# Patient Record
Sex: FEMALE | Race: BLACK OR AFRICAN AMERICAN | NOT HISPANIC OR LATINO | Employment: FULL TIME | URBAN - METROPOLITAN AREA
[De-identification: names, ages, dates, MRNs, and addresses within clinical notes are randomized per-mention and may not be internally consistent; named-entity substitution may affect disease eponyms.]

---

## 2017-02-24 ENCOUNTER — TELEPHONE (OUTPATIENT)
Dept: FAMILY MEDICINE | Facility: CLINIC | Age: 57
End: 2017-02-24

## 2017-02-24 NOTE — TELEPHONE ENCOUNTER
Patient is due for a mammogram. Left message for patient to call back  Diamante Parekh Scheduling at [phone]. Encounter sent to reception team to send letter to home address.     If patient returns call, please help them schedule a mammogram.     Thank you,    [facility]

## 2017-02-24 NOTE — LETTER
March 7, 2017      Nancy Bejarano  [address] [place] [address]    Dear Nancy Bejarano,     At [facility]  we care about your health and are committed to providing quality patient care, which includes staying current on preventative cancer screenings.  You can increase your chances of finding and treating cancers through regular screenings.      Our records show that you are due for the following screening:      Mammogram for breast cancer   Recommended every 1-2 years for women age 50 and older  Mammograms help detect breast cancer, which is the most common cancer among women in the [place].  You may need to start having mammograms earlier and more often if you have had breast cancer, breast problems, or a family history of breast cancer.       You may contact us by phone at [facility] at [phone] to schedule your mammogram at your earliest convenience.    If you have already had a mammogram at another facility, please call us so that we may update your chart.      Your partners in health,      Quality Committee   [facility]

## 2017-05-16 ENCOUNTER — TELEPHONE (OUTPATIENT)
Dept: FAMILY MEDICINE | Facility: CLINIC | Age: 57
End: 2017-05-16

## 2017-05-16 NOTE — TELEPHONE ENCOUNTER
Call Regarding Preventive Health Screening Mammogram    Attempt 1    Message on voicemail     Comments:       Outreach   Nancy Irizarry

## 2017-06-05 ENCOUNTER — OFFICE VISIT (OUTPATIENT)
Dept: FAMILY MEDICINE | Facility: CLINIC | Age: 57
End: 2017-06-05
Payer: COMMERCIAL

## 2017-06-05 VITALS
HEIGHT: 63 IN | TEMPERATURE: 98.6 F | WEIGHT: 162 LBS | HEART RATE: 92 BPM | DIASTOLIC BLOOD PRESSURE: 63 MMHG | SYSTOLIC BLOOD PRESSURE: 106 MMHG | BODY MASS INDEX: 28.7 KG/M2 | OXYGEN SATURATION: 98 %

## 2017-06-05 DIAGNOSIS — I10 HYPERTENSION GOAL BP (BLOOD PRESSURE) < 140/90: Primary | ICD-10-CM

## 2017-06-05 DIAGNOSIS — Z13.5 SCREENING FOR DIABETIC RETINOPATHY: ICD-10-CM

## 2017-06-05 DIAGNOSIS — R25.2 CRAMP OF LIMB: ICD-10-CM

## 2017-06-05 DIAGNOSIS — E78.5 HYPERLIPIDEMIA LDL GOAL <130: ICD-10-CM

## 2017-06-05 DIAGNOSIS — M79.672 PAIN IN BOTH FEET: ICD-10-CM

## 2017-06-05 DIAGNOSIS — Z12.11 SCREEN FOR COLON CANCER: ICD-10-CM

## 2017-06-05 DIAGNOSIS — Z12.31 VISIT FOR SCREENING MAMMOGRAM: ICD-10-CM

## 2017-06-05 DIAGNOSIS — M79.671 PAIN IN BOTH FEET: ICD-10-CM

## 2017-06-05 LAB
ALBUMIN SERPL-MCNC: 4.2 G/DL (ref 3.4–5)
ANION GAP SERPL CALCULATED.3IONS-SCNC: 7 MMOL/L (ref 3–14)
BUN SERPL-MCNC: 13 MG/DL (ref 7–30)
CALCIUM SERPL-MCNC: 9.4 MG/DL (ref 8.5–10.1)
CHLORIDE SERPL-SCNC: 102 MMOL/L (ref 94–109)
CHOLEST SERPL-MCNC: 215 MG/DL
CO2 SERPL-SCNC: 29 MMOL/L (ref 20–32)
CREAT SERPL-MCNC: 0.85 MG/DL (ref 0.52–1.04)
CREAT UR-MCNC: 61 MG/DL
GFR SERPL CREATININE-BSD FRML MDRD: 69 ML/MIN/1.7M2
GLUCOSE SERPL-MCNC: 95 MG/DL (ref 70–99)
HDLC SERPL-MCNC: 56 MG/DL
LDLC SERPL CALC-MCNC: 138 MG/DL
MICROALBUMIN UR-MCNC: <5 MG/L
MICROALBUMIN/CREAT UR: NORMAL MG/G CR (ref 0–25)
NONHDLC SERPL-MCNC: 159 MG/DL
PHOSPHATE SERPL-MCNC: 3.7 MG/DL (ref 2.5–4.5)
POTASSIUM SERPL-SCNC: 4.1 MMOL/L (ref 3.4–5.3)
SODIUM SERPL-SCNC: 138 MMOL/L (ref 133–144)
TRIGL SERPL-MCNC: 103 MG/DL

## 2017-06-05 PROCEDURE — 80061 LIPID PANEL: CPT | Performed by: FAMILY MEDICINE

## 2017-06-05 PROCEDURE — 82043 UR ALBUMIN QUANTITATIVE: CPT | Performed by: FAMILY MEDICINE

## 2017-06-05 PROCEDURE — 80069 RENAL FUNCTION PANEL: CPT | Performed by: FAMILY MEDICINE

## 2017-06-05 PROCEDURE — 36415 COLL VENOUS BLD VENIPUNCTURE: CPT | Performed by: FAMILY MEDICINE

## 2017-06-05 PROCEDURE — 99214 OFFICE O/P EST MOD 30 MIN: CPT | Performed by: FAMILY MEDICINE

## 2017-06-05 RX ORDER — CYCLOBENZAPRINE HCL 10 MG
5-10 TABLET ORAL 2 TIMES DAILY PRN
Qty: 30 TABLET | Refills: 1 | Status: SHIPPED | OUTPATIENT
Start: 2017-06-05 | End: 2017-07-05

## 2017-06-05 ASSESSMENT — PAIN SCALES - GENERAL: PAINLEVEL: WORST PAIN (10)

## 2017-06-05 NOTE — NURSING NOTE
"Chief Complaint   Patient presents with     Pain       Initial /63 (BP Location: Right arm, Patient Position: Chair, Cuff Size: Adult Large)  Pulse 92  Temp 98.6  F (37  C) (Oral)  Ht 5' 3.03\" (1.601 m)  Wt 162 lb (73.5 kg)  SpO2 98%  Breastfeeding? No  BMI 28.67 kg/m2 Estimated body mass index is 28.67 kg/(m^2) as calculated from the following:    Height as of this encounter: 5' 3.03\" (1.601 m).    Weight as of this encounter: 162 lb (73.5 kg).  Medication Reconciliation: complete     Gil Layton CMA     "

## 2017-06-05 NOTE — PROGRESS NOTES
SUBJECTIVE:                                                    Nancy Bejarano is a 57 year old female who presents to clinic today for the following health issues:    Joint Pain- muscle cramps in calves and feet that cause tension.      Onset: few months    Description:   Location: Left shoulder, fingers both hands, bottom on toes both feet  Character: Dull ache    Intensity: moderate, 10/10    Progression of Symptoms: same, intermittent    Accompanying Signs & Symptoms:  Other symptoms: numbness fingers and toes   History:   Previous similar pain: no       Precipitating factors:   Trauma or overuse: no, but stretching or it sometimes it just happens in a painful way all over or in the back, it's hard to describe. It can come at anytime. It feels like some kind of heavy thing in my toes or my back.     Alleviating factors:  Improved by: Cold water bottle rub, cold/wam towel, massaging       Therapies Tried and outcome: Cold water bottle rub, cold/wem towel, massaging      Hyperlipidemia Follow-Up      Rate your low fat/cholesterol diet?: good    Taking statin?  No    Other lipid medications/supplements?:  none     Hypertension Follow-up      Outpatient blood pressures are not being checked.    Low Salt Diet: no added salt       Problem list and histories reviewed & adjusted, as indicated.  Additional history: as documented    Patient Active Problem List   Diagnosis     Hypertension goal BP (blood pressure) < 140/90     Hyperlipidemia LDL goal <130     Back pain     GERD (gastroesophageal reflux disease)     Abdominal pain, unspecified abdominal location     Cataracts, both eyes     Posterior vitreous detachment of both eyes     Cervicalgia     Chronic pain of left knee     Left knee pain, unspecified chronicity     History reviewed. No pertinent surgical history.    Social History   Substance Use Topics     Smoking status: Never Smoker     Smokeless tobacco: Never Used      Comment: lives in smoke free household.      Alcohol use No     Family History   Problem Relation Age of Onset     DIABETES No family hx of      Hypertension No family hx of      Breast Cancer No family hx of      Cancer - colorectal No family hx of      CANCER No family hx of      CEREBROVASCULAR DISEASE No family hx of      Thyroid Disease No family hx of      Glaucoma No family hx of      Macular Degeneration No family hx of          Current Outpatient Prescriptions   Medication Sig Dispense Refill     atenolol (TENORMIN) 50 MG tablet Take 1 tablet (50 mg) by mouth daily 90 tablet 3     order for DME Equipment being ordered: [id number] $144, . Knee, Hinged, Lg blk neoprene 1 Device 0     Allergies   Allergen Reactions     Asa [Dihydroxyaluminum Aminoacetate] Other (See Comments)     Epigastric   pain     Recent Labs   Lab Test  06/13/16   1204  08/12/15   1054  05/19/14   1302  12/04/13   1239  09/23/13   1401   12/27/11   0825  08/12/11   1117   LDL   --    --    --    --    --    --   96  143*   HDL   --    --    --    --    --    --   46*  44*   TRIG   --    --    --    --    --    --   39  63   ALT  28   --   20  33   --    < >  31   --    CR  0.85  0.94  0.98  0.89   --    < >  0.96   --    GFRESTIMATED  69  62  59*  66   --    < >  61   --    GFRESTBLACK  83  75  72  80   --    < >  74   --    POTASSIUM  4.1  4.1  3.9  4.1   --    < >  4.0   --    TSH  2.58   --    --    --   4.13   < >   --    --     < > = values in this interval not displayed.      BP Readings from Last 3 Encounters:   06/05/17 106/63   12/30/16 151/82   12/30/16 151/82    Wt Readings from Last 3 Encounters:   06/05/17 162 lb (73.5 kg)   12/30/16 165 lb 12.8 oz (75.2 kg)   12/30/16 165 lb 12.8 oz (75.2 kg)                  Labs reviewed in EPIC    Reviewed and updated as needed this visit by clinical staff       Reviewed and updated as needed this visit by Provider         ROS:  Constitutional, HEENT, cardiovascular, pulmonary, gi and gu systems are negative, except as  "otherwise noted.    OBJECTIVE:                                                    /63 (BP Location: Right arm, Patient Position: Chair, Cuff Size: Adult Large)  Pulse 92  Temp 98.6  F (37  C) (Oral)  Ht 5' 3.03\" (1.601 m)  Wt 162 lb (73.5 kg)  SpO2 98%  Breastfeeding? No  BMI 28.67 kg/m2  Body mass index is 28.67 kg/(m^2).  GENERAL APPEARANCE: healthy, alert and no distress  EYES: Eyes grossly normal to inspection, PERRL and conjunctivae and sclerae normal  HENT: ear canals and TM's normal, nose and mouth without ulcers or lesions, oropharynx clear and oral mucous membranes moist  NECK: no adenopathy, no asymmetry, masses, or scars and thyroid normal to palpation  RESP: lungs clear to auscultation - no rales, rhonchi or wheezes  CV: regular rates and rhythm, normal S1 S2, no S3 or S4, no murmur, click or rub, no peripheral edema and peripheral pulses strong  ABDOMEN: soft, nontender, no hepatosplenomegaly, no masses and bowel sounds normal  MS: no musculoskeletal defects are noted and gait is age appropriate without ataxia, 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 Ulnar deviation.   SKIN: no suspicious lesions or rashes  NEURO: Normal strength and tone, sensory exam grossly normal, mentation intact and speech normal  PSYCH: mentation appears normal and affect normal/bright   Diabetic foot exam: normal DP and PT pulses, no trophic changes or ulcerative lesions, normal calluses, no deformities, normal sensory exam and normal monofilament exam, both little toes invert and are stepped on by fourth toes. Feet are somewhat flat. Nails dystrophic.     Diagnostic Test Results:  Results for orders placed or performed in visit on 06/05/17 (from the past 24 hour(s))   Lipid panel reflex to direct LDL   Result Value Ref Range    Cholesterol 215 (H) <200 mg/dL    Triglycerides 103 <150 mg/dL    HDL Cholesterol 56 >49 mg/dL    LDL Cholesterol Calculated 138 (H) <100 mg/dL " "   Non HDL Cholesterol 159 (H) <130 mg/dL   Renal panel   Result Value Ref Range    Sodium 138 133 - 144 mmol/L    Potassium 4.1 3.4 - 5.3 mmol/L    Chloride 102 94 - 109 mmol/L    Carbon Dioxide 29 20 - 32 mmol/L    Anion Gap 7 3 - 14 mmol/L    Glucose 95 70 - 99 mg/dL    Urea Nitrogen 13 7 - 30 mg/dL    Creatinine 0.85 0.52 - 1.04 mg/dL    GFR Estimate 69 >60 mL/min/1.7m2    GFR Estimate If Black 83 >60 mL/min/1.7m2    Calcium 9.4 8.5 - 10.1 mg/dL    Phosphorus 3.7 2.5 - 4.5 mg/dL    Albumin 4.2 3.4 - 5.0 g/dL        ASSESSMENT/PLAN:                                                        Tobacco Cessation:   reports that she has never smoked. She has never used smokeless tobacco.      BMI:   Estimated body mass index is 28.67 kg/(m^2) as calculated from the following:    Height as of this encounter: 5' 3.03\" (1.601 m).    Weight as of this encounter: 162 lb (73.5 kg).   Weight management plan: Discussed healthy diet and exercise guidelines and patient will follow up in 3 months in clinic to re-evaluate.        ICD-10-CM    1. Hypertension goal BP (blood pressure) < 140/90- low blood pressure in clinic and at home. Does not need medication changes at this time.  I10 Renal panel     Albumin Random Urine Quantitative   2. Hyperlipidemia LDL goal <130 E78.5 Lipid panel reflex to direct LDL   3. Screen for colon cancer Z12.11 Fecal colorectal cancer screen FIT - Future (S+30)   4. Visit for screening mammogram Z12.31 MA SCREENING DIGITAL BILAT - Future  (s+30)   5. Screening for diabetic retinopathy Z13.5    6. Pain in both feet M79.671 PODIATRY/FOOT & ANKLE SURGERY REFERRAL    M79.672    7. Cramp of limb- does not sound like restless legs R25.2 cyclobenzaprine (FLEXERIL) 10 MG tablet       CONSULTATION/REFERRAL to podiatry for foot evaluation. She is not a good historian and tends to jump from one body part and symptom to another but some of her symptoms focus on her foot issues.  FUTURE LABS:       - Schedule fasting " labs in 6 months  FUTURE APPOINTMENTS:       - Follow-up visit in 3 months or sooner if any questions or concerns.   Work on weight loss  Regular exercise  See Patient Instructions    Amelia Souza MD  [facility]

## 2017-06-05 NOTE — PATIENT INSTRUCTIONS
How to contact your care team: [phone] Pharmacy [phone]   EDITA DIOR MD KATYA GEORGIEV, PA-C CHRIS JONES, PA-C NAM HO, MD JONATHAN BATES, MD ARVIN VOCAL, MD    Clinic hours M-Th 7am-7pm Fri 7am-5pm.   Urgent care M-F 11am-9pm  Sat/Sun 9am-5pm.   Pharmacy   Mon-Th:  8:00am-8pm   Fri:  8:00am-6:00pm  Sat/Sun  8:00am-5:00 pm       Understanding Restless Legs Syndrome  Are you ever annoyed by a creeping or itching feeling in your legs? Do you often feel an urge to move your legs while sitting or lying in bed? This can keep you from falling asleep at night. You may then feel tired during the day. If you have these problems, talk to your health care provider. He or she can suggest a treatment plan and help you find ways to sleep better.    Restless legs syndrome (RLS)  RLS is a creeping, crawly, or jumpy feeling in the legs with an urge to move them. Symptoms of RLS often occur during periods of inactivity, such as when you sit or lie down at night. This discomfort can keep you from falling asleep. RLS is more common in older people and tends to run in families. Overuse of caffeine or alcohol may make symptoms worse. Iron deficiency, diabetes, or kidney problems can contribute to RLS.  Periodic limb movement syndrome (PLMS)  PLMS is sudden, repetitive leg jerking during sleep. The person you sleep with is often the one who notices it. Your legs may jerk many times during the night. You and your partner may both have trouble sleeping and feel tired in the morning. PLMS shouldn t be confused with the normal leg or body twitching many people have when first falling asleep.  Treating these problems  If these problems are causing disrupted sleep and daytime symptoms, treatment may be needed. Possible treatments may include:    Avoiding medications like antidepressants, antinausea medications, and antipsychotic medications.     Prescribed medications.    Lifestyle changes, such as  controlling caffeine intake, alcohol, and smoking.    7151-5298 [employer]. [address] [place] [address], [place] [address]. All rights reserved. This information is not intended as a substitute for professional medical care. Always follow your healthcare professional's instructions.        Restless Legs Syndrome: What You Can Do  Symptoms of restless leg syndrome (RLS) can be treated. Together, you and your health care provider can work on your treatment plan. If needed, medications may be prescribed. Also learn what you can do to ease your discomfort. Good sleep habits and a healthy lifestyle will help you rest better at night and have more energy during the day.    Working with your health care provider  RLS may occur on its own and may be passed on in families. It is sometimes linked to other medical problems. Low iron may cause some RLS symptoms. Your health care provider may order a lab test to check your iron level. Other medical problems associated with RLS are kidney disease, diabetes, and multiple sclerosis. Your doctor may prescribe medications to reduce your symptoms and help you sleep better.  Tips for temporary relief  To reduce your discomfort, try the following:    Walking or stretching    Rubbing your legs    Having a massage    Taking a hot or cold bath    Doing activities that make muscles in your hands or legs work    Relaxing with yoga or meditation  Good sleep habits  Even though you have RLS, you can still have restful sleep. Try these good sleeping habits:    Keep a regular sleep schedule. Go to bed and get up at the same time each day.    Avoid or limit naps.    Make sure the bedroom is quiet, dark, and not too hot or too cold.    Use your bed only for sleep and sex.  Healthy lifestyle  Your lifestyle affects your health and your sleep. Here are some healthy habits:    Eat a balanced diet. To get enough vitamins and minerals, you may also need to take supplements.    Manage stress  and learn ways to relax. Deep breathing techniques and visualization can help to relax your muscles and calm your mind.    Exercise regularly. It can help reduce stress. Also, you will have more energy during the day and be more tired at bedtime. Afternoon exercise is best. Nighttime exercise may affect how well you sleep.    Avoid alcohol, nicotine, and caffeine.    7289-2006 [employer]. [address] [place] [address], [place] [address]. All rights reserved. This information is not intended as a substitute for professional medical care. Always follow your healthcare professional's instructions.

## 2017-06-05 NOTE — LETTER
[facility]   [address]  [facility] [address]      June 6, 2017      Nancy KEMAR Bejarano  [address] [facility] [place] [address]            Dear Nancy Bejarano,    Your test results are attached. I am happy to let you know that they are stable and your medications can stay the same.    The cholesterol looks good for your age and health. The blood sugar is normal and you do not have diabetes. The kidneys are healthy. We can recheck labs in 1 year.     Please call me if you have any questions about these test results or about your care.    Sincerely,    Amelia Souza MD/ak        Results for orders placed or performed in visit on 06/05/17   Lipid panel reflex to direct LDL   Result Value Ref Range    Cholesterol 215 (H) <200 mg/dL    Triglycerides 103 <150 mg/dL    HDL Cholesterol 56 >49 mg/dL    LDL Cholesterol Calculated 138 (H) <100 mg/dL    Non HDL Cholesterol 159 (H) <130 mg/dL   Renal panel   Result Value Ref Range    Sodium 138 133 - 144 mmol/L    Potassium 4.1 3.4 - 5.3 mmol/L    Chloride 102 94 - 109 mmol/L    Carbon Dioxide 29 20 - 32 mmol/L    Anion Gap 7 3 - 14 mmol/L    Glucose 95 70 - 99 mg/dL    Urea Nitrogen 13 7 - 30 mg/dL    Creatinine 0.85 0.52 - 1.04 mg/dL    GFR Estimate 69 >60 mL/min/1.7m2    GFR Estimate If Black 83 >60 mL/min/1.7m2    Calcium 9.4 8.5 - 10.1 mg/dL    Phosphorus 3.7 2.5 - 4.5 mg/dL    Albumin 4.2 3.4 - 5.0 g/dL   Albumin Random Urine Quantitative   Result Value Ref Range    Creatinine Urine 61 mg/dL    Albumin Urine mg/L <5 mg/L    Albumin Urine mg/g Cr Unable to calculate due to low value 0 - 25 mg/g Cr

## 2017-06-05 NOTE — MR AVS SNAPSHOT
After Visit Summary   6/5/2017    Nancy Bejarano    MRN: [id number]           Patient Information     Date Of Birth          1960        Visit Information        Provider Department      6/5/2017 2:00 PM Amelia Souza MD [facility]        Today's Diagnoses     Hypertension goal BP (blood pressure) < 140/90    -  1    Hyperlipidemia LDL goal <130        Screen for colon cancer        Visit for screening mammogram        Screening for diabetic retinopathy        Pain in both feet        Cramp of limb          Care Instructions    How to contact your care team: [phone] Pharmacy [phone]   EDITA DIOR MD KATYA GEORGIEV, PA-C CHRIS JONES, PA-C NAM HO, MD JONATHAN BATES, MD ARVIN VOCAL, MD    Clinic hours M-Th 7am-7pm Fri 7am-5pm.   Urgent care M-F 11am-9pm  Sat/Sun 9am-5pm.   Pharmacy   Mon-Th:  8:00am-8pm   Fri:  8:00am-6:00pm  Sat/Sun  8:00am-5:00 pm       Understanding Restless Legs Syndrome  Are you ever annoyed by a creeping or itching feeling in your legs? Do you often feel an urge to move your legs while sitting or lying in bed? This can keep you from falling asleep at night. You may then feel tired during the day. If you have these problems, talk to your health care provider. He or she can suggest a treatment plan and help you find ways to sleep better.    Restless legs syndrome (RLS)  RLS is a creeping, crawly, or jumpy feeling in the legs with an urge to move them. Symptoms of RLS often occur during periods of inactivity, such as when you sit or lie down at night. This discomfort can keep you from falling asleep. RLS is more common in older people and tends to run in families. Overuse of caffeine or alcohol may make symptoms worse. Iron deficiency, diabetes, or kidney problems can contribute to RLS.  Periodic limb movement syndrome (PLMS)  PLMS is sudden, repetitive leg jerking during sleep. The person you sleep with is often  the one who notices it. Your legs may jerk many times during the night. You and your partner may both have trouble sleeping and feel tired in the morning. PLMS shouldn t be confused with the normal leg or body twitching many people have when first falling asleep.  Treating these problems  If these problems are causing disrupted sleep and daytime symptoms, treatment may be needed. Possible treatments may include:    Avoiding medications like antidepressants, antinausea medications, and antipsychotic medications.     Prescribed medications.    Lifestyle changes, such as controlling caffeine intake, alcohol, and smoking.    9582-6904 [employer]. [address] [place] [address]. All rights reserved. This information is not intended as a substitute for professional medical care. Always follow your healthcare professional's instructions.        Restless Legs Syndrome: What You Can Do  Symptoms of restless leg syndrome (RLS) can be treated. Together, you and your health care provider can work on your treatment plan. If needed, medications may be prescribed. Also learn what you can do to ease your discomfort. Good sleep habits and a healthy lifestyle will help you rest better at night and have more energy during the day.    Working with your health care provider  RLS may occur on its own and may be passed on in families. It is sometimes linked to other medical problems. Low iron may cause some RLS symptoms. Your health care provider may order a lab test to check your iron level. Other medical problems associated with RLS are kidney disease, diabetes, and multiple sclerosis. Your doctor may prescribe medications to reduce your symptoms and help you sleep better.  Tips for temporary relief  To reduce your discomfort, try the following:    Walking or stretching    Rubbing your legs    Having a massage    Taking a hot or cold bath    Doing activities that make muscles in your hands or legs  work    Relaxing with yoga or meditation  Good sleep habits  Even though you have RLS, you can still have restful sleep. Try these good sleeping habits:    Keep a regular sleep schedule. Go to bed and get up at the same time each day.    Avoid or limit naps.    Make sure the bedroom is quiet, dark, and not too hot or too cold.    Use your bed only for sleep and sex.  Healthy lifestyle  Your lifestyle affects your health and your sleep. Here are some healthy habits:    Eat a balanced diet. To get enough vitamins and minerals, you may also need to take supplements.    Manage stress and learn ways to relax. Deep breathing techniques and visualization can help to relax your muscles and calm your mind.    Exercise regularly. It can help reduce stress. Also, you will have more energy during the day and be more tired at bedtime. Afternoon exercise is best. Nighttime exercise may affect how well you sleep.    Avoid alcohol, nicotine, and caffeine.    3849-7835 [employer]. [address] [place] [address]. All rights reserved. This information is not intended as a substitute for professional medical care. Always follow your healthcare professional's instructions.                Follow-ups after your visit        Additional Services     PODIATRY/FOOT & ANKLE SURGERY REFERRAL       Your provider has referred you to: [facility]: [facility] [phone]   [email or web address]    Please be aware that coverage of these services is subject to the terms and limitations of your health insurance plan.  Call member services at your health plan with any benefit or coverage questions.      Please bring the following to your appointment:  >>   Any x-rays, CTs or MRIs which have been performed.  Contact the facility where they were done to arrange for  prior to your scheduled appointment.    >>   List of current medications   >>   This referral request  "  >>   Any documents/labs given to you for this referral                  Future tests that were ordered for you today     Open Future Orders        Priority Expected Expires Ordered    MA SCREENING DIGITAL BILAT - Future  (s+30) Routine  2018    Fecal colorectal cancer screen FIT - Future (S+30) Routine 2017            Who to contact     If you have questions or need follow up information about today's clinic visit or your schedule please contact [facility]NAD[facility] directly at [phone].  Normal or non-critical lab and imaging results will be communicated to you by [employer], letter or phone within 4 business days after the clinic has received the results. If you do not hear from us within 7 days, please contact the clinic through 1DayLater[employer] or phone. If you have a critical or abnormal lab result, we will notify you by phone as soon as possible.  Submit refill requests through [employer] or call your pharmacy and they will forward the refill request to us. Please allow 3 business days for your refill to be completed.          Additional Information About Your Visit        [employer] Information     [employer] lets you send messages to your doctor, view your test results, renew your prescriptions, schedule appointments and more. To sign up, go to [email or web address] . Click on \"Log in\" on the left side of the screen, which will take you to the Welcome page. Then click on \"Sign up Now\" on the right side of the page.     You will be asked to enter the access code listed below, as well as some personal information. Please follow the directions to create your username and password.     Your access code is: [email or web address]  Expires: 2017 12:34 PM     Your access code will  in 90 days. If you need help or a new code, please call your [facility] or [phone].        Care EveryWhere ID     This is your Care EveryWhere ID. This could be used by other organizations to " "access your [place] medical records  [id number]        Your Vitals Were     Pulse Temperature Height Pulse Oximetry Breastfeeding? BMI (Body Mass Index)    92 98.6  F (37  C) (Oral) 5' 3.03\" (1.601 m) 98% No 28.67 kg/m2       Blood Pressure from Last 3 Encounters:   06/05/17 106/63   12/30/16 151/82   12/30/16 151/82    Weight from Last 3 Encounters:   06/05/17 162 lb (73.5 kg)   12/30/16 165 lb 12.8 oz (75.2 kg)   12/30/16 165 lb 12.8 oz (75.2 kg)              We Performed the Following     Albumin Random Urine Quantitative     Lipid panel reflex to direct LDL     PODIATRY/FOOT & ANKLE SURGERY REFERRAL     Renal panel          Today's Medication Changes          These changes are accurate as of: 6/5/17  3:06 PM.  If you have any questions, ask your nurse or doctor.               Start taking these medicines.        Dose/Directions    cyclobenzaprine 10 MG tablet   Commonly known as:  FLEXERIL   Used for:  Cramp of limb   Started by:  Amelia Souza MD        Dose:  5-10 mg   Take 0.5-1 tablets (5-10 mg) by mouth 2 times daily as needed for muscle spasms   Quantity:  30 tablet   Refills:  1            Where to get your medicines      These medications were sent to [facility]/pharmacy #[id number] - [facility], [place] - [address]  [address] [facility] [address]     Phone:  [phone]     cyclobenzaprine 10 MG tablet                Primary Care Provider Office Phone # Fax #    SHANE Orantes -[phone]       [facility] [address]  [facility] [address]        Thank you!     Thank you for choosing [facility]  for your care. Our goal is always to provide you with excellent care. Hearing back from our patients is one way we can continue to improve our services. Please take a few minutes to complete the written survey that you may receive in the mail after your visit with us. Thank you!             Your Updated Medication List - Protect others " around you: Learn how to safely use, store and throw away your medicines at www.disposemymeds.org.          This list is accurate as of: 6/5/17  3:06 PM.  Always use your most recent med list.                   Brand Name Dispense Instructions for use    atenolol 50 MG tablet    TENORMIN    90 tablet    Take 1 tablet (50 mg) by mouth daily       cyclobenzaprine 10 MG tablet    FLEXERIL    30 tablet    Take 0.5-1 tablets (5-10 mg) by mouth 2 times daily as needed for muscle spasms       order for DME     1 Device    Equipment being ordered: [id number] $144, . Knee, Hinged, Lg blk neoprene

## 2017-06-06 DIAGNOSIS — Z12.11 SCREEN FOR COLON CANCER: ICD-10-CM

## 2017-06-06 PROCEDURE — 82274 ASSAY TEST FOR BLOOD FECAL: CPT | Performed by: FAMILY MEDICINE

## 2017-06-06 ASSESSMENT — PATIENT HEALTH QUESTIONNAIRE - PHQ9: SUM OF ALL RESPONSES TO PHQ QUESTIONS 1-9: 10

## 2017-06-06 NOTE — LETTER
[facility]       [address]  [place] [address]      June 8, 2017      Nancy Bejarano  [address] [facility] [place] [address]              Dear Nancy,    There was no blood in the stool. Recheck in 1 year.    Please call me if you have any questions about your condition or care.     Sincerely,    Amelia Souza MD/sola    Results for orders placed or performed in visit on 06/06/17   Fecal colorectal cancer screen FIT - Future (S+30)   Result Value Ref [place]    Occult Blood Scn FIT Negative NEG   MRN: [id number]

## 2017-06-06 NOTE — PROGRESS NOTES
Dear Nancy Bejarano,    Your test results are attached. I am happy to let you know that they are stable and your medications can stay the same.    The cholesterol looks good for your age and health. The blood sugar is normal and you do not have diabetes. The kidneys are healthy. We can recheck labs in 1 year.     Please call me if you have any questions about these test results or about your care.    Sincerely,    Amelia Souza MD

## 2017-06-07 LAB — HEMOCCULT STL QL IA: NEGATIVE

## 2017-06-07 NOTE — PROGRESS NOTES
Dear Nancy Bejarano,    The lab results from the most recent visit are all normal or in a good range.     There was no blood in the stool. Recheck in 1 year.    Please call me if you have any questions about your condition or care.     Sincerely,    Amelia Souza MD

## 2017-06-13 ENCOUNTER — OFFICE VISIT (OUTPATIENT)
Dept: FAMILY MEDICINE | Facility: CLINIC | Age: 57
End: 2017-06-13
Payer: OTHER MISCELLANEOUS

## 2017-06-13 VITALS
OXYGEN SATURATION: 99 % | BODY MASS INDEX: 29.06 KG/M2 | SYSTOLIC BLOOD PRESSURE: 130 MMHG | DIASTOLIC BLOOD PRESSURE: 72 MMHG | WEIGHT: 164 LBS | HEART RATE: 98 BPM | TEMPERATURE: 97.2 F | HEIGHT: 63 IN

## 2017-06-13 DIAGNOSIS — M25.562 LEFT KNEE PAIN, UNSPECIFIED CHRONICITY: Primary | ICD-10-CM

## 2017-06-13 DIAGNOSIS — M25.561 RIGHT KNEE PAIN, UNSPECIFIED CHRONICITY: ICD-10-CM

## 2017-06-13 PROCEDURE — 99213 OFFICE O/P EST LOW 20 MIN: CPT | Performed by: PHYSICIAN ASSISTANT

## 2017-06-13 RX ORDER — METHOCARBAMOL 750 MG/1
750 TABLET, FILM COATED ORAL 3 TIMES DAILY PRN
Qty: 60 TABLET | Refills: 0 | Status: SHIPPED | OUTPATIENT
Start: 2017-06-13 | End: 2017-07-05

## 2017-06-13 RX ORDER — NAPROXEN 500 MG/1
500 TABLET ORAL 2 TIMES DAILY PRN
Qty: 60 TABLET | Refills: 1 | Status: SHIPPED | OUTPATIENT
Start: 2017-06-13 | End: 2017-07-05

## 2017-06-13 ASSESSMENT — ANXIETY QUESTIONNAIRES
GAD7 TOTAL SCORE: 0
1. FEELING NERVOUS, ANXIOUS, OR ON EDGE: NOT AT ALL
2. NOT BEING ABLE TO STOP OR CONTROL WORRYING: NOT AT ALL
5. BEING SO RESTLESS THAT IT IS HARD TO SIT STILL: NOT AT ALL
IF YOU CHECKED OFF ANY PROBLEMS ON THIS QUESTIONNAIRE, HOW DIFFICULT HAVE THESE PROBLEMS MADE IT FOR YOU TO DO YOUR WORK, TAKE CARE OF THINGS AT HOME, OR GET ALONG WITH OTHER PEOPLE: NOT DIFFICULT AT ALL
7. FEELING AFRAID AS IF SOMETHING AWFUL MIGHT HAPPEN: NOT AT ALL
6. BECOMING EASILY ANNOYED OR IRRITABLE: NOT AT ALL
3. WORRYING TOO MUCH ABOUT DIFFERENT THINGS: NOT AT ALL

## 2017-06-13 ASSESSMENT — PATIENT HEALTH QUESTIONNAIRE - PHQ9: 5. POOR APPETITE OR OVEREATING: NOT AT ALL

## 2017-06-13 NOTE — MR AVS SNAPSHOT
"              After Visit Summary   6/13/2017    Nancy Bejarano    MRN: [id number]           Patient Information     Date Of Birth          1960        Visit Information        Provider Department      6/13/2017 8:20 AM Brie Calvillo PA-C [facility]        Today's Diagnoses     Left knee pain, unspecified chronicity    -  1    Visit for screening mammogram        Screening for diabetic retinopathy        Right knee pain, unspecified chronicity          Care Instructions    Robaxin 750 mg three times a day as needed for tightness in the knees and calves  Naproxen 500 mg twice a day with food   Start physical therapy  Follow up in 4-6 weeks            Follow-ups after your visit        Additional Services     PHYSICAL THERAPY REFERRAL       *This therapy referral will be filtered to a centralized scheduling office at [facility] and the patient will receive a call to schedule an appointment at a [facility] location most convenient for them. *     [facility] provides Physical Therapy evaluation and treatment and many specialty services across the [facility] system.  If requesting a specialty program, please choose from the list below.    If you have not heard from the scheduling office within 2 business days, please call [phone] for all locations, with the exception of [place], please call [phone].  Treatment: Evaluation & Treatment  Special Instructions/Modalities: improve mobility and flexibility. Work comp case.   Special Programs: None    Please be aware that coverage of these services is subject to the terms and limitations of your health insurance plan.  Call member services at your health plan with any benefit or coverage questions.      **Note to Provider:  If you are referring outside of [facility] for the therapy appointment, please list the name of the location in the \"special instructions\" above, print the referral and give " "to the patient to schedule the appointment.                  Your next 10 appointments already scheduled     2017  9:00 AM CDT   New Visit with Rolo Epps DPM   [facility]    [address] [place] [id number] [phone]              Who to contact     If you have questions or need follow up information about today's clinic visit or your schedule please contact [facility] directly at [phone].  Normal or non-critical lab and imaging results will be communicated to you by [employer], letter or phone within 4 business days after the clinic has received the results. If you do not hear from us within 7 days, please contact the clinic through [employer] or phone. If you have a critical or abnormal lab result, we will notify you by phone as soon as possible.  Submit refill requests through [employer] or call your pharmacy and they will forward the refill request to us. Please allow 3 business days for your refill to be completed.          Additional Information About Your Visit        [employer] Information     [employer] lets you send messages to your doctor, view your test results, renew your prescriptions, schedule appointments and more. To sign up, go to [email or web address] . Click on \"Log in\" on the left side of the screen, which will take you to the Welcome page. Then click on \"Sign up Now\" on the right side of the page.     You will be asked to enter the access code listed below, as well as some personal information. Please follow the directions to create your username and password.     Your access code is: [email or web address]  Expires: 2017 12:34 PM     Your access code will  in 90 days. If you need help or a new code, please call your [facility] or [phone].        Care EveryWhere ID     This is your Care EveryWhere ID. This could be used by other organizations to access your [facility] medical " "records  [id number]        Your Vitals Were     Pulse Temperature Height Pulse Oximetry Breastfeeding? BMI (Body Mass Index)    98 97.2  F (36.2  C) (Oral) 5' 3\" (1.6 m) 99% No 29.05 kg/m2       Blood Pressure from Last 3 Encounters:   06/13/17 130/72   06/05/17 106/63   12/30/16 151/82    Weight from Last 3 Encounters:   06/13/17 164 lb (74.4 kg)   06/05/17 162 lb (73.5 kg)   12/30/16 165 lb 12.8 oz (75.2 kg)              We Performed the Following     PHYSICAL THERAPY REFERRAL          Today's Medication Changes          These changes are accurate as of: 6/13/17  9:02 AM.  If you have any questions, ask your nurse or doctor.               Start taking these medicines.        Dose/Directions    methocarbamol 750 MG tablet   Commonly known as:  methocarbamol   Used for:  Left knee pain, unspecified chronicity   Started by:  Brie Calvillo PA-C        Dose:  750 mg   Take 1 tablet (750 mg) by mouth 3 times daily as needed for muscle spasms   Quantity:  60 tablet   Refills:  0       naproxen 500 MG tablet   Commonly known as:  NAPROSYN   Used for:  Right knee pain, unspecified chronicity, Left knee pain, unspecified chronicity   Started by:  Brie Calvillo PA-C        Dose:  500 mg   Take 1 tablet (500 mg) by mouth 2 times daily as needed for moderate pain   Quantity:  60 tablet   Refills:  1            Where to get your medicines      These medications were sent to [facility]/pharmacy #[id number] - [place], [place] - [address]  [address] [facility] [address]     Phone:  [phone]     methocarbamol 750 MG tablet    naproxen 500 MG tablet                Primary Care Provider Office Phone # Fax #    SHANE Orantes -[phone]       [facility]3] [address]  [facility] [address]        Thank you!     Thank you for choosing [facility]  for your care. Our goal is always to provide you with excellent care. Hearing back from " our patients is one way we can continue to improve our services. Please take a few minutes to complete the written survey that you may receive in the mail after your visit with us. Thank you!             Your Updated Medication List - Protect others around you: Learn how to safely use, store and throw away your medicines at www.disposemymeds.org.          This list is accurate as of: 6/13/17  9:02 AM.  Always use your most recent med list.                   Brand Name Dispense Instructions for use    atenolol 50 MG tablet    TENORMIN    90 tablet    Take 1 tablet (50 mg) by mouth daily       cyclobenzaprine 10 MG tablet    FLEXERIL    30 tablet    Take 0.5-1 tablets (5-10 mg) by mouth 2 times daily as needed for muscle spasms       methocarbamol 750 MG tablet    methocarbamol    60 tablet    Take 1 tablet (750 mg) by mouth 3 times daily as needed for muscle spasms       naproxen 500 MG tablet    NAPROSYN    60 tablet    Take 1 tablet (500 mg) by mouth 2 times daily as needed for moderate pain       order for DME     1 Device    Equipment being ordered: [id number] $144, . Knee, Hinged, Lg blk neoprene

## 2017-06-13 NOTE — LETTER
[address] [address]  [phone]    REPORT OF WORK ABILITY       NOTE TO EMPLOYEE: You must promptly provide a copy of this report to your  employer or worker's compensation insurer, and Qualified Rehabilitation Consultant.     Date: 6/13/17                     Employee Name: Nancy Bejarano         YOB: 1960  Medical Record Number: [id number]   Soc.Sec.No: [id number]  Employer: [employer]                 Date of Injury: 7/6/16  Managed Care Organization / Insurance Company Name: UNKNOWN     Diagnosis: left knee contusion, left knee pain, right knee pain   Work Related: yes    Permanent Partial Disability(PPD) likely: UNKNOWN        EMPLOYEE IS ABLE TO WORK: Return to work with restrictions on 6/13/17, but continue physical therapy      RESTRICTIONS IF ANY: None     TREATMENT PLAN/NOTES: continue PT and do home program. Robaxin and Ibuprofen as needed for pain.   Follow up in 1 month.         Shefali Calvillo PAC

## 2017-06-13 NOTE — NURSING NOTE
"Chief Complaint   Patient presents with     Musculoskeletal Problem     both knee pain       Initial /72 (BP Location: Left arm, Patient Position: Chair, Cuff Size: Adult Regular)  Pulse 98  Temp 97.2  F (36.2  C) (Oral)  Ht 5' 3\" (1.6 m)  Wt 164 lb (74.4 kg)  SpO2 99%  Breastfeeding? No  BMI 29.05 kg/m2 Estimated body mass index is 29.05 kg/(m^2) as calculated from the following:    Height as of this encounter: 5' 3\" (1.6 m).    Weight as of this encounter: 164 lb (74.4 kg).  Medication Reconciliation: complete     Satish Cordero MA      "

## 2017-06-13 NOTE — PROGRESS NOTES
SUBJECTIVE:                                                    Nancy Bejarano is a 57 year old female who presents to clinic today for the following health issues:      Joint Pain Work comp     Onset: ongoing    Description:   Location: both knee  Character: unexplainable    Intensity: severe    Progression of Symptoms: same    Accompanying Signs & Symptoms:  Other symptoms: none   History:   Previous similar pain: YES      Precipitating factors:   Trauma or overuse: YES- twisted knee while exercising    Alleviating factors:  Improved by: nothing       Therapies Tried and outcome: none    Left knee is old wk injury, but right knee pain started 1 month ago. Patient feels that her right knee pain is caused by left knee chronic pain from work comp injury.       Problem list and histories reviewed & adjusted, as indicated.  Additional history: as documented    Patient Active Problem List   Diagnosis     Hypertension goal BP (blood pressure) < 140/90     Hyperlipidemia LDL goal <130     Back pain     GERD (gastroesophageal reflux disease)     Abdominal pain, unspecified abdominal location     Cataracts, both eyes     Posterior vitreous detachment of both eyes     Cervicalgia     Chronic pain of left knee     Left knee pain, unspecified chronicity     History reviewed. No pertinent surgical history.    Social History   Substance Use Topics     Smoking status: Never Smoker     Smokeless tobacco: Never Used      Comment: lives in smoke free household.     Alcohol use No     Family History   Problem Relation Age of Onset     DIABETES No family hx of      Hypertension No family hx of      Breast Cancer No family hx of      Cancer - colorectal No family hx of      CANCER No family hx of      CEREBROVASCULAR DISEASE No family hx of      Thyroid Disease No family hx of      Glaucoma No family hx of      Macular Degeneration No family hx of          Current Outpatient Prescriptions   Medication Sig Dispense Refill      "methocarbamol (METHOCARBAMOL) 750 MG tablet Take 1 tablet (750 mg) by mouth 3 times daily as needed for muscle spasms 60 tablet 0     naproxen (NAPROSYN) 500 MG tablet Take 1 tablet (500 mg) by mouth 2 times daily as needed for moderate pain 60 tablet 1     cyclobenzaprine (FLEXERIL) 10 MG tablet Take 0.5-1 tablets (5-10 mg) by mouth 2 times daily as needed for muscle spasms 30 tablet 1     atenolol (TENORMIN) 50 MG tablet Take 1 tablet (50 mg) by mouth daily 90 tablet 3     order for DME Equipment being ordered: [id number] $144, . Knee, Hinged, Lg blk neoprene 1 Device 0     Allergies   Allergen Reactions     Asa [Dihydroxyaluminum Aminoacetate] Other (See Comments)     Epigastric   pain       Reviewed and updated as needed this visit by clinical staff  Tobacco  Allergies  Meds  Med Hx  Surg Hx  Fam Hx  Soc Hx      Reviewed and updated as needed this visit by Provider         ROS:  Constitutional, HEENT, cardiovascular, pulmonary, gi and gu systems are negative, except as otherwise noted.    OBJECTIVE:                                                    /72 (BP Location: Left arm, Patient Position: Chair, Cuff Size: Adult Regular)  Pulse 98  Temp 97.2  F (36.2  C) (Oral)  Ht 5' 3\" (1.6 m)  Wt 164 lb (74.4 kg)  SpO2 99%  Breastfeeding? No  BMI 29.05 kg/m2  Body mass index is 29.05 kg/(m^2).  GENERAL: healthy, alert and no distress  MS:   GAIT: antalgic  Dorsalis Pedis pulses intact bilaterally.  MUSCULOSKELETAL:  RIGHT KNEE   Inspection: AP/lateral alignment normal  Tender: medial patellar facet  Non-tender: elsewhere  Active Range of Motion: pain with flexion  Strength: quad  5/5 and Hamstrings  5/5  Special tests: normal Valgus stress test, negative Lachman's test, positive Amalia's      LEFT KNEE   Inspection: AP/lateral alignment normal  Tender: medial patellar facet  Non-tender:   Active Range of Motion: pain with flexion  Strength: quad  5/5 and Hamstrings  5/5  Special tests: " positive Amalia's  No warmth noted over bilateral knees.         Diagnostic Test Results:  none      ASSESSMENT/PLAN:                                                        ICD-10-CM    1. Left knee pain, unspecified chronicity M25.562 PHYSICAL THERAPY REFERRAL     methocarbamol (METHOCARBAMOL) 750 MG tablet     naproxen (NAPROSYN) 500 MG tablet   2. Right knee pain, unspecified chronicity M25.561 PHYSICAL THERAPY REFERRAL     naproxen (NAPROSYN) 500 MG tablet     Robaxin   Naproxen  Continue therapy  Follow up in 4-6 weeks      Brie Calvillo PA-C  [facility]

## 2017-06-13 NOTE — PATIENT INSTRUCTIONS
Robaxin 750 mg three times a day as needed for tightness in the knees and calves  Naproxen 500 mg twice a day with food   Start physical therapy  Follow up in 4-6 weeks

## 2017-06-14 ENCOUNTER — RADIANT APPOINTMENT (OUTPATIENT)
Dept: GENERAL RADIOLOGY | Facility: CLINIC | Age: 57
End: 2017-06-14
Attending: PODIATRIST
Payer: COMMERCIAL

## 2017-06-14 ENCOUNTER — THERAPY VISIT (OUTPATIENT)
Dept: PHYSICAL THERAPY | Facility: CLINIC | Age: 57
End: 2017-06-14
Payer: OTHER MISCELLANEOUS

## 2017-06-14 ENCOUNTER — OFFICE VISIT (OUTPATIENT)
Dept: PODIATRY | Facility: CLINIC | Age: 57
End: 2017-06-14
Payer: COMMERCIAL

## 2017-06-14 VITALS — WEIGHT: 164 LBS | OXYGEN SATURATION: 99 % | BODY MASS INDEX: 29.05 KG/M2 | HEART RATE: 90 BPM

## 2017-06-14 DIAGNOSIS — G89.29 CHRONIC PAIN OF BOTH KNEES: Primary | ICD-10-CM

## 2017-06-14 DIAGNOSIS — M21.6X2 PRONATION DEFORMITY OF BOTH FEET: ICD-10-CM

## 2017-06-14 DIAGNOSIS — M79.672 PAIN IN BOTH FEET: ICD-10-CM

## 2017-06-14 DIAGNOSIS — M25.561 CHRONIC PAIN OF BOTH KNEES: Primary | ICD-10-CM

## 2017-06-14 DIAGNOSIS — M79.671 PAIN IN BOTH FEET: Primary | ICD-10-CM

## 2017-06-14 DIAGNOSIS — M21.6X1 PRONATION DEFORMITY OF BOTH FEET: ICD-10-CM

## 2017-06-14 DIAGNOSIS — M79.671 PAIN IN BOTH FEET: ICD-10-CM

## 2017-06-14 DIAGNOSIS — M25.562 CHRONIC PAIN OF BOTH KNEES: Primary | ICD-10-CM

## 2017-06-14 DIAGNOSIS — M79.672 PAIN IN BOTH FEET: Primary | ICD-10-CM

## 2017-06-14 PROCEDURE — 99243 OFF/OP CNSLTJ NEW/EST LOW 30: CPT | Performed by: PODIATRIST

## 2017-06-14 PROCEDURE — 73630 X-RAY EXAM OF FOOT: CPT | Mod: LT

## 2017-06-14 PROCEDURE — 73630 X-RAY EXAM OF FOOT: CPT | Mod: RT

## 2017-06-14 PROCEDURE — 97161 PT EVAL LOW COMPLEX 20 MIN: CPT | Mod: GP | Performed by: PHYSICAL THERAPIST

## 2017-06-14 ASSESSMENT — ACTIVITIES OF DAILY LIVING (ADL)
WALK: ACTIVITY IS MINIMALLY DIFFICULT
AS_A_RESULT_OF_YOUR_KNEE_INJURY,_HOW_WOULD_YOU_RATE_YOUR_CURRENT_LEVEL_OF_DAILY_ACTIVITY?: NEARLY NORMAL
KNEE_ACTIVITY_OF_DAILY_LIVING_SUM: 56
SIT WITH YOUR KNEE BENT: ACTIVITY IS SOMEWHAT DIFFICULT
SQUAT: ACTIVITY IS SOMEWHAT DIFFICULT
KNEE_ACTIVITY_OF_DAILY_LIVING_SCORE: 80
SWELLING: I DO NOT HAVE THE SYMPTOM
PAIN: I HAVE THE SYMPTOM BUT IT DOES NOT AFFECT MY ACTIVITY
WEAKNESS: I DO NOT HAVE THE SYMPTOM
STIFFNESS: I DO NOT HAVE THE SYMPTOM
GIVING WAY, BUCKLING OR SHIFTING OF KNEE: THE SYMPTOM AFFECTS MY ACTIVITY MODERATELY
STAND: ACTIVITY IS NOT DIFFICULT
GO DOWN STAIRS: ACTIVITY IS MINIMALLY DIFFICULT
HOW_WOULD_YOU_RATE_THE_CURRENT_FUNCTION_OF_YOUR_KNEE_DURING_YOUR_USUAL_DAILY_ACTIVITIES_ON_A_SCALE_FROM_0_TO_100_WITH_100_BEING_YOUR_LEVEL_OF_KNEE_FUNCTION_PRIOR_TO_YOUR_INJURY_AND_0_BEING_THE_INABILITY_TO_PERFORM_ANY_OF_YOUR_USUAL_DAILY_ACTIVITIES?: 80
LIMPING: I HAVE THE SYMPTOM BUT IT DOES NOT AFFECT MY ACTIVITY
RISE FROM A CHAIR: ACTIVITY IS FAIRLY DIFFICULT
HOW_WOULD_YOU_RATE_THE_OVERALL_FUNCTION_OF_YOUR_KNEE_DURING_YOUR_USUAL_DAILY_ACTIVITIES?: NEARLY NORMAL
KNEEL ON THE FRONT OF YOUR KNEE: ACTIVITY IS NOT DIFFICULT
GO UP STAIRS: ACTIVITY IS NOT DIFFICULT
RAW_SCORE: 56

## 2017-06-14 ASSESSMENT — PATIENT HEALTH QUESTIONNAIRE - PHQ9: SUM OF ALL RESPONSES TO PHQ QUESTIONS 1-9: 1

## 2017-06-14 ASSESSMENT — ANXIETY QUESTIONNAIRES: GAD7 TOTAL SCORE: 0

## 2017-06-14 NOTE — PROGRESS NOTES
[facility] Initial Evaluation      Subjective:    Patient is a 57 year old female presenting with rehab right knee hpi. The history is provided by the patient. The history is limited by a language barrier. No  was used.   Nancy Bejarano is a 57 year old female with a bilateral knees condition.  Condition occurred with:  A fall/slip.  Condition occurred: at work.  This is a chronic condition  Pt notes that she injured her L knee at work on 7/6/16, but since then her R knee has started to bother her, about Jan of this year.  She has consulted with her PCP on 6/13/17..    Patient reports pain:  Anterior and medial (on both knees).    Pain is described as aching and is intermittent and reported as 5/10 (10/10 at worst).  Associated symptoms:  Loss of motion/stiffness (pt not providing clear answers to questions). Pain is the same all the time.  Symptoms are exacerbated by walking, sitting, transfers, bending/squatting, ascending stairs, descending stairs, standing and kneeling (not entirely sure about these) and relieved by ice, heat, rest and analgesics.  Since onset symptoms are gradually worsening.  Special tests:  X-ray (for L knee only).  Previous treatment includes physical therapy.  There was no improvement following previous treatment.  General health as reported by patient is good.  Pertinent medical history includes:  Osteoarthritis and high blood pressure.  Medical allergies: yes (listed in Epic).  Other surgeries include:  None reported.  Current medications:  Muscle relaxants and anti-inflammatory (is not taking the Atenolol for blood pressure).  Current occupation is packaging.  Patient is working in normal job without restrictions.  Primary job tasks include:  Prolonged standing, repetitive tasks, lifting and operating a machine (pushing).    Barriers include:  None as reported by the patient.                            Objective:      Gait:    Gait Type:  Normal    Assistive Devices:  None                                                        Knee Evaluation:  ROM:    AROM    Hyperextension:  Left:  5 deg    Right: 5 deg  Extension:  Left: 0 deg    Right:  0 deg  Flexion: Left: 138 deg    Right: 130 deg w/pain    Pain: medially at end range flexion on both knees    Strength:     Extension:  Left: 5/5    Pain:-      Right: 5/5    Pain:-  Flexion:  Left: 5/5    Pain:-      Right: 5/5    Pain:-    Quad Set Left:  Good    Pain: +/-   Quad Set Right:  Good    Pain: +/-      Palpation:    Left knee tenderness present at:  Medial Joint Line and Patellar Superior  Left knee tenderness not present at:  Lateral Joint Line; Patellar Tendon; IT Band; Popliteal; Biceps Femoral; Semitendinosus; Semembranosus; Patellar Medial; Patellar Lateral and Patellar Inferior  Right knee tenderness present at:  Medial Joint Line and Popliteal  Right knee tenderness not present at:  Lateral Joint Line; Patellar Tendon; IT Band; Biceps Femoral; Semitendinosus; Patellar Medial; Patellar Lateral; Patellar Superior and Patellar Inferior  Edema:  Normal    Mobility Testing:  Normal                  General     ROS    Assessment/Plan:      Patient is a 57 year old female with both sides knee complaints.    Patient has the following significant findings with corresponding treatment plan.                Diagnosis 1:  B knee pain  Pain -  self management, education and home program  Impaired muscle performance - neuro re-education and therapeutic exercises  Decreased function - therapeutic activities    Therapy Evaluation Codes:   1) History comprised of:   Personal factors that impact the plan of care:      Education, Language, Past/current experiences and Social history/culture.    Comorbidity factors that impact the plan of care are:      Osteoarthritis.     Medications impacting care: Muscle relaxant.  2) Examination of Body Systems comprised of:   Body structures and functions that impact the plan of care:       Knee.   Activity limitations that impact the plan of care are:      Sitting and Squatting/kneeling.  3) Clinical presentation characteristics are:   Stable/Uncomplicated.  4) Decision-Making    Low complexity using standardized patient assessment instrument and/or measureable assessment of functional outcome.  Cumulative Therapy Evaluation is: Low complexity.    Previous and current functional limitations:  (See Goal Flow Sheet for this information)    Short term and Long term goals: (See Goal Flow Sheet for this information)     Communication ability:  Patient appears to be able to clearly communicate and understand verbal and written communication and follow directions correctly.  Moderate to significant language barriers present today, not sure that the patient was understanding everything that was stated/discussed today.  Treatment Explanation - The following has been discussed with the patient:   RX ordered/plan of care  Anticipated outcomes  Possible risks and side effects  This patient would benefit from PT intervention to resume normal activities.   Rehab potential is good.    Frequency:  1 X week, once daily  Duration:  for 4 weeks  Discharge Plan:  Achieve all LTG.  Independent in home treatment program.  Reach maximal therapeutic benefit.    Please refer to the daily flowsheet for treatment today, total treatment time and time spent performing 1:1 timed codes.

## 2017-06-14 NOTE — Clinical Note
Please see the evaluation report completed in PT today.  Thank you for referring Nancy to us! Cordially,  JOSY Mirza, MPT

## 2017-06-14 NOTE — NURSING NOTE
"Chief Complaint   Patient presents with     Foot Problems     B feet cramping x months       Initial Pulse 90  Wt 74.4 kg (164 lb)  SpO2 99%  BMI 29.05 kg/m2 Estimated body mass index is 29.05 kg/(m^2) as calculated from the following:    Height as of 6/13/17: 1.6 m (5' 3\").    Weight as of this encounter: 74.4 kg (164 lb).  Medication Reconciliation: complete  "

## 2017-06-14 NOTE — MR AVS SNAPSHOT
After Visit Summary   6/14/2017    Nancy Bejarano    MRN: [id number]           Patient Information     Date Of Birth          1960        Visit Information        Provider Department      6/14/2017 9:00 AM Rolo Epps, DPEDDI [facility]        Today's Diagnoses     Pain in both feet    -  1    Pronation deformity of both feet          Care Instructions    We wish you continued good healing. If you have any questions or concerns, please do not hesitate to contact us at [phone]      Please remember to call and schedule a follow up appointment if one was recommended at your earliest convenience.   PODIATRY CLINIC HOURS  TELEPHONE NUMBER    Dr. Rolo PAZPKAMILA FAC FAS    Clinics:  [facility]        Kathia Concepcion MA  Medical Assistant  Tuesday 1PM-6PM  [place]  Wednesday 7AM-2PM  [place]  Thursday 10AM-6PM  [place]  Friday 7AM-345PM  [place]  Specialty schedulers:   [phone] to make an appointment with any Specialty Provider.        Urgent Care locations:    [facility] Monday-Friday 5 pm - 9 pm. Saturday-Sunday 9 am -5pm    Monday-Friday 11 am - 9 pm Saturday 9 am - 5 pm     Monday-Sunday 12 noon-8PM [phone]     If you need a medication refill, please contact us you may need lab work and/or a follow up visit prior to your refill (i.e. Antifungal medications).    WhoSayhart (secure e-mail communication and access to your chart) to send a message or to make an appointment.    If MRI needed please call [facility] at [phone]        Weight management plan: Patient was referred to their PCP to discuss a diet and exercise plan.            Follow-ups after your visit        Your next 10 appointments already scheduled     Jun 14, 2017  9:30 AM CDT   EDWAR Extremity with Shahla Mirza PT   [facility] [place] [facility]  [facility]    [address]  [place] [id number] [phone]              Who to contact     If you have questions or need follow up information about today's clinic visit or your schedule please contact [facility] directly at [phone].  Normal or non-critical lab and imaging results will be communicated to you by MyChart, letter or phone within 4 business days after the clinic has received the results. If you do not hear from us within 7 days, please contact the clinic through MyChart or phone. If you have a critical or abnormal lab result, we will notify you by phone as soon as possible.  Submit refill requests through Kanga or call your pharmacy and they will forward the refill request to us. Please allow 3 business days for your refill to be completed.          Additional Information About Your Visit        Kanga Information     Kanga lets you send messages to your doctor, view your test results, renew your prescriptions, schedule appointments and more. To sign up, go to [email or web address] . Click on \"Log in\" on the left side of the screen, which will take you to the Welcome page. Then click on \"Sign up Now\" on the right side of the page.     You will be asked to enter the access code listed below, as well as some personal information. Please follow the directions to create your username and password.     Your access code is: [email or web address]  Expires: 2017 12:34 PM     Your access code will  in 90 days. If you need help or a new code, please call your [place] clinic or [phone].        Care EveryWhere ID     This is your Care EveryWhere ID. This could be used by other organizations to access your [place] medical records  [id number]        Your Vitals Were     Pulse Pulse Oximetry BMI (Body Mass Index)             90 99% 29.05 kg/m2          Blood Pressure from Last 3 Encounters:   17 130/72   17 106/63 "   12/30/16 151/82    Weight from Last 3 Encounters:   06/14/17 74.4 kg (164 lb)   06/13/17 74.4 kg (164 lb)   06/05/17 73.5 kg (162 lb)               Primary Care Provider Office Phone # Fax #    SHANE Orantes [phone]       [facility] [address]  [facility] [address]        Thank you!     Thank you for choosing [facility]  for your care. Our goal is always to provide you with excellent care. Hearing back from our patients is one way we can continue to improve our services. Please take a few minutes to complete the written survey that you may receive in the mail after your visit with us. Thank you!             Your Updated Medication List - Protect others around you: Learn how to safely use, store and throw away your medicines at www.disposemymeds.org.          This list is accurate as of: 6/14/17  9:18 AM.  Always use your most recent med list.                   Brand Name Dispense Instructions for use    atenolol 50 MG tablet    TENORMIN    90 tablet    Take 1 tablet (50 mg) by mouth daily       cyclobenzaprine 10 MG tablet    FLEXERIL    30 tablet    Take 0.5-1 tablets (5-10 mg) by mouth 2 times daily as needed for muscle spasms       methocarbamol 750 MG tablet    methocarbamol    60 tablet    Take 1 tablet (750 mg) by mouth 3 times daily as needed for muscle spasms       naproxen 500 MG tablet    NAPROSYN    60 tablet    Take 1 tablet (500 mg) by mouth 2 times daily as needed for moderate pain       order for DME     1 Device    Equipment being ordered: [id number] $144, . Knee, Hinged, Lg blk neoprene

## 2017-06-14 NOTE — PATIENT INSTRUCTIONS
We wish you continued good healing. If you have any questions or concerns, please do not hesitate to contact us at [phone]      Please remember to call and schedule a follow up appointment if one was recommended at your earliest convenience.   PODIATRY CLINIC HOURS  TELEPHONE NUMBER    Dr. Rolo Epps D.P.M [facility]    Clinics:  [facility]        Kathia Concepcion MA  Medical Assistant  Tuesday 1PM-6PM  [place]  Wednesday 7AM-2PM  [place]  Thursday 10AM-6PM  [place] Friday 7AM-345PM  [place]  Specialty schedulers:   [phone] to make an appointment with any Specialty Provider.        Urgent Care locations:    [facility] Monday-Friday 5 pm - 9 pm. Saturday-Sunday 9 am -5pm    Monday-Friday 11 am - 9 pm Saturday 9 am - 5 pm     Monday-Sunday 12 noon-8PM [phone]     If you need a medication refill, please contact us you may need lab work and/or a follow up visit prior to your refill (i.e. Antifungal medications).    Livescribet (secure e-mail communication and access to your chart) to send a message or to make an appointment.    If MRI needed please call [facility] at [phone]        Weight management plan: Patient was referred to their PCP to discuss a diet and exercise plan.

## 2017-06-14 NOTE — MR AVS SNAPSHOT
"              After Visit Summary   6/14/2017    Nancy Bejarano    MRN: [id number]           Patient Information     Date Of Birth          1960        Visit Information        Provider Department      6/14/2017 9:30 AM Shahla Mirza, PT [facility]        Today's Diagnoses     Chronic pain of both knees    -  1       Follow-ups after your visit        Your next 10 appointments already scheduled     Jun 23, 2017 10:10 AM CDT   EDWAR Extremity with Gwen Calixto, PT   [facility] (ED[facility] [place]  )    [address]  [facility] [id number] [phone]              Who to contact     If you have questions or need follow up information about today's clinic visit or your schedule please contact [facility] directly at [phone].  Normal or non-critical lab and imaging results will be communicated to you by Spreadknowledgehart, letter or phone within 4 business days after the clinic has received the results. If you do not hear from us within 7 days, please contact the clinic through Spreadknowledgehart or phone. If you have a critical or abnormal lab result, we will notify you by phone as soon as possible.  Submit refill requests through Hythiam or call your pharmacy and they will forward the refill request to us. Please allow 3 business days for your refill to be completed.          Additional Information About Your Visit        MyChart Information     Hythiam lets you send messages to your doctor, view your test results, renew your prescriptions, schedule appointments and more. To sign up, go to [email or web address] . Click on \"Log in\" on the left side of the screen, which will take you to the Welcome page. Then click on \"Sign up Now\" on the right side of the page.     You will be asked to enter the access code listed below, as well as some personal information. Please follow the directions to create your username " and password.     Your access code is: [email or web address]  Expires: 2017 12:34 PM     Your access code will  in 90 days. If you need help or a new code, please call your [facility] or [phone].        Care EveryWhere ID     This is your Care EveryWhere ID. This could be used by other organizations to access your [place] medical records  [id number]         Blood Pressure from Last 3 Encounters:   17 130/72   17 106/63   16 151/82    Weight from Last 3 Encounters:   17 74.4 kg (164 lb)   17 74.4 kg (164 lb)   17 73.5 kg (162 lb)              We Performed the Following     HC PT EVAL, LOW COMPLEXITY     EDWAR INITIAL EVAL REPORT        Primary Care Provider Office Phone # Fax #    Yakelin SHANE Mills [phone]       [facility] [address] [facility] [address]        Thank you!     Thank you for choosing [facility]  for your care. Our goal is always to provide you with excellent care. Hearing back from our patients is one way we can continue to improve our services. Please take a few minutes to complete the written survey that you may receive in the mail after your visit with us. Thank you!             Your Updated Medication List - Protect others around you: Learn how to safely use, store and throw away your medicines at www.disposemymeds.org.          This list is accurate as of: 17 12:04 PM.  Always use your most recent med list.                   Brand Name Dispense Instructions for use    atenolol 50 MG tablet    TENORMIN    90 tablet    Take 1 tablet (50 mg) by mouth daily       cyclobenzaprine 10 MG tablet    FLEXERIL    30 tablet    Take 0.5-1 tablets (5-10 mg) by mouth 2 times daily as needed for muscle spasms       methocarbamol 750 MG tablet    methocarbamol    60 tablet    Take 1 tablet (750 mg) by mouth 3 times daily as needed for muscle spasms       naproxen 500 MG tablet    NAPROSYN     60 tablet    Take 1 tablet (500 mg) by mouth 2 times daily as needed for moderate pain       order for DME     1 Device    Equipment being ordered: [id number] $144, . Knee, Hinged, Lg blk neoprene

## 2017-06-14 NOTE — PROGRESS NOTES
S:  Patient seen in consult from Dr. Souza and complains of bilateral foot pain.  Points to medial arch.  Has had this for 1 years?  Describes it as a burning pain.  Aggrevated by activity and relieved by rest.  Getting worse lately.  Also complains of numbness in ball of right foot.  Hard to localize.  History of knee injury at work.  Has tried cold water bottle, massaging.  Not wearing shoes in the house.          ROS:  Denies bruising, swelling,weakness, or numbness.       Allergies   Allergen Reactions     Asa [Dihydroxyaluminum Aminoacetate] Other (See Comments)     Epigastric   pain       Current Outpatient Prescriptions   Medication Sig Dispense Refill     methocarbamol (METHOCARBAMOL) 750 MG tablet Take 1 tablet (750 mg) by mouth 3 times daily as needed for muscle spasms 60 tablet 0     naproxen (NAPROSYN) 500 MG tablet Take 1 tablet (500 mg) by mouth 2 times daily as needed for moderate pain 60 tablet 1     cyclobenzaprine (FLEXERIL) 10 MG tablet Take 0.5-1 tablets (5-10 mg) by mouth 2 times daily as needed for muscle spasms 30 tablet 1     atenolol (TENORMIN) 50 MG tablet Take 1 tablet (50 mg) by mouth daily 90 tablet 3     order for DME Equipment being ordered: [id number] $144, . Knee, Hinged, Lg blk neoprene 1 Device 0       Patient Active Problem List   Diagnosis     Hypertension goal BP (blood pressure) < 140/90     Hyperlipidemia LDL goal <130     Back pain     GERD (gastroesophageal reflux disease)     Abdominal pain, unspecified abdominal location     Cataracts, both eyes     Posterior vitreous detachment of both eyes     Cervicalgia     Chronic pain of left knee     Left knee pain, unspecified chronicity       Past Medical History:   Diagnosis Date     Cataracts, both eyes 8/24/2012     Hyperlipidemia LDL goal <130 12/27/2011     Hypertension      TB (tuberculosis)        No past surgical history on file.    Family History   Problem Relation Age of Onset     DIABETES No family hx of       Hypertension No family hx of      Breast Cancer No family hx of      Cancer - colorectal No family hx of      CANCER No family hx of      CEREBROVASCULAR DISEASE No family hx of      Thyroid Disease No family hx of      Glaucoma No family hx of      Macular Degeneration No family hx of        Social History   Substance Use Topics     Smoking status: Never Smoker     Smokeless tobacco: Never Used      Comment: lives in smoke free household.     Alcohol use No         O:  Pulse 90  Wt 74.4 kg (164 lb)  SpO2 99%  BMI 29.05 kg/m2.  Seen with .  Very hard to understand and she often does not answer my questions.   Pulses DP, PT 2/4 b/l.  CRT < 3 seconds X 10 digits.  Mild ankle edema or varicosities noted.  Sensation to light touch intact b/l.  Reflexes 2/4 b/l.  Skin has normal texture and turgor b/l.  No forefoot deformities noted.  MS 5/5 all compartments.  Normal ROM all fore foot and rearfoot joints.  No equinus.  With weightbearing patient has bilateral pronation.  No pain with palpation or ROM.  No pain with stressing any muscle compartments.  No pain under met heads.  Negative Temi's click in all interspaces.  Good calcaneal iversion with foot flexion.  no erythema edema or ecchymosis or masses noted.  X-rays normal.      A:  Pronation causing pain       Right neuroma?    P:  X-rays taken of both feet.  Explained  to patient she has pronation causing foot discomfort.  RX for custom orthotics.  Discussed importance of wearing these in a good shoe at all times to prevent future problems.  Discussed good house shoes at all times until resolved.  Avoid activities that bother this.   RETURN TO CLINIC PRN.  Thank you for allowing me participate in the care of this patient.        Rolo Epps DPM, FACFAS

## 2017-06-16 ENCOUNTER — OFFICE VISIT (OUTPATIENT)
Dept: FAMILY MEDICINE | Facility: CLINIC | Age: 57
End: 2017-06-16
Payer: COMMERCIAL

## 2017-06-16 VITALS
OXYGEN SATURATION: 99 % | SYSTOLIC BLOOD PRESSURE: 138 MMHG | HEIGHT: 63 IN | HEART RATE: 94 BPM | DIASTOLIC BLOOD PRESSURE: 80 MMHG | WEIGHT: 164 LBS | TEMPERATURE: 97.5 F | BODY MASS INDEX: 29.06 KG/M2

## 2017-06-16 DIAGNOSIS — I10 HYPERTENSION GOAL BP (BLOOD PRESSURE) < 140/90: ICD-10-CM

## 2017-06-16 DIAGNOSIS — M25.562 CHRONIC PAIN OF BOTH KNEES: ICD-10-CM

## 2017-06-16 DIAGNOSIS — Z12.31 VISIT FOR SCREENING MAMMOGRAM: Primary | ICD-10-CM

## 2017-06-16 DIAGNOSIS — M25.561 CHRONIC PAIN OF BOTH KNEES: ICD-10-CM

## 2017-06-16 DIAGNOSIS — G89.29 CHRONIC PAIN OF BOTH KNEES: ICD-10-CM

## 2017-06-16 DIAGNOSIS — E66.3 OVERWEIGHT (BMI 25.0-29.9): ICD-10-CM

## 2017-06-16 PROCEDURE — 99214 OFFICE O/P EST MOD 30 MIN: CPT | Performed by: NURSE PRACTITIONER

## 2017-06-16 NOTE — MR AVS SNAPSHOT
After Visit Summary   6/16/2017    Nancy Bejarano    MRN: [id number]           Patient Information     Date Of Birth          1960        Visit Information        Provider Department      6/16/2017 10:00 AM Yakelin Caldwell APRN CNP [facility]        Today's Diagnoses     Visit for screening mammogram    -  1    Chronic pain of both knees        Hypertension goal BP (blood pressure) < 140/90        Overweight (BMI 25.0-29.9)          Care Instructions    Based on your medical history and these are the current health maintenance or preventive care services that you are due for (some may have been done at this visit)  Health Maintenance Due   Topic Date Due     ADVANCE DIRECTIVE PLANNING Q5 YRS  02/14/1978     EYE EXAM Q1 YEAR  07/16/2015     MAMMO SCREEN Q2 YR (SYSTEM ASSIGNED)  08/05/2016         At [facility], we strive to deliver an exceptional experience to you, every time we see you.    If you receive a survey in the mail, please send us back your thoughts. We really do value your feedback.    Your care team's suggested websites for health information:  Www.[employer].org : Up to date and easily searchable information on multiple topics.  Www.medlineplus.gov : medication info, interactive tutorials, watch real surgeries online  Www.familydoctor.org : good info from the Academy of Family Physicians  Www.cdc.gov : public health info, travel advisories, epidemics (H1N1)  Www.aap.org : children's health info, normal development, vaccinations  Www.health.state[place] : [place] dept of health, public health issues in [place], N1N1    How to contact your care team:   Team [facility] [phone]         Pharmacy [phone]    Dr. Ayers, Shefali Calvillo PA-C, Dr. Goldberg, Molly LYNN CNP, Lis Mosquera PA-C, Dr. Acosta, and SHANE Higuera CNP    Team RNs: Sarah Enriquez      Clinic hours  M-Th 7 am-7 pm   Fri 7 am-5 pm.   Urgent care M-F 11  am-9 pm,   Sat/Sun 9 am-5 pm.  Pharmacy M- 8 am-8 pm Fri 8 am-6 pm  Sat/Sun 9 am-5 pm.     All password changes, disabled accounts, or ID changes in [employer]/MyHealth will be done by our Access Services Department.    If you need help with your account or password, call: [phone]. Clinic staff no longer has the ability to change passwords.             Follow-ups after your visit        Follow-up notes from your care team     Return if symptoms worsen or fail to improve.      Your next 10 appointments already scheduled     Jun 23, 2017 10:10 AM CDT   EDWAR Extremity with Gwen Calixto, PT   [facility] [place] ([facility]  )    [address]  [place] [id number] [phone]            Jun 23, 2017  1:00 PM CDT   MA SCREENING DIGITAL BILATERAL with [id number]   [facility]    [address] [facility] [id number] [phone]           Do not use any powder, lotion or deodorant under your arms or on your breast. If you do, we will ask you to remove it before your exam.  Wear comfortable, two-piece clothing.  If you have any allergies, tell your care team.  Bring any previous mammograms from other facilities or have them mailed to the breast center.              Who to contact     If you have questions or need follow up information about today's clinic visit or your schedule please contact [facility] directly at [phone].  Normal or non-critical lab and imaging results will be communicated to you by [employer], letter or phone within 4 business days after the clinic has received the results. If you do not hear from us within 7 days, please contact the clinic through [employer] or phone. If you have a critical or abnormal lab result, we will notify you by phone as soon as possible.  Submit refill requests through [employer] or call your pharmacy and they will forward the refill request  "to us. Please allow 3 business days for your refill to be completed.          Additional Information About Your Visit        MyChart Information     [employer] lets you send messages to your doctor, view your test results, renew your prescriptions, schedule appointments and more. To sign up, go to [email or web address] . Click on \"Log in\" on the left side of the screen, which will take you to the Welcome page. Then click on \"Sign up Now\" on the right side of the page.     You will be asked to enter the access code listed below, as well as some personal information. Please follow the directions to create your username and password.     Your access code is: [email or web address]  Expires: 2017 12:34 PM     Your access code will  in 90 days. If you need help or a new code, please call your [place] clinic or [phone].        Care EveryWhere ID     This is your Care EveryWhere ID. This could be used by other organizations to access your [place] medical records  [id number]        Your Vitals Were     Pulse Temperature Height Pulse Oximetry BMI (Body Mass Index)       94 97.5  F (36.4  C) (Oral) 5' 3\" (1.6 m) 99% 29.05 kg/m2        Blood Pressure from Last 3 Encounters:   17 138/80   17 130/72   17 106/63    Weight from Last 3 Encounters:   17 164 lb (74.4 kg)   17 164 lb (74.4 kg)   17 164 lb (74.4 kg)              Today, you had the following     No orders found for display       Primary Care Provider Office Phone # Fax #    SHANE Orantes [phone]       [facility] [address]  [facility] [address]        Thank you!     Thank you for choosing [facility]  for your care. Our goal is always to provide you with excellent care. Hearing back from our patients is one way we can continue to improve our services. Please take a few minutes to complete the written survey that you may receive in the mail after your visit with us. " Thank you!             Your Updated Medication List - Protect others around you: Learn how to safely use, store and throw away your medicines at www.disposemymeds.org.          This list is accurate as of: 6/16/17  5:49 PM.  Always use your most recent med list.                   Brand Name Dispense Instructions for use    atenolol 50 MG tablet    TENORMIN    90 tablet    Take 1 tablet (50 mg) by mouth daily       cyclobenzaprine 10 MG tablet    FLEXERIL    30 tablet    Take 0.5-1 tablets (5-10 mg) by mouth 2 times daily as needed for muscle spasms       methocarbamol 750 MG tablet    methocarbamol    60 tablet    Take 1 tablet (750 mg) by mouth 3 times daily as needed for muscle spasms       naproxen 500 MG tablet    NAPROSYN    60 tablet    Take 1 tablet (500 mg) by mouth 2 times daily as needed for moderate pain       order for DME     1 Device    Equipment being ordered: [id number] $144, . Knee, Hinged, Lg blk neoprene

## 2017-06-16 NOTE — PATIENT INSTRUCTIONS
Based on your medical history and these are the current health maintenance or preventive care services that you are due for (some may have been done at this visit)  Health Maintenance Due   Topic Date Due     ADVANCE DIRECTIVE PLANNING Q5 YRS  02/14/1978     EYE EXAM Q1 YEAR  07/16/2015     MAMMO SCREEN Q2 YR (SYSTEM ASSIGNED)  08/05/2016         At [facility], we strive to deliver an exceptional experience to you, every time we see you.    If you receive a survey in the mail, please send us back your thoughts. We really do value your feedback.    Your care team's suggested websites for health information:  Www.[employer].org : Up to date and easily searchable information on multiple topics.  Www.medlineplus.gov : medication info, interactive tutorials, watch real surgeries online  Www.familydoctor.org : good info from the Academy of Family Physicians  Www.cdc.gov : public health info, travel advisories, epidemics (H1N1)  Www.aap.org : children's health info, normal development, vaccinations  Www.health.[facility].[place].us : [place] dept of health, public health issues in [place], N1N1    How to contact your care team:   Team [facility] [phone]         Pharmacy [phone]    Dr. Ayers, Shefali Calvillo PA-C, Dr. Goldberg, Molly LYNN CNP, Lis Mosquera PA-C, Dr. Acosta, and SHANE Higuera CNP    Team RNs: Sarah & Zoe      Clinic hours  M-Th 7 am-7 pm   Fri 7 am-5 pm.   Urgent care M-F 11 am-9 pm,   Sat/Sun 9 am-5 pm.  Pharmacy M-Th 8 am-8 pm Fri 8 am-6 pm  Sat/Sun 9 am-5 pm.     All password changes, disabled accounts, or ID changes in [employer]/MyHealth will be done by our Access Services Department.    If you need help with your account or password, call: [phone]. Clinic staff no longer has the ability to change passwords.

## 2017-06-16 NOTE — NURSING NOTE
"Chief Complaint   Patient presents with     Work Comp     Patient Request for Note/Letter       Initial /80 (BP Location: Left arm, Patient Position: Sitting, Cuff Size: Adult Regular)  Pulse 94  Temp 97.5  F (36.4  C) (Oral)  Ht 5' 3\" (1.6 m)  Wt 164 lb (74.4 kg)  SpO2 99%  BMI 29.05 kg/m2 Estimated body mass index is 29.05 kg/(m^2) as calculated from the following:    Height as of this encounter: 5' 3\" (1.6 m).    Weight as of this encounter: 164 lb (74.4 kg).  Medication Reconciliation: complete   Arleen Mathew MA      "

## 2017-06-16 NOTE — PROGRESS NOTES
"  SUBJECTIVE:                                                    Nancy Bejarano is a 57 year old female who presents to clinic today for the following health issues:      Concern: Pt needs clearance to return back to work without restrictions.   She is no longer having left knee pain and is ready to return to work.  She works at [employer] as a , works in a warehouse walking on concrete floor.  Pain is worse after a day of work, improved with rest.  She has seen physical therapy and been given HEP which she is following.        Problem list and histories reviewed & adjusted, as indicated.  Additional history: as documented    BP Readings from Last 3 Encounters:   06/16/17 138/80   06/13/17 130/72   06/05/17 106/63    Wt Readings from Last 3 Encounters:   06/16/17 164 lb (74.4 kg)   06/14/17 164 lb (74.4 kg)   06/13/17 164 lb (74.4 kg)                  Labs reviewed in EPIC    Reviewed and updated as needed this visit by clinical staff       Reviewed and updated as needed this visit by Provider         ROS:  Constitutional, HEENT, cardiovascular, pulmonary, gi and gu systems are negative, except as otherwise noted.    OBJECTIVE:                                                    /80 (BP Location: Left arm, Patient Position: Sitting, Cuff Size: Adult Regular)  Pulse 94  Temp 97.5  F (36.4  C) (Oral)  Ht 5' 3\" (1.6 m)  Wt 164 lb (74.4 kg)  SpO2 99%  BMI 29.05 kg/m2  Body mass index is 29.05 kg/(m^2).  GENERAL: healthy, alert and no distress  NECK: no adenopathy, no asymmetry, masses, or scars and thyroid normal to palpation  RESP: lungs clear to auscultation - no rales, rhonchi or wheezes  CV: regular rate and rhythm, normal S1 S2, no S3 or S4, no murmur, click or rub, no peripheral edema and peripheral pulses strong  ABDOMEN: soft, nontender, no hepatosplenomegaly, no masses and bowel sounds normal  MS: no gross musculoskeletal defects noted, no edema  SKIN: no suspicious lesions or rashes  NEURO: " "Normal strength and tone, mentation intact and speech normal  PSYCH: mentation appears normal, affect normal/bright    Diagnostic Test Results:  none      ASSESSMENT/PLAN:                                                          BMI:   Estimated body mass index is 29.05 kg/(m^2) as calculated from the following:    Height as of this encounter: 5' 3\" (1.6 m).    Weight as of this encounter: 164 lb (74.4 kg).   Weight management plan: Discussed healthy diet and exercise guidelines and patient will follow up in 12 months in clinic to re-evaluate.      1. Chronic pain of both knees  Ok to return to work, note for employer given to patient.    2. Hypertension goal BP (blood pressure) < 140/90  BP well controlled, cotninue low salt diet, regular exercise, weight loss efforts.    3. Visit for screening mammogram  Advised her to schedule mamamogram.    4. Overweight, BMI 25.0-29.9  Benefits of weight loss reviewed in detail, encouraged her to cut back on the carbohydrates in the diet, consume more fruits and vegetables, drink plenty of water, avoid fruit juices, sodas, get 150 min moderate exercise/week.  Recheck weight in 6 months.    See Patient Instructions    SHANE Colón [facility]    "

## 2017-06-16 NOTE — LETTER
REPORT OF WORK ABILITY    [address] [address]  [phone]      PATIENT DATA    Employee Name: Nancy Bejarano      : 1960     #: [id number]    Work related injury: Yes  Employer at time of injury: 16  Employer contact & phone: [employer]  Employed elsewhere? No  Workers' Compensation Carrier/Managed Care Plan:  Unknown    Today's date: 2017  Date of injury: 17  Date of first visit: 17    PROVIDER EVALUATION: Please fill in as needed.  Please give copy to employee for employer.    1. Diagnosis: left knee contusion, left knee pain, right knee pain    2. Treatment: Ibuprofen, Physical therapy  3. Medication: Ibuprofen  NOTE: When ordering a medication, [place] Rules require Work Comp or WC on prescriptions.    4.. Return to work date: 17   ** WITH RESTRICTIONS? No restrictions      RESTRICTIONS: Unlimited unless listed.  Restrictions apply to home and leisure also.  If work restrictions is not available, the employee is totally disabled.    Maximum Medical Improvement (Date): to be determined  Any Permanent Partial Disability? Deferred to future exam/consult.    Provider comments: Patient to continue with physical therapy as directed    Medical Examiner: Yakelin LYNN CNP          License or registration: APRN, CNP    Next appointment: 1 month    CC: Employer, Managed Care Plan/Payor, Patient

## 2017-06-23 ENCOUNTER — THERAPY VISIT (OUTPATIENT)
Dept: PHYSICAL THERAPY | Facility: CLINIC | Age: 57
End: 2017-06-23
Payer: OTHER MISCELLANEOUS

## 2017-06-23 DIAGNOSIS — G89.29 CHRONIC PAIN OF BOTH KNEES: ICD-10-CM

## 2017-06-23 DIAGNOSIS — M25.562 CHRONIC PAIN OF BOTH KNEES: ICD-10-CM

## 2017-06-23 DIAGNOSIS — M25.561 CHRONIC PAIN OF BOTH KNEES: ICD-10-CM

## 2017-06-23 PROCEDURE — 97110 THERAPEUTIC EXERCISES: CPT | Mod: GP | Performed by: PHYSICAL THERAPIST

## 2017-07-05 ENCOUNTER — RADIANT APPOINTMENT (OUTPATIENT)
Dept: GENERAL RADIOLOGY | Facility: CLINIC | Age: 57
End: 2017-07-05
Attending: PHYSICIAN ASSISTANT
Payer: COMMERCIAL

## 2017-07-05 ENCOUNTER — OFFICE VISIT (OUTPATIENT)
Dept: FAMILY MEDICINE | Facility: CLINIC | Age: 57
End: 2017-07-05
Payer: COMMERCIAL

## 2017-07-05 ENCOUNTER — TELEPHONE (OUTPATIENT)
Dept: PODIATRY | Facility: CLINIC | Age: 57
End: 2017-07-05

## 2017-07-05 VITALS
OXYGEN SATURATION: 100 % | DIASTOLIC BLOOD PRESSURE: 82 MMHG | TEMPERATURE: 98.3 F | WEIGHT: 163 LBS | HEART RATE: 91 BPM | SYSTOLIC BLOOD PRESSURE: 144 MMHG | BODY MASS INDEX: 28.87 KG/M2

## 2017-07-05 DIAGNOSIS — R52 BODY ACHES: ICD-10-CM

## 2017-07-05 DIAGNOSIS — R25.2 CRAMP OF LIMB: ICD-10-CM

## 2017-07-05 DIAGNOSIS — I10 HYPERTENSION GOAL BP (BLOOD PRESSURE) < 140/90: Primary | ICD-10-CM

## 2017-07-05 DIAGNOSIS — M25.562 CHRONIC PAIN OF BOTH KNEES: ICD-10-CM

## 2017-07-05 DIAGNOSIS — G89.29 CHRONIC PAIN OF BOTH KNEES: ICD-10-CM

## 2017-07-05 DIAGNOSIS — M79.672 LEFT FOOT PAIN: ICD-10-CM

## 2017-07-05 DIAGNOSIS — M25.561 CHRONIC PAIN OF BOTH KNEES: ICD-10-CM

## 2017-07-05 DIAGNOSIS — M79.671 RIGHT FOOT PAIN: Primary | ICD-10-CM

## 2017-07-05 LAB
ALBUMIN SERPL-MCNC: 3.7 G/DL (ref 3.4–5)
ALP SERPL-CCNC: 74 U/L (ref 40–150)
ALT SERPL W P-5'-P-CCNC: 32 U/L (ref 0–50)
ANION GAP SERPL CALCULATED.3IONS-SCNC: 6 MMOL/L (ref 3–14)
AST SERPL W P-5'-P-CCNC: 20 U/L (ref 0–45)
BILIRUB SERPL-MCNC: 0.4 MG/DL (ref 0.2–1.3)
BUN SERPL-MCNC: 11 MG/DL (ref 7–30)
CALCIUM SERPL-MCNC: 8.8 MG/DL (ref 8.5–10.1)
CHLORIDE SERPL-SCNC: 106 MMOL/L (ref 94–109)
CO2 SERPL-SCNC: 27 MMOL/L (ref 20–32)
CREAT SERPL-MCNC: 0.74 MG/DL (ref 0.52–1.04)
ERYTHROCYTE [DISTWIDTH] IN BLOOD BY AUTOMATED COUNT: 15.3 % (ref 10–15)
ERYTHROCYTE [SEDIMENTATION RATE] IN BLOOD BY WESTERGREN METHOD: 12 MM/H (ref 0–30)
GFR SERPL CREATININE-BSD FRML MDRD: 81 ML/MIN/1.7M2
GLUCOSE SERPL-MCNC: 94 MG/DL (ref 70–99)
HCT VFR BLD AUTO: 37.1 % (ref 35–47)
HGB BLD-MCNC: 12.1 G/DL (ref 11.7–15.7)
MCH RBC QN AUTO: 25.4 PG (ref 26.5–33)
MCHC RBC AUTO-ENTMCNC: 32.6 G/DL (ref 31.5–36.5)
MCV RBC AUTO: 78 FL (ref 78–100)
PLATELET # BLD AUTO: 246 10E9/L (ref 150–450)
POTASSIUM SERPL-SCNC: 4.2 MMOL/L (ref 3.4–5.3)
PROT SERPL-MCNC: 7.4 G/DL (ref 6.8–8.8)
RBC # BLD AUTO: 4.77 10E12/L (ref 3.8–5.2)
SODIUM SERPL-SCNC: 139 MMOL/L (ref 133–144)
WBC # BLD AUTO: 4 10E9/L (ref 4–11)

## 2017-07-05 PROCEDURE — 85027 COMPLETE CBC AUTOMATED: CPT | Performed by: PHYSICIAN ASSISTANT

## 2017-07-05 PROCEDURE — 36415 COLL VENOUS BLD VENIPUNCTURE: CPT | Performed by: PHYSICIAN ASSISTANT

## 2017-07-05 PROCEDURE — 73565 X-RAY EXAM OF KNEES: CPT

## 2017-07-05 PROCEDURE — 85652 RBC SED RATE AUTOMATED: CPT | Performed by: PHYSICIAN ASSISTANT

## 2017-07-05 PROCEDURE — 99214 OFFICE O/P EST MOD 30 MIN: CPT | Performed by: PHYSICIAN ASSISTANT

## 2017-07-05 PROCEDURE — 80053 COMPREHEN METABOLIC PANEL: CPT | Performed by: PHYSICIAN ASSISTANT

## 2017-07-05 RX ORDER — ATENOLOL 50 MG/1
50 TABLET ORAL DAILY
Qty: 90 TABLET | Refills: 1 | Status: SHIPPED | OUTPATIENT
Start: 2017-07-05 | End: 2018-03-13

## 2017-07-05 RX ORDER — CYCLOBENZAPRINE HCL 10 MG
5-10 TABLET ORAL 2 TIMES DAILY PRN
Qty: 60 TABLET | Refills: 1 | Status: SHIPPED | OUTPATIENT
Start: 2017-07-05 | End: 2018-03-13

## 2017-07-05 NOTE — PROGRESS NOTES
SUBJECTIVE:                                                    Nancy Bejarano is a 57 year old female who presents to clinic today for the following health issues:      Pain      Duration: ongoing, generalized.  She will have pain in her on left ring finger  And over entire body.      Description (location/character/radiation): on and off, pain throughout entire body    Intensity:  8/10    Accompanying signs and symptoms: none    History (similar episodes/previous evaluation): yes, in knees.     Precipitating or alleviating factors: None    Therapies tried and outcome: previously prescribed meds caused headaches/high bp     Tried physical therapy and this did help with knee pain.    No swelling noted.       Muscle relaxer (robaxin) caused headaches and she thinks it caused her BP to go high.    Takes atenolol when her BP is high, as needed.            Problem list and histories reviewed & adjusted, as indicated.  Additional history: as documented    Patient Active Problem List   Diagnosis     Hypertension goal BP (blood pressure) < 140/90     Hyperlipidemia LDL goal <130     Back pain     GERD (gastroesophageal reflux disease)     Abdominal pain, unspecified abdominal location     Cataracts, both eyes     Posterior vitreous detachment of both eyes     Cervicalgia     Left knee pain, unspecified chronicity     Chronic pain of both knees     Overweight (BMI 25.0-29.9)     History reviewed. No pertinent surgical history.    Social History   Substance Use Topics     Smoking status: Never Smoker     Smokeless tobacco: Never Used      Comment: lives in smoke free household.     Alcohol use No     Family History   Problem Relation Age of Onset     DIABETES No family hx of      Hypertension No family hx of      Breast Cancer No family hx of      Cancer - colorectal No family hx of      CANCER No family hx of      CEREBROVASCULAR DISEASE No family hx of      Thyroid Disease No family hx of      Glaucoma No family hx of       Macular Degeneration No family hx of          Current Outpatient Prescriptions   Medication Sig Dispense Refill     atenolol (TENORMIN) 50 MG tablet Take 1 tablet (50 mg) by mouth daily 90 tablet 1     cyclobenzaprine (FLEXERIL) 10 MG tablet Take 0.5-1 tablets (5-10 mg) by mouth 2 times daily as needed for muscle spasms 60 tablet 1     [DISCONTINUED] atenolol (TENORMIN) 50 MG tablet Take 1 tablet (50 mg) by mouth daily (Patient not taking: Reported on 7/5/2017) 90 tablet 3     order for DME Equipment being ordered: [id number] $144, . Knee, Hinged, Lg blk neoprene (Patient not taking: Reported on 7/5/2017) 1 Device 0     BP Readings from Last 3 Encounters:   07/05/17 144/82   06/16/17 138/80   06/13/17 130/72    Wt Readings from Last 3 Encounters:   07/05/17 163 lb (73.9 kg)   06/16/17 164 lb (74.4 kg)   06/14/17 164 lb (74.4 kg)                    Reviewed and updated as needed this visit by clinical staff       Reviewed and updated as needed this visit by Provider         ROS:  Constitutional, HEENT, cardiovascular, pulmonary, GI, , musculoskeletal, neuro, skin, endocrine and psych systems are negative, except as otherwise noted.    OBJECTIVE:     /82  Pulse 91  Temp 98.3  F (36.8  C) (Oral)  Wt 163 lb (73.9 kg)  SpO2 100%  BMI 28.87 kg/m2  Body mass index is 28.87 kg/(m^2).  GENERAL: healthy, alert and no distress  NECK: no adenopathy, no asymmetry, masses, or scars and thyroid normal to palpation  RESP: lungs clear to auscultation - no rales, rhonchi or wheezes  CV: regular rate and rhythm, normal S1 S2, no S3 or S4, no murmur, click or rub, no peripheral edema and peripheral pulses strong  MS: no gross musculoskeletal defects noted, no edema.    Right ring finger without swelling or warmth.       GAIT: NORMAL  Dorsalis Pedis pulses intact bilaterally.  MUSCULOSKELETAL:  Bilateral KNEEs   Inspection: AP/lateral alignment normal, No effusion  Tender: none  Active Range of Motion: full flexion,  full extension, crepitus noted bilaterally  Strength: full  Special tests: normal Valgus stress test, negative Lachman's test    No warmth noted over bilateral knees.                 Diagnostic Test Results:  none     ASSESSMENT/PLAN:         1. Hypertension goal BP (blood pressure) < 140/90  I do not recommend she use atenolol PRN as it may lead to heart arrhythmias and fluctuations in her pressure.  I suggest she take this consistently and if she develops any symptoms of hypotension, f/u with her PCP for adjustment.    This was discussed extensively with her, however it does not seem like she was understanding the importance of this (may be due to language barrier).  Will monitor.    - atenolol (TENORMIN) 50 MG tablet; Take 1 tablet (50 mg) by mouth daily  Dispense: 90 tablet; Refill: 1    2. Cramp of limb  Will check labs and go from there.  Lympes, RF and NELLY were done about 1 year ago and were normal.  Sed rate in the past was slightly high, repeat Sed rate today is normal.   - Erythrocyte sedimentation rate auto  - CBC with platelets  - Comprehensive metabolic panel  - cyclobenzaprine (FLEXERIL) 10 MG tablet; Take 0.5-1 tablets (5-10 mg) by mouth 2 times daily as needed for muscle spasms  Dispense: 60 tablet; Refill: 1    3. Chronic pain of both knees  Will check for knee arthritis.  May try a topical arthritis cream and will use the muscle relaxer that worked best for her in the past.   - XR Knee AP Standing Bilateral    4. Body aches  As above.  F/u with PCP if no improvement.       FUTURE APPOINTMENTS:       - Follow-up visit If symptoms worsen or fail to improve as anticipated.     Lis Mosquera PA-C  [facility]

## 2017-07-05 NOTE — PATIENT INSTRUCTIONS
Blood pressure:  It is important that you take your blood pressure medication (atenolol) consistently every day.  This is not a medication that should be taking only as needed, as it can lead to heart arrhythmias (abnormal beats) or heart strain if you do that.      For your knees, I suggest you use a topical arthritis cream (such as Bengay or Aspercream). For the cramps, I refilled the flexeril (the first muscle relaxer that you tried), this may cause drowsiness.

## 2017-07-05 NOTE — TELEPHONE ENCOUNTER
LMTC(left mess to call)  Left the Orthotics phone number for the [place] location.    Kathia Concepcion MA

## 2017-07-05 NOTE — MR AVS SNAPSHOT
"              After Visit Summary   7/5/2017    Nancy Bejarano    MRN: [id number]           Patient Information     Date Of Birth          1960        Visit Information        Provider Department      7/5/2017 10:00 AM Lis Mosquera PA-C [facility] [address]        Today's Diagnoses     Hypertension goal BP (blood pressure) < 140/90    -  1    Cramp of limb        Chronic pain of both knees        Body aches          Care Instructions    Blood pressure:  It is important that you take your blood pressure medication (atenolol) consistently every day.  This is not a medication that should be taking only as needed, as it can lead to heart arrhythmias (abnormal beats) or heart strain if you do that.      For your knees, I suggest you use a topical arthritis cream (such as Bengay or Aspercream)            Follow-ups after your visit        Who to contact     If you have questions or need follow up information about today's clinic visit or your schedule please contact [facility] directly at [phone].  Normal or non-critical lab and imaging results will be communicated to you by Lyfthart, letter or phone within 4 business days after the clinic has received the results. If you do not hear from us within 7 days, please contact the clinic through basestonet or phone. If you have a critical or abnormal lab result, we will notify you by phone as soon as possible.  Submit refill requests through Gray[employer] or call your pharmacy and they will forward the refill request to us. Please allow 3 business days for your refill to be completed.          Additional Information About Your Visit        Lyftharfishfishme Information     Graymatics lets you send messages to your doctor, view your test results, renew your prescriptions, schedule appointments and more. To sign up, go to [email or web address] . Click on \"Log in\" on the left side of the screen, which will take you to the Welcome page. Then click on \"Sign " "up Now\" on the right side of the page.     You will be asked to enter the access code listed below, as well as some personal information. Please follow the directions to create your username and password.     Your access code is: [email or web address]  Expires: 2017 12:34 PM     Your access code will  in 90 days. If you need help or a new code, please call your [facility] or [phone].        Care EveryWhere ID     This is your Care EveryWhere ID. This could be used by other organizations to access your [place] medical records  [id number]        Your Vitals Were     Pulse Temperature Pulse Oximetry BMI (Body Mass Index)          91 98.3  F (36.8  C) (Oral) 100% 28.87 kg/m2         Blood Pressure from Last 3 Encounters:   17 144/82   17 138/80   17 130/72    Weight from Last 3 Encounters:   17 163 lb (73.9 kg)   17 164 lb (74.4 kg)   17 164 lb (74.4 kg)              We Performed the Following     CBC with platelets     Comprehensive metabolic panel     Erythrocyte sedimentation rate auto     XR Knee AP Standing Bilateral          Where to get your medicines      These medications were sent to [facility]/pharmacy #[id number] - [facility], [place] - [address]  [address], [place] [place] [address]     Phone:  [phone]     atenolol 50 MG tablet    cyclobenzaprine 10 MG tablet          Primary Care Provider Office Phone # Fax #    Yakelin Caldwell, SHANE [phone]       [facility] [address]  [place] [address]        Equal Access to Services     [facility] Hospital[facility] : Hadii fan stovall hadasho [place], waaxda luqadaha, qaybta kaalmada adeegyada, zay jones . So [facility] [phone].    ATENCIÓN: Si habla español, tiene a salas disposición servicios gratuitos de asistencia lingüística. Llame al [phone].    We comply with applicable federal civil rights laws and [place] laws. We do not discriminate on the basis of race, " color, national origin, age, disability sex, sexual orientation or gender identity.            Thank you!     Thank you for choosing [facility]  for your care. Our goal is always to provide you with excellent care. Hearing back from our patients is one way we can continue to improve our services. Please take a few minutes to complete the written survey that you may receive in the mail after your visit with us. Thank you!             Your Updated Medication List - Protect others around you: Learn how to safely use, store and throw away your medicines at www.disposemymeds.org.          This list is accurate as of: 7/5/17 10:41 AM.  Always use your most recent med list.                   Brand Name Dispense Instructions for use Diagnosis    atenolol 50 MG tablet    TENORMIN    90 tablet    Take 1 tablet (50 mg) by mouth daily    Hypertension goal BP (blood pressure) < 140/90       cyclobenzaprine 10 MG tablet    FLEXERIL    60 tablet    Take 0.5-1 tablets (5-10 mg) by mouth 2 times daily as needed for muscle spasms    Cramp of limb       order for DME     1 Device    Equipment being ordered: [id number] $144, . Knee, Hinged, Lg blk neoprene    Left knee pain

## 2017-07-05 NOTE — TELEPHONE ENCOUNTER
Reason for Call:  Other     Detailed comments: Pt was seen on 6/14/2017 and was referred to [facility] and did not have where to call to make an appointment.    Phone Number Patient can be reached at: Other phone number:  [phone] Immanuel  Best Time: any    Can we leave a detailed message on this number? YES    Call taken on 7/5/2017 at 8:11 AM by Ana Maria Frazier

## 2017-08-31 ENCOUNTER — TELEPHONE (OUTPATIENT)
Dept: FAMILY MEDICINE | Facility: CLINIC | Age: 57
End: 2017-08-31

## 2017-08-31 NOTE — TELEPHONE ENCOUNTER
Reason for Call:  Other call back    Detailed comments: Asking for a letter to clear her work comp case for her foot. Nancy is working and back to normal and requesting a letter stating so. Please call and let her know when and if letter can be written without appointment.     Phone Number Patient can be reached at: Other phone number:   [phone]    Best Time: Any    Can we leave a detailed message on this number? YES    Call taken on 8/31/2017 at 11:25 AM by Guero Bonilla

## 2017-09-01 NOTE — TELEPHONE ENCOUNTER
This writer attempted to contact Nancy on 09/01/17      Reason for call Patient to schedule an office visit for letter and left detailed message.      If patient calls back:   Schedule Office Visit appointment within 1 week with PCP, document that pt called and close encounter .        Sadaf Trejo MA

## 2017-09-08 ENCOUNTER — TELEPHONE (OUTPATIENT)
Dept: FAMILY MEDICINE | Facility: CLINIC | Age: 57
End: 2017-09-08

## 2017-09-08 NOTE — TELEPHONE ENCOUNTER
Team: please attempt to call patient again to advise needs to be seen for a work clearance letter for foot.  Last OV 7/5/17 does not address what patient is requesting.  I do see a work clearance letter from 6/16/17 on file regarding her knees (if this is what she is referring to, please find out if needs reprinted)

## 2017-09-08 NOTE — TELEPHONE ENCOUNTER
Spoke to patient and explained that the last letter we have is from 6/16/17. She states that  She would like that letter faxed to [employer]. Printed letter and holding until patient returns call  With name of person and fax # to send this to.   Sadaf Trejo MA/  For Teams Davina

## 2017-09-08 NOTE — TELEPHONE ENCOUNTER
This writer attempted to contact Nancy on 09/08/17      Reason for call Patient to return call to clarify if Ghada's letter is what she is needing or if this is something else then that would require an office visit and to schedule an appointment and left message to return call.      If patient calls back:   Patient contacted by 2nd floor [facility] Team (TC). Inform patient that someone from the team will contact them, document that pt called and route to care team. .        Sadaf Trejo MA

## 2017-09-08 NOTE — TELEPHONE ENCOUNTER
Reason for Call:  Other letter    Detailed comments: Pt calling for she would like a letter sent to his employer stating  that Pt is well and is able to return back to work.  She would like a call back confirming letter was sent.    Phone Number Patient can be reached at: Home number on file [phone] (home)    Best Time: anytime    Can we leave a detailed message on this number? YES    Call taken on 9/8/2017 at 11:55 AM by John Sharif

## 2017-09-08 NOTE — TELEPHONE ENCOUNTER
John Sharif        9/8/17 11:51 AM   Note      Reason for Call:  Other letter     Detailed comments: Pt calling for she would like a letter sent to his employer stating  that Pt is well and is able to return back to work.  She would like a call back confirming letter was sent.     Phone Number Patient can be reached at: Home number on file [phone] (home)     Best Time: anytime     Can we leave a detailed message on this number? YES     Call taken on 9/8/2017 at 11:55 AM by John Sharif

## 2017-09-11 NOTE — TELEPHONE ENCOUNTER
Spoke to patient and she does not have the Fax # and she will call back,  Holding on to workability letter, postponing message.  Sadaf Trejo MA/  For Teams Davina

## 2017-09-14 NOTE — TELEPHONE ENCOUNTER
Patient still has not called back with fax #.  Closing encounter.  Sadaf Trejo MA/  For Teams Davina

## 2017-09-15 ENCOUNTER — ALLIED HEALTH/NURSE VISIT (OUTPATIENT)
Dept: NURSING | Facility: CLINIC | Age: 57
End: 2017-09-15
Payer: COMMERCIAL

## 2017-09-15 DIAGNOSIS — Z23 NEED FOR PROPHYLACTIC VACCINATION AND INOCULATION AGAINST INFLUENZA: Primary | ICD-10-CM

## 2017-09-15 PROCEDURE — 90471 IMMUNIZATION ADMIN: CPT

## 2017-09-15 PROCEDURE — 99207 ZZC NO CHARGE NURSE ONLY: CPT

## 2017-09-15 PROCEDURE — 90686 IIV4 VACC NO PRSV 0.5 ML IM: CPT

## 2017-09-15 NOTE — MR AVS SNAPSHOT
"              After Visit Summary   9/15/2017    Nancy Bejarano    MRN: [id number]           Patient Information     Date Of Birth          1960        Visit Information        Provider Department      9/15/2017 11:40 AM BK ANCILLARY [facility]        Today's Diagnoses     Need for prophylactic vaccination and inoculation against influenza    -  1       Follow-ups after your visit        Who to contact     If you have questions or need follow up information about today's clinic visit or your schedule please contact [facility] directly at [phone].  Normal or non-critical lab and imaging results will be communicated to you by Uromedicahart, letter or phone within 4 business days after the clinic has received the results. If you do not hear from us within 7 days, please contact the clinic through [employer] or phone. If you have a critical or abnormal lab result, we will notify you by phone as soon as possible.  Submit refill requests through [employer] or call your pharmacy and they will forward the refill request to us. Please allow 3 business days for your refill to be completed.          Additional Information About Your Visit        MyChart Information     [employer] lets you send messages to your doctor, view your test results, renew your prescriptions, schedule appointments and more. To sign up, go to [email or web address] . Click on \"Log in\" on the left side of the screen, which will take you to the Welcome page. Then click on \"Sign up Now\" on the right side of the page.     You will be asked to enter the access code listed below, as well as some personal information. Please follow the directions to create your username and password.     Your access code is: [email or web address]  Expires: 2017 11:52 AM     Your access code will  in 90 days. If you need help or a new code, please call your [facility] or [phone].        Care EveryWhere ID     This is your Care " EveryWhere ID. This could be used by other organizations to access your [place] medical records  [id number]         Blood Pressure from Last 3 Encounters:   07/05/17 144/82   06/16/17 138/80   06/13/17 130/72    Weight from Last 3 Encounters:   07/05/17 163 lb (73.9 kg)   06/16/17 164 lb (74.4 kg)   06/14/17 164 lb (74.4 kg)              We Performed the Following     FLU VAC, SPLIT VIRUS IM > 3 YO (QUADRIVALENT) [00723]     Vaccine Administration, Initial [52947]        Primary Care Provider Office Phone # Fax #    Yakelin ABDI SHANE Caldwell [phone]       [facility] [address]  [facility] [place] [address]        Equal Access to Services     QUINTEN MARES : Hadii aad ku hadasho Soomaali, waaxda luqadaha, qaybta kaalmada adeegyada, waxay lucíain haylamont quan khthomas jones . So [facility] [phone].    ATENCIÓN: Si habla español, tiene a salas disposición servicios gratuitos de asistencia lingüística. Maurice al [phone].    We comply with applicable federal civil rights laws and [place] laws. We do not discriminate on the basis of race, color, national origin, age, disability sex, sexual orientation or gender identity.            Thank you!     Thank you for choosing [facility]  for your care. Our goal is always to provide you with excellent care. Hearing back from our patients is one way we can continue to improve our services. Please take a few minutes to complete the written survey that you may receive in the mail after your visit with us. Thank you!             Your Updated Medication List - Protect others around you: Learn how to safely use, store and throw away your medicines at www.disposemymeds.org.          This list is accurate as of: 9/15/17 11:52 AM.  Always use your most recent med list.                   Brand Name Dispense Instructions for use Diagnosis    atenolol 50 MG tablet    TENORMIN    90 tablet    Take 1 tablet (50 mg) by mouth daily    Hypertension  goal BP (blood pressure) < 140/90       cyclobenzaprine 10 MG tablet    FLEXERIL    60 tablet    Take 0.5-1 tablets (5-10 mg) by mouth 2 times daily as needed for muscle spasms    Cramp of limb       order for DME     1 Device    Equipment being ordered: [id number] $144, . Knee, Hinged, Lg blk neoprene    Left knee pain

## 2017-09-15 NOTE — PROGRESS NOTES
Injectable Influenza Immunization Documentation    1.  Are you sick today? (Fever of 100.5 or higher on the day of the clinic)   No    2.  Have you ever had Guillain-Petersburg Syndrome within 6 weeks of an influenza vaccionation?  No    3. Do you have a life-threatening allergy to eggs?  No    4. Do you have a life-threatening allergy to a component of the vaccine? May include antibiotics, gelatin or latex.  No     5. Have you ever had a reaction to a dose of flu vaccine that needed immediate medical attention?  No    Patient refused MMR.     Form completed by Satish Cordero MA

## 2017-09-28 PROBLEM — M25.562 CHRONIC PAIN OF BOTH KNEES: Status: RESOLVED | Noted: 2017-06-14 | Resolved: 2017-09-28

## 2017-09-28 PROBLEM — G89.29 CHRONIC PAIN OF BOTH KNEES: Status: RESOLVED | Noted: 2017-06-14 | Resolved: 2017-09-28

## 2017-09-28 PROBLEM — M25.561 CHRONIC PAIN OF BOTH KNEES: Status: RESOLVED | Noted: 2017-06-14 | Resolved: 2017-09-28

## 2018-02-17 ENCOUNTER — OFFICE VISIT (OUTPATIENT)
Dept: URGENT CARE | Facility: URGENT CARE | Age: 58
End: 2018-02-17
Payer: COMMERCIAL

## 2018-02-17 VITALS
TEMPERATURE: 97 F | WEIGHT: 167.8 LBS | DIASTOLIC BLOOD PRESSURE: 82 MMHG | BODY MASS INDEX: 29.72 KG/M2 | SYSTOLIC BLOOD PRESSURE: 144 MMHG | HEART RATE: 90 BPM | OXYGEN SATURATION: 97 %

## 2018-02-17 DIAGNOSIS — M25.50 ARTHRALGIA, UNSPECIFIED JOINT: Primary | ICD-10-CM

## 2018-02-17 LAB
BASOPHILS # BLD AUTO: 0 10E9/L (ref 0–0.2)
BASOPHILS NFR BLD AUTO: 0.2 %
DIFFERENTIAL METHOD BLD: ABNORMAL
EOSINOPHIL # BLD AUTO: 0.1 10E9/L (ref 0–0.7)
EOSINOPHIL NFR BLD AUTO: 2.7 %
ERYTHROCYTE [DISTWIDTH] IN BLOOD BY AUTOMATED COUNT: 15.4 % (ref 10–15)
ERYTHROCYTE [SEDIMENTATION RATE] IN BLOOD BY WESTERGREN METHOD: 11 MM/H (ref 0–30)
HCT VFR BLD AUTO: 38.2 % (ref 35–47)
HGB BLD-MCNC: 12.2 G/DL (ref 11.7–15.7)
LYMPHOCYTES # BLD AUTO: 1.9 10E9/L (ref 0.8–5.3)
LYMPHOCYTES NFR BLD AUTO: 43 %
MCH RBC QN AUTO: 24.7 PG (ref 26.5–33)
MCHC RBC AUTO-ENTMCNC: 31.9 G/DL (ref 31.5–36.5)
MCV RBC AUTO: 78 FL (ref 78–100)
MONOCYTES # BLD AUTO: 0.5 10E9/L (ref 0–1.3)
MONOCYTES NFR BLD AUTO: 10.1 %
NEUTROPHILS # BLD AUTO: 2 10E9/L (ref 1.6–8.3)
NEUTROPHILS NFR BLD AUTO: 44 %
PLATELET # BLD AUTO: 244 10E9/L (ref 150–450)
RBC # BLD AUTO: 4.93 10E12/L (ref 3.8–5.2)
WBC # BLD AUTO: 4.4 10E9/L (ref 4–11)

## 2018-02-17 PROCEDURE — 36415 COLL VENOUS BLD VENIPUNCTURE: CPT | Performed by: PHYSICIAN ASSISTANT

## 2018-02-17 PROCEDURE — 85652 RBC SED RATE AUTOMATED: CPT | Performed by: PHYSICIAN ASSISTANT

## 2018-02-17 PROCEDURE — 80048 BASIC METABOLIC PNL TOTAL CA: CPT | Performed by: PHYSICIAN ASSISTANT

## 2018-02-17 PROCEDURE — 85025 COMPLETE CBC W/AUTO DIFF WBC: CPT | Performed by: PHYSICIAN ASSISTANT

## 2018-02-17 PROCEDURE — 86431 RHEUMATOID FACTOR QUANT: CPT | Performed by: PHYSICIAN ASSISTANT

## 2018-02-17 PROCEDURE — 86038 ANTINUCLEAR ANTIBODIES: CPT | Performed by: PHYSICIAN ASSISTANT

## 2018-02-17 PROCEDURE — 99214 OFFICE O/P EST MOD 30 MIN: CPT | Performed by: PHYSICIAN ASSISTANT

## 2018-02-17 PROCEDURE — 86140 C-REACTIVE PROTEIN: CPT | Performed by: PHYSICIAN ASSISTANT

## 2018-02-17 PROCEDURE — 86618 LYME DISEASE ANTIBODY: CPT | Performed by: PHYSICIAN ASSISTANT

## 2018-02-17 NOTE — MR AVS SNAPSHOT
"              After Visit Summary   2018    Nancy Bejarano    MRN: [id number]           Patient Information     Date Of Birth          1960        Visit Information        Provider Department      2018 11:30 AM Yakelin Cordoba PA-C [facility]        Today's Diagnoses     Arthralgia, unspecified joint    -  1       Follow-ups after your visit        Who to contact     If you have questions or need follow up information about today's clinic visit or your schedule please contact [facility] directly at [phone].  Normal or non-critical lab and imaging results will be communicated to you by [employer]hart, letter or phone within 4 business days after the clinic has received the results. If you do not hear from us within 7 days, please contact the clinic through [employer]hart or phone. If you have a critical or abnormal lab result, we will notify you by phone as soon as possible.  Submit refill requests through BetKlub or call your pharmacy and they will forward the refill request to us. Please allow 3 business days for your refill to be completed.          Additional Information About Your Visit        MyChart Information     BetKlub lets you send messages to your doctor, view your test results, renew your prescriptions, schedule appointments and more. To sign up, go to [email or web address] . Click on \"Log in\" on the left side of the screen, which will take you to the Welcome page. Then click on \"Sign up Now\" on the right side of the page.     You will be asked to enter the access code listed below, as well as some personal information. Please follow the directions to create your username and password.     Your access code is: [email or web address]  Expires: 2018 12:49 PM     Your access code will  in 90 days. If you need help or a new code, please call your [facility] or [phone].        Care EveryWhere ID     This is your Care EveryWhere ID. This could be " used by other organizations to access your [place] medical records  [id number]        Your Vitals Were     Pulse Temperature Pulse Oximetry BMI (Body Mass Index)          90 97  F (36.1  C) (Oral) 97% 29.72 kg/m2         Blood Pressure from Last 3 Encounters:   02/17/18 144/82   07/05/17 144/82   06/16/17 138/80    Weight from Last 3 Encounters:   02/17/18 167 lb 12.8 oz (76.1 kg)   07/05/17 163 lb (73.9 kg)   06/16/17 164 lb (74.4 kg)              We Performed the Following     Anti Nuclear Carola IgG by IFA with Reflex     Basic metabolic panel  (Ca, Cl, CO2, Creat, Gluc, K, Na, BUN)     CBC with platelets differential     CRP inflammation     ESR: Erythrocyte sedimentation rate     Lyme Disease Carola with reflex to WB Serum     Rheumatoid factor        Primary Care Provider Office Phone # Fax #    Yakelin JIN Caldwell, SHANE [phone]       [facility] [address]  [place] [address]        Equal Access to Services     QUINTEN MARES : Hadii aad ku hadasho Soomaali, waaxda luqadaha, qaybta kaalmada adeegyada, waxay idiin haylamont jones . So [facility] [phone].    ATENCIÓN: Si habla español, tiene a salas disposición servicios gratuitos de asistencia lingüística. Llame al [phone].    We comply with applicable federal civil rights laws and [place] laws. We do not discriminate on the basis of race, color, national origin, age, disability, sex, sexual orientation, or gender identity.            Thank you!     Thank you for choosing [facility]  for your care. Our goal is always to provide you with excellent care. Hearing back from our patients is one way we can continue to improve our services. Please take a few minutes to complete the written survey that you may receive in the mail after your visit with us. Thank you!             Your Updated Medication List - Protect others around you: Learn how to safely use, store and throw away your medicines at  www.disposemymeds.org.          This list is accurate as of 2/17/18 12:49 PM.  Always use your most recent med list.                   Brand Name Dispense Instructions for use Diagnosis    atenolol 50 MG tablet    TENORMIN    90 tablet    Take 1 tablet (50 mg) by mouth daily    Hypertension goal BP (blood pressure) < 140/90       cyclobenzaprine 10 MG tablet    FLEXERIL    60 tablet    Take 0.5-1 tablets (5-10 mg) by mouth 2 times daily as needed for muscle spasms    Cramp of limb       order for DME     1 Device    Equipment being ordered: [id number] $144, . Knee, Hinged, Lg blk neoprene    Left knee pain

## 2018-02-17 NOTE — PROGRESS NOTES
SUBJECTIVE:   Nancy Bejarano is a 58 year old female who presents to clinic today for the following health issues:      pain      Duration: 1 week     Description (location/character/radiation): pain in joints- knees, shoulders, elbows, hands     Intensity:  moderate    Accompanying signs and symptoms: pain in left side of body    History (similar episodes/previous evaluation): None    Precipitating or alleviating factors: None    Therapies tried and outcome: None       Migratory arthralgias x 1-3 weeks. Worse in AM better as day goes on. No fever, cough, ST.      Allergies   Allergen Reactions     Asa [Dihydroxyaluminum Aminoacetate] Other (See Comments)     Epigastric   pain       Past Medical History:   Diagnosis Date     Cataracts, both eyes 8/24/2012     Hyperlipidemia LDL goal <130 12/27/2011     Hypertension      TB (tuberculosis)          Current Outpatient Prescriptions on File Prior to Visit:  atenolol (TENORMIN) 50 MG tablet Take 1 tablet (50 mg) by mouth daily   cyclobenzaprine (FLEXERIL) 10 MG tablet Take 0.5-1 tablets (5-10 mg) by mouth 2 times daily as needed for muscle spasms   order for DME Equipment being ordered: [id number] $144, . Knee, Hinged, Lg blk neoprene     No current facility-administered medications on file prior to visit.     Social History   Substance Use Topics     Smoking status: Never Smoker     Smokeless tobacco: Never Used      Comment: lives in smoke free household.     Alcohol use No       ROS:  CONSTITUTIONAL: Negative for fatigue or fever.  EYES: Negative for eye problems.  ENT: As above.  RESP: As above.  CV: Negative for chest pains.  GI: Negative for vomiting.  MUSCULOSKELETAL: Positive for joint pains.  NEUROLOGIC: Negative for headaches.  SKIN: Negative for rash.    OBJECTIVE:  /82 (BP Location: Left arm, Patient Position: Chair, Cuff Size: Adult Regular)  Pulse 90  Temp 97  F (36.1  C) (Oral)  Wt 167 lb 12.8 oz (76.1 kg)  SpO2 97%  BMI 29.72  kg/m2  GENERAL APPEARANCE: Healthy, alert and no distress.  EYES:Conjunctiva/sclera clear.  EARS: No cerumen.   Ear canals w/o erythema.  TM's intact w/o erythema.    NOSE/MOUTH: Nose without ulcers, erythema or lesions.  SINUSES: No maxillary sinus tenderness.  THROAT: No erythema w/o tonsillar enlargement . No exudates.  NECK: Supple, nontender, no lymphadenopathy.  RESP: Lungs clear to auscultation - no rales, rhonchi or wheezes  CV: Regular rate and rhythm, normal S1 S2, no murmur noted.  NEURO: Awake, alert    SKIN: No rashes  Joints of arms and legs NT with FROM      ASSESSMENT:     ICD-10-CM    1. Arthralgia, unspecified joint M25.50 CBC with platelets differential     ESR: Erythrocyte sedimentation rate     CRP inflammation     Rheumatoid factor     Anti Nuclear Carola IgG by IFA with Reflex     Lyme Disease Carola with reflex to WB Serum     Basic metabolic panel  (Ca, Cl, CO2, Creat, Gluc, K, Na, BUN)         PLAN:F/U PCP next week. NSAIDs prn.    Yakelin Cordoba PA-C

## 2018-02-17 NOTE — NURSING NOTE
"Chief Complaint   Patient presents with     Pain     Patient complains of pain in abdomen area and left side of leg       Initial /82 (BP Location: Left arm, Patient Position: Chair, Cuff Size: Adult Regular)  Pulse 90  Temp 97  F (36.1  C) (Oral)  Wt 167 lb 12.8 oz (76.1 kg)  SpO2 97%  BMI 29.72 kg/m2 Estimated body mass index is 29.72 kg/(m^2) as calculated from the following:    Height as of 6/16/17: 5' 3\" (1.6 m).    Weight as of this encounter: 167 lb 12.8 oz (76.1 kg).  Medication Reconciliation: complete       Nancy Rivera    "

## 2018-02-18 LAB — RHEUMATOID FACT SER NEPH-ACNC: <20 IU/ML (ref 0–20)

## 2018-02-19 LAB
ANA SER QL IF: NEGATIVE
ANION GAP SERPL CALCULATED.3IONS-SCNC: 5 MMOL/L (ref 3–14)
B BURGDOR IGG+IGM SER QL: 0.04 (ref 0–0.89)
BUN SERPL-MCNC: 13 MG/DL (ref 7–30)
CALCIUM SERPL-MCNC: 8.9 MG/DL (ref 8.5–10.1)
CHLORIDE SERPL-SCNC: 106 MMOL/L (ref 94–109)
CO2 SERPL-SCNC: 27 MMOL/L (ref 20–32)
CREAT SERPL-MCNC: 0.82 MG/DL (ref 0.52–1.04)
CRP SERPL-MCNC: 3.2 MG/L (ref 0–8)
GFR SERPL CREATININE-BSD FRML MDRD: 72 ML/MIN/1.7M2
GLUCOSE SERPL-MCNC: 94 MG/DL (ref 70–99)
POTASSIUM SERPL-SCNC: 3.9 MMOL/L (ref 3.4–5.3)
SODIUM SERPL-SCNC: 138 MMOL/L (ref 133–144)

## 2018-03-13 ENCOUNTER — OFFICE VISIT (OUTPATIENT)
Dept: FAMILY MEDICINE | Facility: CLINIC | Age: 58
End: 2018-03-13
Payer: COMMERCIAL

## 2018-03-13 VITALS
WEIGHT: 165 LBS | SYSTOLIC BLOOD PRESSURE: 130 MMHG | BODY MASS INDEX: 29.23 KG/M2 | HEIGHT: 63 IN | DIASTOLIC BLOOD PRESSURE: 80 MMHG | OXYGEN SATURATION: 100 % | HEART RATE: 90 BPM | TEMPERATURE: 98.2 F

## 2018-03-13 DIAGNOSIS — I10 HYPERTENSION GOAL BP (BLOOD PRESSURE) < 140/90: ICD-10-CM

## 2018-03-13 DIAGNOSIS — S29.012A UPPER BACK STRAIN, INITIAL ENCOUNTER: Primary | ICD-10-CM

## 2018-03-13 PROCEDURE — 99214 OFFICE O/P EST MOD 30 MIN: CPT | Performed by: PHYSICIAN ASSISTANT

## 2018-03-13 RX ORDER — ATENOLOL 50 MG/1
50 TABLET ORAL DAILY
Qty: 90 TABLET | Refills: 1 | Status: SHIPPED | OUTPATIENT
Start: 2018-03-13 | End: 2019-01-25

## 2018-03-13 RX ORDER — METHOCARBAMOL 750 MG/1
750 TABLET, FILM COATED ORAL 3 TIMES DAILY PRN
Qty: 60 TABLET | Refills: 3 | Status: SHIPPED | OUTPATIENT
Start: 2018-03-13 | End: 2019-06-14

## 2018-03-13 ASSESSMENT — PAIN SCALES - GENERAL: PAINLEVEL: MODERATE PAIN (5)

## 2018-03-13 NOTE — MR AVS SNAPSHOT
After Visit Summary   3/13/2018    Nancy Bejarano    MRN: [id number]           Patient Information     Date Of Birth          1960        Visit Information        Provider Department      3/13/2018 9:20 AM Brie Calvillo PA-C [facility]        Today's Diagnoses     Upper back strain, initial encounter    -  1    Hypertension goal BP (blood pressure) < 140/90          Care Instructions    Robaxin 1 tablet three times a day as needed for muscle aches    Start doing daily stretches and exercises     Aerobic Exercise for a Healthy Heart  Exercise is a lot more than an energy booster and a stress reliever. It also strengthens your heart muscle, lowers your blood pressure and cholesterol, and burns calories. It can also improve your resting muscle tone, and your mood.     Remember, some activity is better than none.    Choose an aerobic activity  Choose an activity that makes your heart and lungs work harder than they do when you rest or walk normally. This aerobic exercise can improve the way your heart and other muscles use oxygen. Make it fun by exercising with a friend and choosing an activity you enjoy. Here are some ideas:    Walking    Swimming    Bicycling    Stair climbing    Dancing    Jogging    Gardening  Exercise regularly  If you haven t been exercising regularly,  get your doctor s OK first. Then start slowly.  Here are some tips:    Begin exercising 3 times a week for 5 to 10 minutes at a time.    When you feel comfortable, add a few minutes each session.    Slowly build up to exercising 3 to 4 times each week. Each session should last for 40 minutes, on average, and involve moderate- to vigorous-intensity physical activity.    If you have been given nitroglycerin, be sure to carry it when you exercise.    If you get chest pain (angina) when you re exercising, stop what you re doing, take your nitroglycerin, and call your doctor.  Date Last Reviewed:  "2016-2017 [employer]. [address] [place] [address], [place] [address]. All rights reserved. This information is not intended as a substitute for professional medical care. Always follow your healthcare professional's instructions.                Follow-ups after your visit        Who to contact     If you have questions or need follow up information about today's clinic visit or your schedule please contact [facility] directly at [phone].  Normal or non-critical lab and imaging results will be communicated to you by MyChart, letter or phone within 4 business days after the clinic has received the results. If you do not hear from us within 7 days, please contact the clinic through MyChart or phone. If you have a critical or abnormal lab result, we will notify you by phone as soon as possible.  Submit refill requests through [employer] or call your pharmacy and they will forward the refill request to us. Please allow 3 business days for your refill to be completed.          Additional Information About Your Visit        [employer] Information     [employer] lets you send messages to your doctor, view your test results, renew your prescriptions, schedule appointments and more. To sign up, go to [email or web address] . Click on \"Log in\" on the left side of the screen, which will take you to the Welcome page. Then click on \"Sign up Now\" on the right side of the page.     You will be asked to enter the access code listed below, as well as some personal information. Please follow the directions to create your username and password.     Your access code is: [email or web address]  Expires: 2018  1:49 PM     Your access code will  in 90 days. If you need help or a new code, please call your [facility] or [phone].        Care EveryWhere ID     This is your Care EveryWhere ID. This could be used by other organizations to access your [facility] medical records  [id number]      " "  Your Vitals Were     Pulse Temperature Height Pulse Oximetry BMI (Body Mass Index)       90 98.2  F (36.8  C) (Oral) 5' 3\" (1.6 m) 100% 29.23 kg/m2        Blood Pressure from Last 3 Encounters:   03/13/18 130/80   02/17/18 144/82   07/05/17 144/82    Weight from Last 3 Encounters:   03/13/18 165 lb (74.8 kg)   02/17/18 167 lb 12.8 oz (76.1 kg)   07/05/17 163 lb (73.9 kg)              Today, you had the following     No orders found for display         Today's Medication Changes          These changes are accurate as of 3/13/18  9:50 AM.  If you have any questions, ask your nurse or doctor.               Start taking these medicines.        Dose/Directions    methocarbamol 750 MG tablet   Commonly known as:  ROBAXIN   Used for:  Upper back strain, initial encounter   Started by:  Brie Calvillo PA-C        Dose:  750 mg   Take 1 tablet (750 mg) by mouth 3 times daily as needed for muscle spasms   Quantity:  60 tablet   Refills:  3         Stop taking these medicines if you haven't already. Please contact your care team if you have questions.     cyclobenzaprine 10 MG tablet   Commonly known as:  FLEXERIL   Stopped by:  Brie Calvillo PA-C                Where to get your medicines      These medications were sent to [facility]/pharmacy #[id number] - [place], [place] - [address]  [address] [address]     Phone:  [phone]     atenolol 50 MG tablet    methocarbamol 750 MG tablet                Primary Care Provider Office Phone # Fax #    SHANE Orantes -[phone]       [facility] [address]  [facility] [address]        Equal Access to Services     [facility] AH: Oneida krause [employer], waaxda luqadaha, qaybta kaalmada adeegyada, zay dunn. So [facility] [phone].    ATENCIÓN: Si habla español, tiene a salas disposición servicios gratuitos de asistencia lingüística. Llame al " [phone].    We comply with applicable federal civil rights laws and [place] laws. We do not discriminate on the basis of race, color, national origin, age, disability, sex, sexual orientation, or gender identity.            Thank you!     Thank you for choosing [facility]  for your care. Our goal is always to provide you with excellent care. Hearing back from our patients is one way we can continue to improve our services. Please take a few minutes to complete the written survey that you may receive in the mail after your visit with us. Thank you!             Your Updated Medication List - Protect others around you: Learn how to safely use, store and throw away your medicines at www.disposemymeds.org.          This list is accurate as of 3/13/18  9:50 AM.  Always use your most recent med list.                   Brand Name Dispense Instructions for use Diagnosis    atenolol 50 MG tablet    TENORMIN    90 tablet    Take 1 tablet (50 mg) by mouth daily    Hypertension goal BP (blood pressure) < 140/90       methocarbamol 750 MG tablet    ROBAXIN    60 tablet    Take 1 tablet (750 mg) by mouth 3 times daily as needed for muscle spasms    Upper back strain, initial encounter       order for DME     1 Device    Equipment being ordered: [id number] $144, . Knee, Hinged, Lg blk neoprene    Left knee pain

## 2018-03-13 NOTE — PROGRESS NOTES
SUBJECTIVE:   Nancy Bejarano is a 58 year old female who presents to clinic today for the following health issues:      Hypertension Follow-up      Outpatient blood pressures are being checked at home, results are 110's-130/70's .     Low Salt Diet: not monitoring salt      Amount of exercise or physical activity: 2-3 days/week for an average of less than 15 minutes    Problems taking medications regularly: No    Medication side effects: none    Diet: regular (no restrictions)      Joint Pain    Onset: few weeks    Description: patient experiences generalize body aches and entire back muscle tightness in the morning when she wakes up. After moving for a while the symptoms resolve.   Location: all over body- back/shoulder/hips/legs/arms  Character: ache/sore    Intensity: moderate    Progression of Symptoms: same, intermittent    Accompanying Signs & Symptoms:  Other symptoms: none    History:   Previous similar pain: YES      Precipitating factors:   Trauma or overuse: no     Alleviating factors:  Improved by: nothing    Therapies Tried and outcome: ibuprofen and tylenol- mild relief.            Problem list and histories reviewed & adjusted, as indicated.  Additional history: as documented    Patient Active Problem List   Diagnosis     Hypertension goal BP (blood pressure) < 140/90     Hyperlipidemia LDL goal <130     Back pain     GERD (gastroesophageal reflux disease)     Abdominal pain, unspecified abdominal location     Cataracts, both eyes     Posterior vitreous detachment of both eyes     Left knee pain, unspecified chronicity     Overweight (BMI 25.0-29.9)     History reviewed. No pertinent surgical history.    Social History   Substance Use Topics     Smoking status: Never Smoker     Smokeless tobacco: Never Used      Comment: lives in smoke free household.     Alcohol use No     Family History   Problem Relation Age of Onset     DIABETES No family hx of      Hypertension No family hx of      Breast Cancer  "No family hx of      Cancer - colorectal No family hx of      CANCER No family hx of      CEREBROVASCULAR DISEASE No family hx of      Thyroid Disease No family hx of      Glaucoma No family hx of      Macular Degeneration No family hx of          Current Outpatient Prescriptions   Medication Sig Dispense Refill     atenolol (TENORMIN) 50 MG tablet Take 1 tablet (50 mg) by mouth daily 90 tablet 1     methocarbamol (ROBAXIN) 750 MG tablet Take 1 tablet (750 mg) by mouth 3 times daily as needed for muscle spasms 60 tablet 3     [DISCONTINUED] atenolol (TENORMIN) 50 MG tablet Take 1 tablet (50 mg) by mouth daily (Patient not taking: Reported on 3/13/2018) 90 tablet 1     order for DME Equipment being ordered: [id number] $144, . Knee, Hinged, Lg blk neoprene (Patient not taking: Reported on 3/13/2018) 1 Device 0     Allergies   Allergen Reactions     Asa [Dihydroxyaluminum Aminoacetate] Other (See Comments)     Epigastric   pain       Reviewed and updated as needed this visit by clinical staff  Tobacco  Allergies  Meds  Med Hx  Surg Hx  Fam Hx  Soc Hx      Reviewed and updated as needed this visit by Provider         ROS:  Constitutional, HEENT, cardiovascular, pulmonary, GI, , musculoskeletal, neuro, skin, endocrine and psych systems are negative, except as otherwise noted.    OBJECTIVE:     /80  Pulse 90  Temp 98.2  F (36.8  C) (Oral)  Ht 5' 3\" (1.6 m)  Wt 165 lb (74.8 kg)  SpO2 100%  BMI 29.23 kg/m2  Body mass index is 29.23 kg/(m^2).  GENERAL: healthy, alert and no distress  NECK: no adenopathy, no asymmetry, masses, or scars and thyroid normal to palpation  RESP: lungs clear to auscultation - no rales, rhonchi or wheezes  CV: regular rate and rhythm, normal S1 S2, no S3 or S4, no murmur, click or rub, no peripheral edema and peripheral pulses strong  ABDOMEN: soft, nontender, no hepatosplenomegaly, no masses and bowel sounds normal  MS: no gross musculoskeletal defects noted, no " edema  MS: normal muscle tone, no cyanosis, clubbing, or edema, gait normal, no ataxia, neck exam shows normal strength, no torticollis and ROM is normal and spine exam shows ROM is normal and straight  BACK: no CVA tenderness, no paralumbar tenderness    Diagnostic Test Results:  Results for orders placed or performed in visit on 02/17/18   CBC with platelets differential   Result Value Ref Range    WBC 4.4 4.0 - 11.0 10e9/L    RBC Count 4.93 3.8 - 5.2 10e12/L    Hemoglobin 12.2 11.7 - 15.7 g/dL    Hematocrit 38.2 35.0 - 47.0 %    MCV 78 78 - 100 fl    MCH 24.7 (L) 26.5 - 33.0 pg    MCHC 31.9 31.5 - 36.5 g/dL    RDW 15.4 (H) 10.0 - 15.0 %    Platelet Count 244 150 - 450 10e9/L    Diff Method Automated Method     % Neutrophils 44.0 %    % Lymphocytes 43.0 %    % Monocytes 10.1 %    % Eosinophils 2.7 %    % Basophils 0.2 %    Absolute Neutrophil 2.0 1.6 - 8.3 10e9/L    Absolute Lymphocytes 1.9 0.8 - 5.3 10e9/L    Absolute Monocytes 0.5 0.0 - 1.3 10e9/L    Absolute Eosinophils 0.1 0.0 - 0.7 10e9/L    Absolute Basophils 0.0 0.0 - 0.2 10e9/L   ESR: Erythrocyte sedimentation rate   Result Value Ref Range    Sed Rate 11 0 - 30 mm/h   CRP inflammation   Result Value Ref Range    CRP Inflammation 3.2 0.0 - 8.0 mg/L   Rheumatoid factor   Result Value Ref Range    Rheumatoid Factor <20 <20 IU/mL   Anti Nuclear [place] IgG by IFA with Reflex   Result Value Ref Range    NELLY interpretation Negative NEG^Negative   Lyme Disease [place] with reflex to WB Serum   Result Value Ref Range    Lyme Disease Antibodies Serum 0.04 0.00 - 0.89   Basic metabolic panel  (Ca, Cl, CO2, Creat, Gluc, K, Na, BUN)   Result Value Ref Range    Sodium 138 133 - 144 mmol/L    Potassium 3.9 3.4 - 5.3 mmol/L    Chloride 106 94 - 109 mmol/L    Carbon Dioxide 27 20 - 32 mmol/L    Anion Gap 5 3 - 14 mmol/L    Glucose 94 70 - 99 mg/dL    Urea Nitrogen 13 7 - 30 mg/dL    Creatinine 0.82 0.52 - 1.04 mg/dL    GFR Estimate 72 >60 mL/min/1.7m2    GFR Estimate If Black 87  >60 mL/min/1.7m2    Calcium 8.9 8.5 - 10.1 mg/dL       ASSESSMENT/PLAN:       ICD-10-CM    1. Upper back strain, initial encounter S29.012A methocarbamol (ROBAXIN) 750 MG tablet   2. Hypertension goal BP (blood pressure) < 140/90 I10 atenolol (TENORMIN) 50 MG tablet   1. Had a lengthy discussion about the need to stretch daily and exercise 2-3 times a day to maintain active life style. This will helps with generalized muscle aches and general mental and physical state.   Robaxin 1 tablet three times a day as needed for muscle aches  Start doing daily stretches and exercises     2. Stable   Continue Atenolol 50 mg qd    Brie Calvillo PA-C  [facility]

## 2018-03-13 NOTE — PATIENT INSTRUCTIONS
Robaxin 1 tablet three times a day as needed for muscle aches    Start doing daily stretches and exercises     Aerobic Exercise for a Healthy Heart  Exercise is a lot more than an energy booster and a stress reliever. It also strengthens your heart muscle, lowers your blood pressure and cholesterol, and burns calories. It can also improve your resting muscle tone, and your mood.     Remember, some activity is better than none.    Choose an aerobic activity  Choose an activity that makes your heart and lungs work harder than they do when you rest or walk normally. This aerobic exercise can improve the way your heart and other muscles use oxygen. Make it fun by exercising with a friend and choosing an activity you enjoy. Here are some ideas:    Walking    Swimming    Bicycling    Stair climbing    Dancing    Jogging    Gardening  Exercise regularly  If you haven t been exercising regularly,  get your doctor s OK first. Then start slowly.  Here are some tips:    Begin exercising 3 times a week for 5 to 10 minutes at a time.    When you feel comfortable, add a few minutes each session.    Slowly build up to exercising 3 to 4 times each week. Each session should last for 40 minutes, on average, and involve moderate- to vigorous-intensity physical activity.    If you have been given nitroglycerin, be sure to carry it when you exercise.    If you get chest pain (angina) when you re exercising, stop what you re doing, take your nitroglycerin, and call your doctor.  Date Last Reviewed: 6/2/2016 2000-2017 [employer]. [address], [place] [address]. All rights reserved. This information is not intended as a substitute for professional medical care. Always follow your healthcare professional's instructions.

## 2018-09-07 ENCOUNTER — TELEPHONE (OUTPATIENT)
Dept: FAMILY MEDICINE | Facility: CLINIC | Age: 58
End: 2018-09-07

## 2018-09-07 NOTE — TELEPHONE ENCOUNTER
9/7/2018    Call Regarding VIP Mammogram    Attempt 1    Message on voicemail    Patient is also due for: Preventive Health Screening Colonoscopy/FIT    Outreach   SV

## 2019-01-25 ENCOUNTER — ANCILLARY PROCEDURE (OUTPATIENT)
Dept: GENERAL RADIOLOGY | Facility: CLINIC | Age: 59
End: 2019-01-25
Payer: COMMERCIAL

## 2019-01-25 ENCOUNTER — OFFICE VISIT (OUTPATIENT)
Dept: FAMILY MEDICINE | Facility: CLINIC | Age: 59
End: 2019-01-25
Payer: COMMERCIAL

## 2019-01-25 VITALS
WEIGHT: 167 LBS | SYSTOLIC BLOOD PRESSURE: 150 MMHG | HEART RATE: 83 BPM | TEMPERATURE: 97.7 F | BODY MASS INDEX: 29.59 KG/M2 | OXYGEN SATURATION: 99 % | HEIGHT: 63 IN | DIASTOLIC BLOOD PRESSURE: 90 MMHG

## 2019-01-25 DIAGNOSIS — M25.562 ARTHRALGIA OF BOTH KNEES: ICD-10-CM

## 2019-01-25 DIAGNOSIS — M25.561 ARTHRALGIA OF BOTH KNEES: ICD-10-CM

## 2019-01-25 DIAGNOSIS — S43.402A SPRAIN OF LEFT SHOULDER, UNSPECIFIED SHOULDER SPRAIN TYPE, INITIAL ENCOUNTER: ICD-10-CM

## 2019-01-25 DIAGNOSIS — M62.838 TRAPEZIUS MUSCLE SPASM: ICD-10-CM
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
## 2019-01-25 PROCEDURE — 73560 X-RAY EXAM OF KNEE 1 OR 2: CPT | Mod: 59

## 2019-01-25 PROCEDURE — 73030 X-RAY EXAM OF SHOULDER: CPT | Mod: LT

## 2019-01-25 PROCEDURE — 99214 OFFICE O/P EST MOD 30 MIN: CPT | Performed by: PREVENTIVE MEDICINE

## 2019-01-25 RX ORDER — CYCLOBENZAPRINE HCL 5 MG
5 TABLET ORAL
Qty: 20 TABLET | Refills: 0 | Status: SHIPPED | OUTPATIENT
Start: 2019-01-25 | End: 2019-06-14

## 2019-01-25 RX ORDER — IBUPROFEN 600 MG/1
600 TABLET, FILM COATED ORAL EVERY 8 HOURS PRN
Qty: 30 TABLET | Refills: 0 | Status: SHIPPED | OUTPATIENT
Start: 2019-01-25 | End: 2019-06-14

## 2019-01-25 ASSESSMENT — MIFFLIN-ST. JEOR: SCORE: 1306.64

## 2019-01-25 ASSESSMENT — PAIN SCALES - GENERAL: PAINLEVEL: EXTREME PAIN (8)

## 2019-01-25 NOTE — RESULT ENCOUNTER NOTE
Please send a letter:    Dear Nancy Bejarano,    X rays of the knees did not show any bony abnormalities.  Please let me know if you have any questions and thank you for choosing [facility].    Regards,    Emerita Goldberg MD MPH
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
## 2019-01-25 NOTE — RESULT ENCOUNTER NOTE
Please send a letter:    Dear Nancy Bejarano,    X rays of the shoulder did not show any bony abnormalities. Plan of care and follow up as discussed in clinic.  Please let me know if you have any questions and thank you for choosing [place].    Regards,    Emerita Goldberg MD MPH

## 2019-01-25 NOTE — PATIENT INSTRUCTIONS
At [facility], we strive to deliver an exceptional experience to you, every time we see you.  If you receive a survey in the mail, please send us back your thoughts. We really do value your feedback.    Your care team:                            Family Medicine Internal Medicine   MD Ross Maxwell MD Shantel Branch-Fleming, MD Katya Georgiev PA-C Megan Hill, APRN SAUL James MD Pediatrics   Harsh Rosales, EDITA Jones, MD Molly Castellano APRN CNP   MD Nimo Shannon MD Deborah Mielke, MD Namita Caldwell, APRN [facility]      Clinic hours: Monday - Thursday 7 am-7 pm; Fridays 7 am-5 pm.   Urgent care: Monday - Friday 11 am-9 pm; Saturday and Sunday 9 am-5 pm.  Pharmacy : Monday -Thursday 8 am-8 pm; Friday 8 am-6 pm; Saturday and Sunday 9 am-5 pm.     Clinic: [phone]   Pharmacy: [phone]

## 2019-04-22 NOTE — TELEPHONE ENCOUNTER
4/22/2019    Call Regarding Preventive Health Screening Mammogram       Attempt 2    Message on voicemail    Comments: VIP BK 5/14      Outreach   AMILCAR

## 2019-05-10 DIAGNOSIS — I10 HYPERTENSION GOAL BP (BLOOD PRESSURE) < 140/90: ICD-10-CM

## 2019-05-10 NOTE — TELEPHONE ENCOUNTER
"Requested Prescriptions   Pending Prescriptions Disp Refills     atenolol (TENORMIN) 50 MG tablet [Pharmacy Med Name: ATENOLOL 50 MG TABLET]      Last Written Prescription Date:  1/30/19  Last Fill Quantity: 90,  # refills: 0   Last Office Visit with G, P or [facility] prescribing provider:  1/25/19   Future Office Visit:      90 tablet 0     Sig: TAKE 1 TABLET BY MOUTH EVERY DAY       Beta-Blockers Protocol Failed - 5/10/2019  1:19 AM        Failed - Blood pressure under 140/90 in past 12 months     BP Readings from Last 3 Encounters:   01/25/19 150/90   03/13/18 130/80   02/17/18 144/82                 Passed - Patient is age 6 or older        Passed - Recent (12 mo) or future (30 days) visit within the authorizing provider's specialty     Patient had office visit in the last 12 months or has a visit in the next 30 days with authorizing provider or within the authorizing provider's specialty.  See \"Patient Info\" tab in inbasket, or \"Choose Columns\" in Meds & Orders section of the refill encounter.              Passed - Medication is active on med list              Gerald Faarax  [facility]  "

## 2019-05-10 NOTE — LETTER
Nancy Bejarano  [address] [address]          05/22/19      Dear Nancy Bejarano        At [facility] we care about your health and are committed to providing quality patient care. Regular appointments are a vital part of the care and management of your health and can help prevent many of the complications that can occur.      It has come to our attention that you are due for an office visit. Please call [facility] at [phone] soon to schedule your appointment.    If you have transferred care to another clinic please call to inform us so that we do not continue to send you reminder letters.      Sincerely,      [facility] Care Team

## 2019-05-13 NOTE — TELEPHONE ENCOUNTER
Routing refill request to provider for review/approval because:  A break in medication  Mariya Galeana RN

## 2019-05-21 ENCOUNTER — TELEPHONE (OUTPATIENT)
Dept: FAMILY MEDICINE | Facility: CLINIC | Age: 59
End: 2019-05-21

## 2019-05-21 NOTE — TELEPHONE ENCOUNTER
Patient still waiting a response from the encounter of 5/13/19. Routing refill request to provider for review/approval because:  A break in medication  Mariya Rogers  [facility]

## 2019-05-22 RX ORDER — ATENOLOL 50 MG/1
TABLET ORAL
Qty: 30 TABLET | Refills: 0 | Status: SHIPPED | OUTPATIENT
Start: 2019-05-22 | End: 2019-06-14

## 2019-05-22 NOTE — TELEPHONE ENCOUNTER
Faxed Rx for the Tenormin to [facility], [place], [phone], right fax confirmed at 9:54 am today, 5/22/19. Called and left a voicemail message regarding refill and an appt is due and sent letter.  Sadaf Trejo MA/  [employer] [facility]

## 2019-05-22 NOTE — TELEPHONE ENCOUNTER
Duplicate request. Filled prescription for 20 days only- due for appt for further refills.  Yakelin LYNN, CNP

## 2019-05-25 DIAGNOSIS — I10 HYPERTENSION GOAL BP (BLOOD PRESSURE) < 140/90: ICD-10-CM

## 2019-05-25 NOTE — TELEPHONE ENCOUNTER
"Requested Prescriptions   Pending Prescriptions Disp Refills     atenolol (TENORMIN) 50 MG tablet [Pharmacy Med Name: ATENOLOL 50 MG TABLET]        Last Written Prescription Date:  5/22/19  Last Fill Quantity: 30,  # refills: 0   Last Office Visit with G, P or [facility] prescribing provider:  1/25/19   Future Office Visit:      90 tablet 0     Sig: TAKE 1 TABLET BY MOUTH EVERY DAY       Beta-Blockers Protocol Failed - 5/25/2019 11:40 AM        Failed - Blood pressure under 140/90 in past 12 months     BP Readings from Last 3 Encounters:   01/25/19 150/90   03/13/18 130/80   02/17/18 144/82                 Passed - Patient is age 6 or older        Passed - Recent (12 mo) or future (30 days) visit within the authorizing provider's specialty     Patient had office visit in the last 12 months or has a visit in the next 30 days with authorizing provider or within the authorizing provider's specialty.  See \"Patient Info\" tab in inbasket, or \"Choose Columns\" in Meds & Orders section of the refill encounter.              Passed - Medication is active on med list            Gerald Faarax  [facility]    "

## 2019-05-28 RX ORDER — ATENOLOL 50 MG/1
TABLET ORAL
Qty: 90 TABLET | Refills: 0 | OUTPATIENT
Start: 2019-05-28

## 2019-06-14 ENCOUNTER — ANCILLARY PROCEDURE (OUTPATIENT)
Dept: GENERAL RADIOLOGY | Facility: CLINIC | Age: 59
End: 2019-06-14
Attending: PREVENTIVE MEDICINE
Payer: OTHER MISCELLANEOUS

## 2019-06-14 ENCOUNTER — OFFICE VISIT (OUTPATIENT)
Dept: FAMILY MEDICINE | Facility: CLINIC | Age: 59
End: 2019-06-14
Payer: OTHER MISCELLANEOUS

## 2019-06-14 VITALS
OXYGEN SATURATION: 98 % | WEIGHT: 167 LBS | DIASTOLIC BLOOD PRESSURE: 85 MMHG | RESPIRATION RATE: 18 BRPM | SYSTOLIC BLOOD PRESSURE: 145 MMHG | BODY MASS INDEX: 29.59 KG/M2 | HEART RATE: 91 BPM | TEMPERATURE: 97.9 F | HEIGHT: 63 IN

## 2019-06-14 DIAGNOSIS — M25.551 BILATERAL HIP PAIN: ICD-10-CM

## 2019-06-14 DIAGNOSIS — M79.18 DIFFUSE MYOFASCIAL PAIN SYNDROME: ICD-10-CM

## 2019-06-14 DIAGNOSIS — M25.50 POLYARTHRALGIA: Primary | ICD-10-CM

## 2019-06-14 DIAGNOSIS — Z12.11 SPECIAL SCREENING FOR MALIGNANT NEOPLASMS, COLON: ICD-10-CM

## 2019-06-14 DIAGNOSIS — Z12.31 ENCOUNTER FOR SCREENING MAMMOGRAM FOR BREAST CANCER: ICD-10-CM

## 2019-06-14 DIAGNOSIS — M25.552 BILATERAL HIP PAIN: ICD-10-CM

## 2019-06-14 DIAGNOSIS — I10 HYPERTENSION GOAL BP (BLOOD PRESSURE) < 140/90: ICD-10-CM

## 2019-06-14 LAB
ANION GAP SERPL CALCULATED.3IONS-SCNC: 6 MMOL/L (ref 3–14)
BUN SERPL-MCNC: 13 MG/DL (ref 7–30)
CALCIUM SERPL-MCNC: 9.1 MG/DL (ref 8.5–10.1)
CHLORIDE SERPL-SCNC: 105 MMOL/L (ref 94–109)
CO2 SERPL-SCNC: 28 MMOL/L (ref 20–32)
CREAT SERPL-MCNC: 0.8 MG/DL (ref 0.52–1.04)
CREAT UR-MCNC: 44 MG/DL
GFR SERPL CREATININE-BSD FRML MDRD: 81 ML/MIN/{1.73_M2}
GLUCOSE SERPL-MCNC: 94 MG/DL (ref 70–99)
LDLC SERPL DIRECT ASSAY-MCNC: 133 MG/DL
MICROALBUMIN UR-MCNC: <5 MG/L
MICROALBUMIN/CREAT UR: NORMAL MG/G CR (ref 0–25)
POTASSIUM SERPL-SCNC: 3.9 MMOL/L (ref 3.4–5.3)
SODIUM SERPL-SCNC: 139 MMOL/L (ref 133–144)

## 2019-06-14 PROCEDURE — 80048 BASIC METABOLIC PNL TOTAL CA: CPT | Performed by: PREVENTIVE MEDICINE

## 2019-06-14 PROCEDURE — 99214 OFFICE O/P EST MOD 30 MIN: CPT | Performed by: PREVENTIVE MEDICINE

## 2019-06-14 PROCEDURE — 83721 ASSAY OF BLOOD LIPOPROTEIN: CPT | Performed by: PREVENTIVE MEDICINE

## 2019-06-14 PROCEDURE — 72170 X-RAY EXAM OF PELVIS: CPT

## 2019-06-14 PROCEDURE — 82043 UR ALBUMIN QUANTITATIVE: CPT | Performed by: PREVENTIVE MEDICINE

## 2019-06-14 PROCEDURE — 36415 COLL VENOUS BLD VENIPUNCTURE: CPT | Performed by: PREVENTIVE MEDICINE

## 2019-06-14 RX ORDER — METHOCARBAMOL 750 MG/1
750 TABLET, FILM COATED ORAL 3 TIMES DAILY PRN
Qty: 60 TABLET | Refills: 3 | Status: SHIPPED | OUTPATIENT
Start: 2019-06-14 | End: 2019-12-04

## 2019-06-14 RX ORDER — AMLODIPINE BESYLATE 5 MG/1
5 TABLET ORAL DAILY
Qty: 90 TABLET | Refills: 0 | Status: SHIPPED | OUTPATIENT
Start: 2019-06-14 | End: 2019-09-13

## 2019-06-14 RX ORDER — MELOXICAM 7.5 MG/1
7.5 TABLET ORAL DAILY
Qty: 90 TABLET | Refills: 1 | Status: SHIPPED | OUTPATIENT
Start: 2019-06-14 | End: 2019-12-04

## 2019-06-14 RX ORDER — ATENOLOL 50 MG/1
50 TABLET ORAL DAILY
Qty: 90 TABLET | Refills: 1 | Status: SHIPPED | OUTPATIENT
Start: 2019-06-14 | End: 2019-12-04

## 2019-06-14 ASSESSMENT — MIFFLIN-ST. JEOR: SCORE: 1301.64

## 2019-06-14 ASSESSMENT — PATIENT HEALTH QUESTIONNAIRE - PHQ9: SUM OF ALL RESPONSES TO PHQ QUESTIONS 1-9: 6

## 2019-06-14 ASSESSMENT — PAIN SCALES - GENERAL: PAINLEVEL: MILD PAIN (3)

## 2019-06-14 NOTE — RESULT ENCOUNTER NOTE
Please send a letter:    Dear Nancy Bejarano,    X rays of the hips did not show any arthritis or other concerning bony changes.  Please let me know if you have any questions and thank you for choosing [place].    Regards,    Emerita Goldberg MD MPH

## 2019-06-14 NOTE — RESULT ENCOUNTER NOTE
Please send a letter:    Dear Nancy KEMAR Carlee,    LDL cholesterol is almost at goal. Weight loss and 150 minutes of moderate physical activity will help with this.  Urine sample did not show any abnormal protein.  Electrolytes, glucose and kidney function is normal.  Plan of care and follow up as discussed in clinic.   Please let me know if you have any questions and thank you for choosing [place].    Regards,    Emerita Goldberg MD MPH

## 2019-06-19 DIAGNOSIS — Z12.11 SPECIAL SCREENING FOR MALIGNANT NEOPLASMS, COLON: ICD-10-CM

## 2019-06-19 LAB — HEMOCCULT STL QL IA: NEGATIVE

## 2019-06-19 PROCEDURE — 82274 ASSAY TEST FOR BLOOD FECAL: CPT | Performed by: PREVENTIVE MEDICINE

## 2019-06-19 NOTE — RESULT ENCOUNTER NOTE
Please send a letter:    Dear Nancy Bejarano,    Stool test did not show any blood. Plan to check once a year as part of colon cancer screening.  Please let me know if you have any questions and thank you for choosing [place].    Regards,    Emerita Goldberg MD MPH

## 2019-06-25 ENCOUNTER — TELEPHONE (OUTPATIENT)
Dept: FAMILY MEDICINE | Facility: CLINIC | Age: 59
End: 2019-06-25

## 2019-06-25 NOTE — TELEPHONE ENCOUNTER
Will assess appropriateness of forms once available. I will be on CME time off starting tomorrow till July 2nd.    Thank you.    Emerita Goldberg MD MPH

## 2019-06-25 NOTE — TELEPHONE ENCOUNTER
I don't see forms were given to provider - routing to Dr. Goldberg for review. Saw patient on 6/14/19    Sly Ballard MA

## 2019-06-25 NOTE — TELEPHONE ENCOUNTER
I was not provided with any forms during the visit. They did not even mention to the  that this was Work comp and was signed in for a routine visit, which is what I did.    Thank you.  Emerita Goldberg MD MPH

## 2019-06-25 NOTE — TELEPHONE ENCOUNTER
Called patient - she will drop forms off tomorrow to be completed by Dr. Goldberg. Gave pt information for [employer] billing so OV can be changed to work comp.    Sly Ballard MA

## 2019-06-25 NOTE — TELEPHONE ENCOUNTER
Reason for Call:  work comp injury forms    Detailed comments: checking on forms for work comp to be sent to [employer] [place].   Has further inquiries.   Phone Number Patient can be reached at: Home number on file [phone] (home)  Or cell [phone]  Best Time: any    Can we leave a detailed message on this number? YES    Call taken on 6/25/2019 at 9:47 AM by Kelli Milan

## 2019-09-13 DIAGNOSIS — I10 HYPERTENSION GOAL BP (BLOOD PRESSURE) < 140/90: ICD-10-CM

## 2019-09-13 NOTE — TELEPHONE ENCOUNTER
"Requested Prescriptions   Pending Prescriptions Disp Refills     amLODIPine (NORVASC) 5 MG tablet [Pharmacy Med Name: AMLODIPINE BESYLATE 5 MG TAB]  Last Written Prescription Date:  06/14/19  Last Fill Quantity: 90,  # refills: 0   Last Office Visit with LEONEL, POLI or [facility] prescribing provider:  06/14/19Luis   Future Office Visit:    30 tablet 2     Sig: TAKE 1 TABLET BY MOUTH EVERY DAY       Calcium Channel Blockers Protocol  Failed - 9/13/2019  2:03 AM        Failed - Blood pressure under 140/90 in past 12 months     BP Readings from Last 3 Encounters:   06/14/19 145/85   01/25/19 150/90   03/13/18 130/80                 Passed - Recent (12 mo) or future (30 days) visit within the authorizing provider's specialty     Patient had office visit in the last 12 months or has a visit in the next 30 days with authorizing provider or within the authorizing provider's specialty.  See \"Patient Info\" tab in inbasket, or \"Choose Columns\" in Meds & Orders section of the refill encounter.              Passed - Medication is active on med list        Passed - Patient is age 18 or older        Passed - No active pregnancy on record        Passed - Normal serum creatinine on file in past 12 months     Recent Labs   Lab Test 06/14/19  0847   CR 0.80             Passed - No positive pregnancy test in past 12 months          "

## 2019-09-16 RX ORDER — AMLODIPINE BESYLATE 5 MG/1
TABLET ORAL
Qty: 30 TABLET | Refills: 2 | Status: SHIPPED | OUTPATIENT
Start: 2019-09-16 | End: 2019-12-04

## 2019-09-16 NOTE — TELEPHONE ENCOUNTER
Prescription approved per [facility] Refill Protocol.    Mariya Galeana RN

## 2019-12-04 ENCOUNTER — OFFICE VISIT (OUTPATIENT)
Dept: FAMILY MEDICINE | Facility: CLINIC | Age: 59
End: 2019-12-04
Payer: COMMERCIAL

## 2019-12-04 VITALS
DIASTOLIC BLOOD PRESSURE: 86 MMHG | OXYGEN SATURATION: 99 % | RESPIRATION RATE: 20 BRPM | WEIGHT: 163 LBS | BODY MASS INDEX: 28.88 KG/M2 | SYSTOLIC BLOOD PRESSURE: 156 MMHG | HEART RATE: 90 BPM | HEIGHT: 63 IN | TEMPERATURE: 97.6 F

## 2019-12-04 DIAGNOSIS — Z13.0 SCREENING FOR DEFICIENCY ANEMIA: ICD-10-CM

## 2019-12-04 DIAGNOSIS — J06.9 VIRAL UPPER RESPIRATORY TRACT INFECTION: ICD-10-CM

## 2019-12-04 DIAGNOSIS — M79.18 DIFFUSE MYOFASCIAL PAIN SYNDROME: Primary | ICD-10-CM

## 2019-12-04 DIAGNOSIS — I10 HYPERTENSION GOAL BP (BLOOD PRESSURE) < 140/90: ICD-10-CM

## 2019-12-04 DIAGNOSIS — M25.562 CHRONIC PAIN OF BOTH KNEES: ICD-10-CM

## 2019-12-04 DIAGNOSIS — M25.561 CHRONIC PAIN OF BOTH KNEES: ICD-10-CM

## 2019-12-04 DIAGNOSIS — G89.29 CHRONIC PAIN OF BOTH KNEES: ICD-10-CM

## 2019-12-04 PROCEDURE — 99214 OFFICE O/P EST MOD 30 MIN: CPT | Performed by: NURSE PRACTITIONER

## 2019-12-04 RX ORDER — BENZONATATE 200 MG/1
200 CAPSULE ORAL 3 TIMES DAILY PRN
Qty: 21 CAPSULE | Refills: 0 | Status: SHIPPED | OUTPATIENT
Start: 2019-12-04 | End: 2022-06-27

## 2019-12-04 RX ORDER — MELOXICAM 7.5 MG/1
7.5 TABLET ORAL DAILY
Qty: 90 TABLET | Refills: 1 | Status: SHIPPED | OUTPATIENT
Start: 2019-12-04 | End: 2020-01-22

## 2019-12-04 RX ORDER — AMLODIPINE BESYLATE 5 MG/1
5 TABLET ORAL DAILY
Qty: 90 TABLET | Refills: 1 | Status: SHIPPED | OUTPATIENT
Start: 2019-12-04 | End: 2020-01-22

## 2019-12-04 RX ORDER — ATENOLOL 50 MG/1
50 TABLET ORAL DAILY
Qty: 90 TABLET | Refills: 1 | Status: SHIPPED | OUTPATIENT
Start: 2019-12-04 | End: 2020-01-22

## 2019-12-04 RX ORDER — METHOCARBAMOL 750 MG/1
750 TABLET, FILM COATED ORAL 3 TIMES DAILY PRN
Qty: 60 TABLET | Refills: 3 | Status: SHIPPED | OUTPATIENT
Start: 2019-12-04 | End: 2022-06-27

## 2019-12-04 ASSESSMENT — PAIN SCALES - GENERAL: PAINLEVEL: NO PAIN (1)

## 2019-12-04 ASSESSMENT — MIFFLIN-ST. JEOR: SCORE: 1283.49

## 2019-12-04 NOTE — PATIENT INSTRUCTIONS
Patient Education     Knee Pain  Knee pain is very common. It s especially common in active people who put a lot of pressure on their knees, like runners. It affects women more often than men.  Your kneecap (patella) is a thick, round bone. It covers and protects the front portion of your knee joint. It moves along a groove in your thighbone (femur) as part of the patellofemoral joint. A layer of cartilage surrounds the underside of your kneecap. This layer protects it from grinding against your femur.  When this cartilage softens and breaks down, it can cause knee pain. This is partly because of repetitive stress. The stress irritates the lining of the joint. This causes pain in the underlying bone.  What causes knee pain?  Many things can cause knee pain. You may have more than one cause. Some of these include:    Overuse of the knee joint    The kneecap doesn t line up with the tissue around it    Damage to small nerves in the area    Damage to the ligament-like structure that holds the kneecap in place (retinaculum)    Breakdown of the bone under the cartilage    Swelling in the soft tissues around the kneecap    Injury  You might be more likely to have knee pain if you:    Exercise a lot    Recently increased the intensity of your workouts    Have a body mass index (BMI) greater than 25    Have poor alignment of your kneecap    Walk with your feet turned overly outward or inward    Have weakness in surrounding muscle groups (inner quad or hip adductor muscles)    Have too much tightness in surrounding muscle groups (hamstrings or iliotibial band)    Have a recent history of injury to the area    Are female  Symptoms of knee pain  This type of knee pain is a dull, aching pain in the front of the knee in the area under and around the kneecap. This pain may start quickly or slowly. Your pain might be worse when you squat, run, or sit for a long time. You might also sometimes feel like your knee is giving out.  You may have symptoms in one or both of your knees.  Diagnosing knee pain  Your healthcare provider will ask about your medical history and your symptoms. Be sure to describe any activities that make your knee pain worse. He or she will look at your knee. This will include tests of your range of motion, strength, and areas of pain of your knee. Your knee alignment will be checked.  Your healthcare provider will need to rule out other causes of your knee pain, such as arthritis. You may need an imaging test, such as an X-ray or MRI.  Treatment for knee pain  Treatments that can help ease your symptoms may include:    Avoiding activities for a while that make your pain worse, returning to activity over time    Icing the outside of your knee when it causes you pain    Taking over-the-counter pain medicine    Wearing a knee brace or taping your knee to support it    Wearing special shoe inserts to help keep your feet in the proper alignment    Doing special exercises to stretch and strengthen the muscles around your hip and your knee  These steps help most people manage knee pain. But some cases of knee pain need to be treated with surgery. You may need surgery right away. Or you may need it later if other treatments don t work. Your healthcare provider may refer you to an orthopedic surgeon. He or she will talk with you about your choices.  Preventing knee pain  Losing weight and correcting excess muscle tightness or muscle weakness may help lower your risk.  In some cases, you can prevent knee pain. To help prevent a flare-up of knee pain, you do these things:    Regularly do all the exercises your doctor or physical therapist advises    Support your knee as advised by your doctor or physical therapist    Increase training gradually, and ease up on training when needed    Have an expert check your gait for running or other sporting activities    Stretch properly before and after exercise    Replace your running shoes  regularly    Lose excess weight     When to call your healthcare provider  Call your healthcare provider right away if:    Your symptoms don t get better after a few weeks of treatment    You have any new symptoms   Date Last Reviewed: 4/1/2017 2000-2018 [employer]. [address] [place] [address], [place] [address]. All rights reserved. This information is not intended as a substitute for professional medical care. Always follow your healthcare professional's instructions.           Patient Education     Knee Pain  Knee pain is very common. It s especially common in active people who put a lot of pressure on their knees, like runners. It affects women more often than men.  Your kneecap (patella) is a thick, round bone. It covers and protects the front portion of your knee joint. It moves along a groove in your thighbone (femur) as part of the patellofemoral joint. A layer of cartilage surrounds the underside of your kneecap. This layer protects it from grinding against your femur.  When this cartilage softens and breaks down, it can cause knee pain. This is partly because of repetitive stress. The stress irritates the lining of the joint. This causes pain in the underlying bone.  What causes knee pain?  Many things can cause knee pain. You may have more than one cause. Some of these include:    Overuse of the knee joint    The kneecap doesn t line up with the tissue around it    Damage to small nerves in the area    Damage to the ligament-like structure that holds the kneecap in place (retinaculum)    Breakdown of the bone under the cartilage    Swelling in the soft tissues around the kneecap    Injury  You might be more likely to have knee pain if you:    Exercise a lot    Recently increased the intensity of your workouts    Have a body mass index (BMI) greater than 25    Have poor alignment of your kneecap    Walk with your feet turned overly outward or inward    Have weakness in surrounding muscle  groups (inner quad or hip adductor muscles)    Have too much tightness in surrounding muscle groups (hamstrings or iliotibial band)    Have a recent history of injury to the area    Are female  Symptoms of knee pain  This type of knee pain is a dull, aching pain in the front of the knee in the area under and around the kneecap. This pain may start quickly or slowly. Your pain might be worse when you squat, run, or sit for a long time. You might also sometimes feel like your knee is giving out. You may have symptoms in one or both of your knees.  Diagnosing knee pain  Your healthcare provider will ask about your medical history and your symptoms. Be sure to describe any activities that make your knee pain worse. He or she will look at your knee. This will include tests of your range of motion, strength, and areas of pain of your knee. Your knee alignment will be checked.  Your healthcare provider will need to rule out other causes of your knee pain, such as arthritis. You may need an imaging test, such as an X-ray or MRI.  Treatment for knee pain  Treatments that can help ease your symptoms may include:    Avoiding activities for a while that make your pain worse, returning to activity over time    Icing the outside of your knee when it causes you pain    Taking over-the-counter pain medicine    Wearing a knee brace or taping your knee to support it    Wearing special shoe inserts to help keep your feet in the proper alignment    Doing special exercises to stretch and strengthen the muscles around your hip and your knee  These steps help most people manage knee pain. But some cases of knee pain need to be treated with surgery. You may need surgery right away. Or you may need it later if other treatments don t work. Your healthcare provider may refer you to an orthopedic surgeon. He or she will talk with you about your choices.  Preventing knee pain  Losing weight and correcting excess muscle tightness or muscle  weakness may help lower your risk.  In some cases, you can prevent knee pain. To help prevent a flare-up of knee pain, you do these things:    Regularly do all the exercises your doctor or physical therapist advises    Support your knee as advised by your doctor or physical therapist    Increase training gradually, and ease up on training when needed    Have an expert check your gait for running or other sporting activities    Stretch properly before and after exercise    Replace your running shoes regularly    Lose excess weight     When to call your healthcare provider  Call your healthcare provider right away if:    Your symptoms don t get better after a few weeks of treatment    You have any new symptoms   Date Last Reviewed: 4/1/2017 2000-2018 [employer]. [address] [place] [address], [place] [address]. All rights reserved. This information is not intended as a substitute for professional medical care. Always follow your healthcare professional's instructions.           Patient Education     Viral Upper Respiratory Illness (Adult)    You have a viral upper respiratory illness (URI), which is another term for the common cold. This illness is contagious during the first few days. It is spread through the air by coughing and sneezing. It may also be spread by direct contact (touching the sick person and then touching your own eyes, nose, or mouth). Frequent handwashing will decrease risk of spread. Most viral illnesses go away within 7 to 10 days with rest and simple home remedies. Sometimes the illness may last for several weeks. Antibiotics will not kill a virus, and they are generally not prescribed for this condition.  Home care    If symptoms are severe, rest at home for the first 2 to 3 days. When you resume activity, don't let yourself get too tired.    Don't smoke. If you need help stopping, talk with your healthcare provider.    Avoid being exposed to cigarette smoke (yours or others ).    You  may use acetaminophen or ibuprofen to control pain and fever, unless another medicine was prescribed. If you have chronic liver or kidney disease, have ever had a stomach ulcer or gastrointestinal bleeding, or are taking blood-thinning medicines, talk with your healthcare provider before using these medicines. Aspirin should never be given to anyone under 18 years of age who is ill with a viral infection or fever. It may cause severe liver or brain damage.    Your appetite may be poor, so a light diet is fine. Stay well hydrated by drinking 6 to 8 glasses of fluids per day (water, soft drinks, juices, tea, or soup). Extra fluids will help loosen secretions in the nose and lungs.    Over-the-counter cold medicines will not shorten the length of time you re sick, but they may be helpful for the following symptoms: cough, sore throat, and nasal and sinus congestion. If you take prescription medicines, ask your healthcare provider or pharmacist which over-the-counter medicines are safe to use. (Note: Don't use decongestants if you have high blood pressure.)  Follow-up care  Follow up with your healthcare provider, or as advised.  When to seek medical advice  Call your healthcare provider right away if any of these occur:    Cough with lots of colored sputum (mucus)    Severe headache; face, neck, or ear pain    Difficulty swallowing due to throat pain    Fever of 100.4 F (38 C) or higher, or as directed by your healthcare provider  Call 911  Call 911 if any of these occur:    Chest pain, shortness of breath, wheezing, or difficulty breathing    Coughing up blood    Very severe pain with swallowing, especially if it goes along with a muffled voice   Date Last Reviewed: 6/1/2018 2000-2018 [employer]. [address] [place] [id number]. All rights reserved. This information is not intended as a substitute for professional medical care. Always follow your healthcare professional's  instructions.

## 2019-12-04 NOTE — PROGRESS NOTES
"Subjective     Nancy Bejarano is a 59 year old female who presents to clinic today for the following health issues:    HPI     Concern - WC knee injury follow up  Onset: 7/5/2016    Description:   Pt works at "[employer] in [place]. She states that she was moving boxes and the conveyer machine turned on and hit both knee causing pain    Intensity: mild    Progression of Symptoms:  same    Accompanying Signs & Symptoms:  none      Therapies Tried and outcome: Tylenol  Patient reports initial injury occurred in 2016 and prolonged standing worsens her pain.  She is a  at [employer] and stands for most of her shift.  No recent injury, no knee swelling, warmth, no popping but she reports that it catches (has occurred twice).  No knee instability, no falls,  She has taken tylenol for pain and has done physical therapy but states it has not helped appreciably. She also complains of pain in her upper and lower back, legs, \"hurts all over.\"    Exam Date Exam Time Accession # Performing Department Results    1/25/19  8:07 AM [id number] [facility]    PACS Images      Show images for XR Knee Bilateral 1/2 Views   Study Result     BILATERAL KNEES 2 VIEWS  1/25/2019 8:07 AM     HISTORY:  Injury.     COMPARISON:  7/5/2017     FINDINGS:  No fracture or osseous lesion is seen. The joint spaces are  well preserved. No soft tissue pathology is seen.                                                                      IMPRESSION:  Unremarkable examination.     AREI VALENTIN MD       Patient Active Problem List   Diagnosis     Hypertension goal BP (blood pressure) < 140/90     Hyperlipidemia LDL goal <130     Back pain     GERD (gastroesophageal reflux disease)     Abdominal pain, unspecified abdominal location     Cataracts, both eyes     Posterior vitreous detachment of both eyes     Left knee pain, unspecified chronicity     Overweight (BMI 25.0-29.9)     History reviewed. No pertinent " "surgical history.    Social History     Tobacco Use     Smoking status: Never Smoker     Smokeless tobacco: Never Used     Tobacco comment: lives in smoke free household.   Substance Use Topics     Alcohol use: No     Family History   Problem Relation Age of Onset     Diabetes No family hx of      Hypertension No family hx of      Breast Cancer No family hx of      Cancer - colorectal No family hx of      Cancer No family hx of      Cerebrovascular Disease No family hx of      Thyroid Disease No family hx of      Glaucoma No family hx of      Macular Degeneration No family hx of          Current Outpatient Medications   Medication Sig Dispense Refill     amLODIPine (NORVASC) 5 MG tablet TAKE 1 TABLET BY MOUTH EVERY DAY 30 tablet 2     atenolol (TENORMIN) 50 MG tablet Take 1 tablet (50 mg) by mouth daily 90 tablet 1     meloxicam (MOBIC) 7.5 MG tablet Take 1 tablet (7.5 mg) by mouth daily 90 tablet 1     methocarbamol (ROBAXIN) 750 MG tablet Take 1 tablet (750 mg) by mouth 3 times daily as needed for muscle spasms 60 tablet 3     BP Readings from Last 3 Encounters:   12/04/19 (!) 148/80   06/14/19 145/85   01/25/19 150/90    Wt Readings from Last 3 Encounters:   12/04/19 73.9 kg (163 lb)   06/14/19 75.8 kg (167 lb)   01/25/19 75.8 kg (167 lb)                  Reviewed and updated as needed this visit by Provider         Review of Systems   ROS COMP: Constitutional, HEENT, cardiovascular, pulmonary, gi and gu systems are negative, except as otherwise noted.      Objective    BP (!) 148/80   Pulse 90   Temp 97.6  F (36.4  C) (Oral)   Resp 20   Ht 1.6 m (5' 3\")   Wt 73.9 kg (163 lb)   SpO2 99%   BMI 28.87 kg/m    Body mass index is 28.87 kg/m .  Physical Exam   GENERAL: healthy, alert and no distress  EYES: Eyes grossly normal to inspection, PERRL and conjunctivae and sclerae normal  HENT: ear canals and TM's normal, nose and mouth without ulcers or lesions  NECK: no adenopathy, no asymmetry, masses, or scars and " thyroid normal to palpation  RESP: lungs clear to auscultation - no rales, rhonchi or wheezes  CV: regular rate and rhythm, normal S1 S2, no S3 or S4, no murmur, click or rub, no peripheral edema and peripheral pulses strong  ABDOMEN: soft, nontender, no hepatosplenomegaly, no masses and bowel sounds normal  MS: no gross musculoskeletal defects noted, no edema  SKIN: no suspicious lesions or rashes  NEURO: Normal strength and tone, mentation intact and speech normal  BACK: no CVA tenderness, no paralumbar tenderness  PSYCH: mentation appears normal, affect normal/bright  LYMPH: normal ant/post cervical, supraclavicular nodes    Diagnostic Test Results:  Labs reviewed in Epic  none       1/25/19  8:07 AM [id number] [facility]    PACS Images      Show images for XR Knee Bilateral 1/2 Views   Study Result     BILATERAL KNEES 2 VIEWS  1/25/2019 8:07 AM     HISTORY:  Injury.     COMPARISON:  7/5/2017     FINDINGS:  No fracture or osseous lesion is seen. The joint spaces are  well preserved. No soft tissue pathology is seen.                                                                      IMPRESSION:  Unremarkable examination.     ARIE VALENTIN MD         Assessment & Plan     1. Diffuse myofascial pain syndrome  Rx for Robaxin and Mobic for pain, recommended Patient referral but she states this has not helped in the past. Suggested consideration for Pain clinic to help manage her chronic pain but she declined this. Advised she rest when not working, reviewed proper body mechanics when lifting, bending/squatting. Return to clinic if not improved, new, or worsening symptoms.   - methocarbamol (ROBAXIN) 750 MG tablet; Take 1 tablet (750 mg) by mouth 3 times daily as needed for muscle spasms  Dispense: 60 tablet; Refill: 3  - meloxicam (MOBIC) 7.5 MG tablet; Take 1 tablet (7.5 mg) by mouth daily  Dispense: 90 tablet; Refill: 1    2. Chronic pain of both knees  Referred for physical therapy. Ok to  "use Mobic for pain/inflammation  - [facility]; Future     BMI:   Estimated body mass index is 28.87 kg/m  as calculated from the following:    Height as of this encounter: 1.6 m (5' 3\").    Weight as of this encounter: 73.9 kg (163 lb).           Work on weight loss  Regular exercise  See Patient Instructions    Return in about 4 weeks (around 1/1/2020), or if symptoms worsen or fail to improve, for BP Recheck.    SHANE Colón [facility]      "

## 2019-12-04 NOTE — PROGRESS NOTES
Subjective     Nancy Bejarano is a 59 year old female who presents to clinic today for the following health issues:    HPI   Hypertension Follow-up      Do you check your blood pressure regularly outside of the clinic? No     Are you following a low salt diet? No    Are your blood pressures ever more than 140 on the top number (systolic) OR more   than 90 on the bottom number (diastolic), for example 140/90?      How many servings of fruits and vegetables do you eat daily?  2-3    On average, how many sweetened beve rages do you drink each day (Examples: soda, juice, sweet tea, etc.  Do NOT count diet or artificially sweetened beverages)?   1    How many days per week do you miss taking your medication? 0    URI X 1 week- endorses cough- nonproductive,nasal congestion, upper back pain from coughing,sore throat from coughing,  no fever or chills, no headache, ear pain. No ill contacts, no recent travel.    Insomnia- occurs occasionally  Concern for possible anemia.        Patient Active Problem List   Diagnosis     Hypertension goal BP (blood pressure) < 140/90     Hyperlipidemia LDL goal <130     Back pain     GERD (gastroesophageal reflux disease)     Abdominal pain, unspecified abdominal location     Cataracts, both eyes     Posterior vitreous detachment of both eyes     Left knee pain, unspecified chronicity     Overweight (BMI 25.0-29.9)     History reviewed. No pertinent surgical history.    Social History     Tobacco Use     Smoking status: Never Smoker     Smokeless tobacco: Never Used     Tobacco comment: lives in smoke free household.   Substance Use Topics     Alcohol use: No     Family History   Problem Relation Age of Onset     Diabetes No family hx of      Hypertension No family hx of      Breast Cancer No family hx of      Cancer - colorectal No family hx of      Cancer No family hx of      Cerebrovascular Disease No family hx of      Thyroid Disease No family hx of      Glaucoma No family hx of       "Macular Degeneration No family hx of          Current Outpatient Medications   Medication Sig Dispense Refill     amLODIPine (NORVASC) 5 MG tablet Take 1 tablet (5 mg) by mouth daily 90 tablet 1     atenolol (TENORMIN) 50 MG tablet Take 1 tablet (50 mg) by mouth daily 90 tablet 1     benzonatate (TESSALON) 200 MG capsule Take 1 capsule (200 mg) by mouth 3 times daily as needed for cough 21 capsule 0     meloxicam (MOBIC) 7.5 MG tablet Take 1 tablet (7.5 mg) by mouth daily 90 tablet 1     methocarbamol (ROBAXIN) 750 MG tablet Take 1 tablet (750 mg) by mouth 3 times daily as needed for muscle spasms 60 tablet 3     BP Readings from Last 3 Encounters:   12/04/19 (!) 156/86   06/14/19 145/85   01/25/19 150/90    Wt Readings from Last 3 Encounters:   12/04/19 73.9 kg (163 lb)   06/14/19 75.8 kg (167 lb)   01/25/19 75.8 kg (167 lb)            Reviewed and updated as needed this visit by Provider         Review of Systems   ROS COMP: Constitutional, HEENT, cardiovascular, pulmonary, gi and gu systems are negative, except as otherwise noted.      Objective    BP (!) 148/80   Pulse 90   Temp 97.6  F (36.4  C) (Oral)   Resp 20   Ht 1.6 m (5' 3\")   Wt 73.9 kg (163 lb)   SpO2 99%   BMI 28.87 kg/m    Body mass index is 28.87 kg/m .  Physical Exam   GENERAL: healthy, alert and no distress  EYES: Eyes grossly normal to inspection, PERRL and conjunctivae and sclerae normal  HENT: ear canals and TM's normal, nose and mouth without ulcers or lesions  NECK: no adenopathy, no asymmetry, masses, or scars and thyroid normal to palpation  RESP: lungs clear to auscultation - no rales, rhonchi or wheezes  CV: regular rate and rhythm, normal S1 S2, no S3 or S4, no murmur, click or rub, no peripheral edema and peripheral pulses strong  ABDOMEN: soft, nontender, no hepatosplenomegaly, no masses and bowel sounds normal  MS: no gross musculoskeletal defects noted, no edema  SKIN: no suspicious lesions or rashes  NEURO: Normal strength and " "tone, mentation intact and speech normal  BACK: no CVA tenderness, no paralumbar tenderness  PSYCH: mentation appears normal, affect normal/bright  LYMPH: no cervical adenopathy    Diagnostic Test Results:  Labs reviewed in Epic  No results found for any visits on 12/04/19.        Assessment & Plan       1. Hypertension goal BP (blood pressure) < 140/90  Refilled BP medications, stressed the importance of takking as prescribed, recommended continued low salt diiet, regular acttvity, weight loss.  - atenolol (TENORMIN) 50 MG tablet; Take 1 tablet (50 mg) by mouth daily  Dispense: 90 tablet; Refill: 1  - amLODIPine (NORVASC) 5 MG tablet; Take 1 tablet (5 mg) by mouth daily  Dispense: 90 tablet; Refill: 1    4. Viral upper respiratory tract infection  push fluids, rest, symptomatic treatment as needed- humidified air, frequent fluids, Coricidin HBP for congestion    - benzonatate (TESSALON) 200 MG capsule; Take 1 capsule (200 mg) by mouth 3 times daily as needed for cough  Dispense: 21 capsule; Refill: 0    5. Screening for deficiency anemia    - **CBC with platelets FUTURE anytime; Future     BMI:   Estimated body mass index is 28.87 kg/m  as calculated from the following:    Height as of this encounter: 1.6 m (5' 3\").    Weight as of this encounter: 73.9 kg (163 lb).   Weight management plan: Discussed healthy diet and exercise guidelines        Work on weight loss  Regular exercise  See Patient Instructions    Return in about 4 weeks (around 1/1/2020), or if symptoms worsen or fail to improve, for BP Recheck.    SHANE Colón [facility]        "

## 2020-01-07 ENCOUNTER — THERAPY VISIT (OUTPATIENT)
Dept: PHYSICAL THERAPY | Facility: CLINIC | Age: 60
End: 2020-01-07
Attending: NURSE PRACTITIONER
Payer: OTHER MISCELLANEOUS

## 2020-01-07 DIAGNOSIS — M25.561 CHRONIC PAIN OF BOTH KNEES: ICD-10-CM

## 2020-01-07 DIAGNOSIS — M25.562 CHRONIC PAIN OF BOTH KNEES: ICD-10-CM

## 2020-01-07 DIAGNOSIS — G89.29 CHRONIC PAIN OF BOTH KNEES: ICD-10-CM

## 2020-01-07 PROCEDURE — 97161 PT EVAL LOW COMPLEX 20 MIN: CPT | Mod: GP | Performed by: PHYSICAL THERAPIST

## 2020-01-07 PROCEDURE — 97110 THERAPEUTIC EXERCISES: CPT | Mod: GP | Performed by: PHYSICAL THERAPIST

## 2020-01-07 NOTE — PROGRESS NOTES
[facility] Initial Evaluation  Subjective:  The history is provided by the patient. The history is limited by a language barrier. No  was used.   Nancy Bejarano being seen for B knee pain.   Problem began 12/4/2019. Where condition occurred: at work.Problem occurred: at work  and reported as 3/10 on pain scale. General health as reported by patient is good. Pertinent medical history includes:  None.    Surgeries include:  None.  Current medications:  None.   Primary job tasks include:  Repetitive tasks, prolonged standing and lifting/carrying.  Pain is described as aching and is intermittent. Pain is the same all the time. Since onset symptoms are gradually improving. Special tests:  X-ray. Previous treatment includes physical therapy. There was mild improvement following previous treatment.   Patient is  at [employer]. Restrictions include:  Working in normal job without restrictions.    Barriers include:  None as reported by patient.  Red flags:  None as reported by patient.  Type of problem:  Bilateral knees   Condition occurred with:  Other reason. This is a chronic condition   Problem details: Patient reports that she had an injury to her knees in July of 2016 at work when she was moving boxes and a conveyer turned on and hit her and pushed her into a wall. Her knees have bothered her on and off since that time. Difficult to get much more history from patient due to time constraints and language barriers.  .   Patient reports pain:  Medial and anterior. Radiates to:  Low back. Associated symptoms:  Loss of strength and numbness. Symptoms are exacerbated by ascending stairs, descending stairs and bending/squatting (5/10 pain to ascend stairs with railing) and relieved by activity/movement.                      Objective:  System    Ankle/Foot Evaluation  ROM:        Strength:      Plantarflexion: Left: 4/5   Pain:   Right: 4+/5   Pain:                                                                       Hip Evaluation    Hip Strength:      Extension:  Left: 4/5  Pain:Right: 4/5    Pain:    Abduction:  Left: 4/5     Pain:Right: 4/5    Pain:                           Knee Evaluation:  ROM:  AROM: normal  PROM: normal (ache with B passive extension)            Strength:     Extension:  Left: 4+/5    Pain:+      Right: 4+/5    Pain:+  Flexion:  Left:/5   Pain:-      Right: 5/5    Pain:-    Quad Set Left: Good    Pain:   Quad Set Right: Good    Pain:  Ligament Testing:  Not Assessed                Special Tests:   Left knee positive for the following special tests:  Patellar Compression  Right knee positive for the following tests:  Patellar Compression    Edema:  Normal            General     ROS    Assessment/Plan:    Patient is a 59 year old female with both sides knee complaints.    Patient has the following significant findings with corresponding treatment plan.                Diagnosis 1:  Chronic knee pain  Pain -  splint/taping/bracing/orthotics, self management, education, directional preference exercise and home program  Decreased strength - therapeutic exercise, therapeutic activities and home program  Decreased proprioception - neuro re-education, therapeutic activities and home program  Impaired muscle performance - neuro re-education and home program  Decreased function - therapeutic activities and home program    Therapy Evaluation Codes:   1) History comprised of:   Personal factors that impact the plan of care:      Time since onset of symptoms.    Comorbidity factors that impact the plan of care are:      None.     Medications impacting care: None.  2) Examination of Body Systems comprised of:   Body structures and functions that impact the plan of care:      Knee.   Activity limitations that impact the plan of care are:      Squatting/kneeling, Stairs, Walking and Working.  3) Clinical presentation characteristics  are:   Stable/Uncomplicated.  4) Decision-Making    Low complexity using standardized patient assessment instrument and/or measureable assessment of functional outcome.  Cumulative Therapy Evaluation is: Low complexity.    Previous and current functional limitations:  (See Goal Flow Sheet for this information)    Short term and Long term goals: (See Goal Flow Sheet for this information)     Communication ability:  Patient appears to be able to clearly communicate and understand verbal and written communication and follow directions correctly.  Treatment Explanation - The following has been discussed with the patient:   RX ordered/plan of care  Anticipated outcomes  Possible risks and side effects  This patient would benefit from PT intervention to resume normal activities.   Rehab potential is good.    Frequency:  1 X week, once daily  Duration:  for 8 weeks  Discharge Plan:  Achieve all LTG.  Independent in home treatment program.  Reach maximal therapeutic benefit.    Please refer to the daily flowsheet for treatment today, total treatment time and time spent performing 1:1 timed codes.

## 2020-01-19 DIAGNOSIS — M79.18 DIFFUSE MYOFASCIAL PAIN SYNDROME: ICD-10-CM

## 2020-01-19 DIAGNOSIS — I10 HYPERTENSION GOAL BP (BLOOD PRESSURE) < 140/90: ICD-10-CM

## 2020-01-19 NOTE — TELEPHONE ENCOUNTER
"Requested Prescriptions   Pending Prescriptions Disp Refills     meloxicam (MOBIC) 7.5 MG tablet [Pharmacy Med Name: MELOXICAM 7.5 MG TABLET] 30 tablet 5     Sig: TAKE 1 TABLET BY MOUTH EVERY DAY         Last Written Prescription Date:  12/4/19  Last Fill Quantity: 90,  # refills: 1   Last Office Visit with [facility], [facility] or [facility] prescribing provider:  12/4/19   Future Office Visit:         NSAID Medications Failed - 1/19/2020 12:30 PM        Failed - Blood pressure under 140/90 in past 12 months     BP Readings from Last 3 Encounters:   12/04/19 (!) 156/86   06/14/19 145/85   01/25/19 150/90                 Failed - Normal ALT on file in past 12 months     Recent Labs   Lab Test 07/05/17  1050   ALT 32             Failed - Normal AST on file in past 12 months     Recent Labs   Lab Test 07/05/17  1050   AST 20             Failed - Normal CBC on file in past 12 months     Recent Labs   Lab Test 02/17/18  1219   WBC 4.4   RBC 4.93   HGB 12.2   HCT 38.2                    Passed - Recent (12 mo) or future (30 days) visit within the authorizing provider's specialty     Patient has had an office visit with the authorizing provider or a provider within the authorizing providers department within the previous 12 mos or has a future within next 30 days. See \"Patient Info\" tab in inbasket, or \"Choose Columns\" in Meds & Orders section of the refill encounter.              Passed - Patient is age 6-64 years        Passed - Medication is active on med list        Passed - No active pregnancy on record        Passed - Normal serum creatinine on file in past 12 months     Recent Labs   Lab Test 06/14/19  0847   CR 0.80             Passed - No positive pregnancy test in past 12 months        amLODIPine (NORVASC) 5 MG tablet [Pharmacy Med Name: AMLODIPINE BESYLATE 5 MG TAB] 30 tablet 2     Sig: TAKE 1 TABLET BY MOUTH EVERY DAY         Last Written Prescription Date:  12/4/19  Last Fill Quantity: 90,  # refills: 1   Last Office " "[facility] or [facility] prescribing provider:  12/4/19   Future Office Visit:         Calcium Channel Blockers Protocol  Failed - 1/19/2020 12:30 PM        Failed - Blood pressure under 140/90 in past 12 months     BP Readings from Last 3 Encounters:   12/04/19 (!) 156/86   06/14/19 145/85   01/25/19 150/90                 Passed - Recent (12 mo) or future (30 days) visit within the authorizing provider's specialty     Patient has had an office visit with the authorizing provider or a provider within the authorizing providers department within the previous 12 mos or has a future within next 30 days. See \"Patient Info\" tab in inbasket, or \"Choose Columns\" in Meds & Orders section of the refill encounter.              Passed - Medication is active on med list        Passed - Patient is age 18 or older        Passed - No active pregnancy on record        Passed - Normal serum creatinine on file in past 12 months     Recent Labs   Lab Test 06/14/19  0847   CR 0.80             Passed - No positive pregnancy test in past 12 months        atenolol (TENORMIN) 50 MG tablet [Pharmacy Med Name: ATENOLOL 50 MG TABLET] 30 tablet 5     Sig: TAKE 1 TABLET BY MOUTH EVERY DAY         Last Written Prescription Date:  12/4/19  Last Fill Quantity: 90,  # refills: 1   Last Office Visit [place] [facility] or  [facility] prescribing provider:  12/4/19   Future Office Visit:         Beta-Blockers Protocol Failed - 1/19/2020 12:30 PM        Failed - Blood pressure under 140/90 in past 12 months     BP Readings from Last 3 Encounters:   12/04/19 (!) 156/86   06/14/19 145/85   01/25/19 150/90                 Passed - Patient is age 6 or older        Passed - Recent (12 mo) or future (30 days) visit within the authorizing provider's specialty     Patient has had an office visit with the authorizing provider or a provider within the authorizing providers department within the previous 12 mos or has a future within next 30 days. See \"Patient " "Info\" tab in inbasket, or \"Choose Columns\" in Meds & Orders section of the refill encounter.              Passed - Medication is active on med list              Gerald Faarax  [facility]  "

## 2020-01-19 NOTE — LETTER
[address]  [address]  [phone]    January 22, 2020      Nancy Bejarano  [address] [address]      Dear Nancy,    We have refilled your medications for one month.   We will need to see you for an office visit and lab work before any additional refills can be given.  Please call [phone] to schedule this appointment.      Thank you,    [facility]

## 2020-01-22 ENCOUNTER — TELEPHONE (OUTPATIENT)
Dept: FAMILY MEDICINE | Facility: CLINIC | Age: 60
End: 2020-01-22

## 2020-01-22 RX ORDER — ATENOLOL 50 MG/1
TABLET ORAL
Qty: 30 TABLET | Refills: 0 | Status: SHIPPED | OUTPATIENT
Start: 2020-01-22 | End: 2020-02-14

## 2020-01-22 RX ORDER — AMLODIPINE BESYLATE 5 MG/1
TABLET ORAL
Qty: 30 TABLET | Refills: 0 | Status: SHIPPED | OUTPATIENT
Start: 2020-01-22 | End: 2020-02-14

## 2020-01-22 RX ORDER — MELOXICAM 7.5 MG/1
TABLET ORAL
Qty: 30 TABLET | Refills: 0 | Status: SHIPPED | OUTPATIENT
Start: 2020-01-22 | End: 2020-02-14

## 2020-01-22 NOTE — TELEPHONE ENCOUNTER
Called and spoke to the patient and the  on the phone at the same time and explained that an appointment is needed. Scheduled the appointment for 1/29/2020 at 9:40 am with Dr Goldberg. They will bring in the forms or have them faxed over. I gave them the fax #. They both understand.  Sadaf Trejo [facility]  2nd Floor  Primary Care

## 2020-01-22 NOTE — TELEPHONE ENCOUNTER
Refilled prescription for 30 days only.  She needs to come in for labs (ordered) and BP recheck.  Yakelin LYNN, CNP

## 2020-01-22 NOTE — TELEPHONE ENCOUNTER
For melioxicam:  Routing refill request to provider for review/approval because:  Labs not current:  ALT, AST, CBC    For atenolol and amlodipine:  Routing refill request to provider for review/approval because:  BP is out of range.        Joshua Holden RN, BSN, PHN

## 2020-01-22 NOTE — TELEPHONE ENCOUNTER
Reason for Call:  Other Work Comp     Detailed comments: Pt's Spouse calling for she would like for Dr. Goldberg to submit Pt's  work comp to [employer] . He would like a call back to confirm work comp info has been faxed.    Phone Number Patient can be reached at: Other phone number:  [phone]    Best Time: anytime    Can we leave a detailed message on this number? YES    Call taken on 1/22/2020 at 8:29 AM by John Sharif

## 2020-01-29 ENCOUNTER — OFFICE VISIT (OUTPATIENT)
Dept: FAMILY MEDICINE | Facility: CLINIC | Age: 60
End: 2020-01-29
Payer: OTHER MISCELLANEOUS

## 2020-01-29 VITALS
BODY MASS INDEX: 28.95 KG/M2 | TEMPERATURE: 98.1 F | WEIGHT: 163.4 LBS | OXYGEN SATURATION: 100 % | RESPIRATION RATE: 24 BRPM | HEART RATE: 89 BPM | DIASTOLIC BLOOD PRESSURE: 82 MMHG | SYSTOLIC BLOOD PRESSURE: 164 MMHG

## 2020-01-29 DIAGNOSIS — Z02.6 ENCOUNTER RELATED TO WORKER'S COMPENSATION CLAIM: Primary | ICD-10-CM

## 2020-01-29 DIAGNOSIS — M25.562 ARTHRALGIA OF BOTH KNEES: ICD-10-CM

## 2020-01-29 DIAGNOSIS — M25.561 ARTHRALGIA OF BOTH KNEES: ICD-10-CM

## 2020-01-29 PROCEDURE — 99213 OFFICE O/P EST LOW 20 MIN: CPT | Performed by: PREVENTIVE MEDICINE

## 2020-01-29 ASSESSMENT — PAIN SCALES - GENERAL: PAINLEVEL: NO PAIN (0)

## 2020-01-29 NOTE — LETTER
January 29, 2020      Nancy Bejarano  [address] [address]        To Whom It May Concern:    Nancy Bejarano  was seen on 1/29/2020. She can return to work without restrictions today.     Sincerely,        Emerita Goldberg MD MPH

## 2020-01-29 NOTE — PROGRESS NOTES
Subjective     Nancy Bejarano is a 59 year old female who presents to clinic today for the following health issues:    HPI     Concern - Work Comp Forms   Onset: 12/14/2019    Description:   Work related injury to the knee. Does not even remember what side was injured. Review of records indicates chronic pain of both knees.    Intensity: mild    Progression of Symptoms:  same    Accompanying Signs & Symptoms:  none    Previous history of similar problem:   None     Precipitating factors:   Worsened by: none    Alleviating factors:  Improved by: PT is helping       Therapies Tried and outcome: PT    Here for some forms to be completed.  Not clear on what they want from me today  Has been at work without restrictions.  Sounds like just needs a letter saying can return to work without restrictions  No MMI forms they have with them so will give a clinic note.     Patient Active Problem List   Diagnosis     Hypertension goal BP (blood pressure) < 140/90     Hyperlipidemia LDL goal <130     Back pain     GERD (gastroesophageal reflux disease)     Abdominal pain, unspecified abdominal location     Cataracts, both eyes     Posterior vitreous detachment of both eyes     Left knee pain, unspecified chronicity     Overweight (BMI 25.0-29.9)     History reviewed. No pertinent surgical history.    Social History     Tobacco Use     Smoking status: Never Smoker     Smokeless tobacco: Never Used     Tobacco comment: lives in smoke free household.   Substance Use Topics     Alcohol use: No     Family History   Problem Relation Age of Onset     Diabetes No family hx of      Hypertension No family hx of      Breast Cancer No family hx of      Cancer - colorectal No family hx of      Cancer No family hx of      Cerebrovascular Disease No family hx of      Thyroid Disease No family hx of      Glaucoma No family hx of      Macular Degeneration No family hx of          Current Outpatient Medications   Medication Sig Dispense Refill      amLODIPine (NORVASC) 5 MG tablet TAKE 1 TABLET BY MOUTH EVERY DAY 30 tablet 0     atenolol (TENORMIN) 50 MG tablet TAKE 1 TABLET BY MOUTH EVERY DAY 30 tablet 0     benzonatate (TESSALON) 200 MG capsule Take 1 capsule (200 mg) by mouth 3 times daily as needed for cough 21 capsule 0     meloxicam (MOBIC) 7.5 MG tablet TAKE 1 TABLET BY MOUTH EVERY DAY 30 tablet 0     methocarbamol (ROBAXIN) 750 MG tablet Take 1 tablet (750 mg) by mouth 3 times daily as needed for muscle spasms 60 tablet 3     Allergies   Allergen Reactions     Asa [Dihydroxyaluminum Aminoacetate] Other (See Comments)     Epigastric   pain     BP Readings from Last 3 Encounters:   01/29/20 (!) 164/82   12/04/19 (!) 156/86   06/14/19 145/85    Wt Readings from Last 3 Encounters:   01/29/20 74.1 kg (163 lb 6.4 oz)   12/04/19 73.9 kg (163 lb)   06/14/19 75.8 kg (167 lb)               Reviewed and updated as needed this visit by Provider  Tobacco  Allergies  Meds  Problems  Med Hx  Surg Hx  Fam Hx         Review of Systems   ROS COMP: Constitutional, HEENT, cardiovascular, pulmonary, gi and gu systems are negative, except as otherwise noted.      Objective    BP (!) 164/82   Pulse 89   Temp 98.1  F (36.7  C) (Oral)   Resp 24   Wt 74.1 kg (163 lb 6.4 oz)   SpO2 100%   BMI 28.95 kg/m    Body mass index is 28.95 kg/m .  Physical Exam   GENERAL APPEARANCE: healthy, alert and no distress  EYES: Eyes grossly normal to inspection and conjunctivae and sclerae normal  RESP: lungs clear to auscultation - no rales, rhonchi or wheezes  CV: regular rates and rhythm, normal S1 S2, no S3 or S4 and no murmur, click or rub  MS: extremities normal- no gross deformities noted and peripheral pulses normal  SKIN: no suspicious lesions or rashes  NEURO: Normal strength and tone, mentation intact and speech normal  PSYCH: mentation appears normal    Bilateral Knees:  Inspection does not show any gross deformities.  No edema, no erythema, no skin changes, no rash.  No  tibial tuberosity tenderness, no tenderness along medial, and lateral joint lines.  No pes anserine bursitis.  No popliteal tenderness.  Full range of motion.  No quadriceps atrophy.  Strength is 5/5.  Neurovascular intact.  Anterior and posterior drawer signs are negative.  Varus and valgus stress negative.  Amalia test negative.                Diagnostic Test Results:  Labs reviewed in Epic  No results found for this or any previous visit (from the past 24 hour(s)).        Assessment & Plan     Nancy was seen today for work comp.    Diagnoses and all orders for this visit:    Encounter related to worker's compensation claim  -work note provided  -please see Letters section    Arthralgia of both knees  -doing better and has been back at work without restrictions per patient       Return in about 1 year (around 1/29/2021) for Routine Visit.    Emerita Goldberg MD MPH    [facility]

## 2020-02-14 DIAGNOSIS — M79.18 DIFFUSE MYOFASCIAL PAIN SYNDROME: ICD-10-CM

## 2020-02-14 DIAGNOSIS — I10 HYPERTENSION GOAL BP (BLOOD PRESSURE) < 140/90: ICD-10-CM

## 2020-02-14 NOTE — TELEPHONE ENCOUNTER
Routing refill request to provider for review/approval because:  Labs out of range:  ALT,AST, CBC  Failed the BP for all three medications.    Zoe Calhoun RN, [facility] Triage

## 2020-02-14 NOTE — LETTER
[address]  [address]  [phone]    February 20, 2020      Nancy Bejarano  [address] [address]      Dear Nancy,    We have refilled your medications for one month.   We will need to see you for an office visit and fasting labs before any additional refills can be given.  Please call [phone] to schedule this appointment.      Thank you,    [facility]

## 2020-02-14 NOTE — TELEPHONE ENCOUNTER
"Requested Prescriptions   Pending Prescriptions Disp Refills     atenolol (TENORMIN) 50 MG tablet  Last Written Prescription Date:  01/22/2020  Last Fill Quantity: 30,  # refills: 0   Last Office Visit with [facility] or [facility] prescribing provider: 01/29/2020-Yusuf     Future Office Visit:    30 tablet 0     Sig: Take 1 tablet (50 mg) by mouth daily       Beta-Blockers Protocol Failed - 2/14/2020 11:38 AM        Failed - Blood pressure under 140/90 in past 12 months     BP Readings from Last 3 Encounters:   01/29/20 (!) 164/82   12/04/19 (!) 156/86   06/14/19 145/85                 Passed - Patient is age 6 or older        Passed - Recent (12 mo) or future (30 days) visit within the authorizing provider's specialty     Patient has had an office visit with the authorizing provider or a provider within the authorizing providers department within the previous 12 mos or has a future within next 30 days. See \"Patient Info\" tab in [employer], or \"Choose Columns\" in Meds & Orders section of the refill encounter.              Passed - Medication is active on med list        amLODIPine (NORVASC) 5 MG tablet  Last Written Prescription Date:  01/22/2020  Last Fill Quantity: 30,  # refills: 0   Last Office Visit with [facility], [facility] or [facility] prescribing provider:  01/29/2020-Yusuf   Future Office Visit:    30 tablet 0     Sig: Take 1 tablet (5 mg) by mouth daily       Calcium Channel Blockers Protocol  Failed - 2/14/2020 11:38 AM        Failed - Blood pressure under 140/90 in past 12 months     BP Readings from Last 3 Encounters:   01/29/20 (!) 164/82   12/04/19 (!) 156/86   06/14/19 145/85                 Passed - Recent (12 mo) or future (30 days) visit within the authorizing provider's specialty     Patient has had an office visit with the authorizing provider or a provider within the authorizing providers department within the previous 12 mos or has a future within next 30 days. See \"Patient Info\" tab in [employer], or \"Choose " "Columns\" in Meds & Orders section of the refill encounter.              Passed - Medication is active on med list        Passed - Patient is age 18 or older        Passed - No active pregnancy on record        Passed - Normal serum creatinine on file in past 12 months     Recent Labs   Lab Test 06/14/19  0847   CR 0.80             Passed - No positive pregnancy test in past 12 months        meloxicam (MOBIC) 7.5 MG tablet  Last Written Prescription Date:  01/22/2020  Last Fill Quantity: 30,  # refills: 0   Last [place] Visit [place] [facility] [facility] or [facility] prescribing provider:  01/29/2020   Future Office Visit:    30 tablet 0     Sig: Take 1 tablet (7.5 mg) by mouth daily       NSAID Medications Failed - 2/14/2020 11:38 AM        Failed - Blood pressure under 140/90 in past 12 months     BP Readings from Last 3 Encounters:   01/29/20 (!) 164/82   12/04/19 (!) 156/86   06/14/19 145/85                 Failed - Normal ALT on file in past 12 months     Recent Labs   Lab Test 07/05/17  1050   ALT 32             Failed - Normal AST on file in past 12 months     Recent Labs   Lab Test 07/05/17  1050   AST 20             Failed - Normal CBC on file in past 12 months     Recent Labs   Lab Test 02/17/18  1219   WBC 4.4   RBC 4.93   HGB 12.2   HCT 38.2                    Passed - Recent (12 mo) or future (30 days) visit within the authorizing provider's specialty     Patient has had an office visit with the authorizing provider or a provider within the authorizing providers department within the previous 12 mos or has a future within next 30 days. See \"Patient Info\" tab in inbasket, or \"Choose Columns\" in Meds & Orders section of the refill encounter.              Passed - Patient is age 6-64 years        Passed - Medication is active on med list        Passed - No active pregnancy on record        Passed - Normal serum creatinine on file in past 12 months     Recent Labs   Lab Test 06/14/19  0847   CR 0.80             Passed " - No positive pregnancy test in past 12 months

## 2020-02-14 NOTE — TELEPHONE ENCOUNTER
Reason for Call:  Medication or medication refill:    Do you use a [facility]?  Name of the pharmacy and phone number for the current request:  Pt will call back with Pharmacy location    Name of the medication requested: amLODIPine (NORVASC) 5 MG tablet, atenolol (TENORMIN) 50 MG tablet      Other request: meloxicam (MOBIC) 7.5 MG tablet    Can we leave a detailed message on this number? YES    Phone number patient can be reached at: Other phone number:  [phone]    Best Time: anytime    Call taken on 2/14/2020 at 11:36 AM by John Sharif

## 2020-02-17 RX ORDER — AMLODIPINE BESYLATE 5 MG/1
5 TABLET ORAL DAILY
Qty: 30 TABLET | Refills: 0 | Status: SHIPPED | OUTPATIENT
Start: 2020-02-17 | End: 2021-05-27

## 2020-02-17 RX ORDER — MELOXICAM 7.5 MG/1
7.5 TABLET ORAL DAILY
Qty: 30 TABLET | Refills: 0 | Status: SHIPPED | OUTPATIENT
Start: 2020-02-17 | End: 2021-05-27

## 2020-02-17 RX ORDER — ATENOLOL 50 MG/1
50 TABLET ORAL DAILY
Qty: 30 TABLET | Refills: 0 | Status: SHIPPED | OUTPATIENT
Start: 2020-02-17 | End: 2020-03-11

## 2020-02-17 NOTE — TELEPHONE ENCOUNTER
This writer attempted to contact Patient on 02/17/20      Reason for call 30 day dino refill needs a lab only appt and  office visit to  follow up blood pressure. and left detailed message.      If patient calls back:   Schedule Lab only and Office Visit appointment within 2 weeks with PCP, postponing message.        Sadaf Trejo MA

## 2020-02-17 NOTE — TELEPHONE ENCOUNTER
Labs ordered in December 2019 nd January 2020.  She needs to come in and have them done, refilled Rx for 30 days only.  No further refills until she has labs done.  She is due for follow up within the month for her BP and should schedule an appt for this.    Yakelin LYNN, CNP

## 2020-03-08 DIAGNOSIS — I10 HYPERTENSION GOAL BP (BLOOD PRESSURE) < 140/90: ICD-10-CM

## 2020-03-08 NOTE — TELEPHONE ENCOUNTER
"Requested Prescriptions   Pending Prescriptions Disp Refills     atenolol (TENORMIN) 50 MG tablet [Pharmacy Med Name: ATENOLOL 50 MG TABLET] 30 tablet 0     Sig: TAKE 1 TABLET BY MOUTH EVERY DAY       Beta-Blockers Protocol Failed - 3/8/2020  9:49 AM        Failed - Blood pressure under 140/90 in past 12 months     BP Readings from Last 3 Encounters:   01/29/20 (!) 164/82   12/04/19 (!) 156/86   06/14/19 145/85                 Passed - Patient is age 6 or older        Passed - Recent (12 mo) or future (30 days) visit within the authorizing provider's specialty     Patient has had an office visit with the authorizing provider or a provider within the authorizing providers department within the previous 12 mos or has a future within next 30 days. See \"Patient Info\" tab in inbasket, or \"Choose Columns\" in Meds & Orders section of the refill encounter.              Passed - Medication is active on med list           Last Written Prescription Date:  02/17/20  Last Fill Quantity: 30 # refills: 0   Last office visit: 1/29/2020 with prescribing provider:     Future Office Visit:      "

## 2020-03-08 NOTE — LETTER
Nancy Bejarano  [address] [facility] [address]          03/19/20      Dear Nancy Bejarano        At [facility] we care about your health and are committed to providing quality patient care. Regular appointments are a vital part of the care and management of your health and can help prevent many of the complications that can occur.      It has come to our attention that your Provider sent in a refill on your medication for 90 days. She would like you to check you blood pressures at home and schedule a Telephone visit in about 2-3 weeks to follow up. Please call [phone] to schedule the Telephone visit.      Sincerely,      [facility] Care Team

## 2020-03-11 RX ORDER — ATENOLOL 50 MG/1
TABLET ORAL
Qty: 30 TABLET | Refills: 0 | Status: SHIPPED | OUTPATIENT
Start: 2020-03-11 | End: 2020-04-15 | Stop reason: DRUGHIGH

## 2020-03-11 NOTE — TELEPHONE ENCOUNTER
Routing refill request to provider for review/approval because:  Failed BP.    Zoe Calhoun RN, [facility] Triage

## 2020-03-12 NOTE — TELEPHONE ENCOUNTER
This writer attempted to contact Patient on 03/12/20      Reason for call need office visit and left message.      If patient calls back:   Schedule Office Visit appointment within 1 month with PCP, postponing message        Sadaf Trejo MA

## 2020-03-19 NOTE — TELEPHONE ENCOUNTER
I'll see her back in 90 days, but if she has a BP machine at home, she can be checking her BP's and we can do a phone visit in 2-3 weeks.  Yakelin LYNN, CNP

## 2020-03-19 NOTE — TELEPHONE ENCOUNTER
This writer attempted to contact Patient on 03/19/20      Reason for call give patient Namita's message and left message and sent letter.      If patient calls back:   Schedule Telephone Visit appointment within 1 month with PCP, document that pt called and close encounter         Sadaf Trejo MA

## 2020-03-19 NOTE — TELEPHONE ENCOUNTER
Do you want this changed to a Telephone visit?  Sadaf Trejo MA  [facility]  2nd Floor  Primary Care

## 2020-03-31 DIAGNOSIS — J06.9 VIRAL UPPER RESPIRATORY TRACT INFECTION: ICD-10-CM

## 2020-03-31 NOTE — LETTER
Nancy Bejarano  [address] [address]          04/07/20      Dear Nancy Bejarano        At [facility] we care about your health and are committed to providing quality patient care. Regular appointments are a vital part of the care and management of your health and can help prevent many of the complications that can occur.      It has come to our attention that you are due for a Telephone visit. Please call [facility] at [phone] soon to schedule your appointment.    If you have transferred care to another clinic please call to inform us so that we do not continue to send you reminder letters.      Sincerely,      [facility] Care Team

## 2020-04-01 RX ORDER — BENZONATATE 200 MG/1
200 CAPSULE ORAL 3 TIMES DAILY PRN
Qty: 21 CAPSULE | Refills: 0 | OUTPATIENT
Start: 2020-04-01

## 2020-04-06 ENCOUNTER — TELEPHONE (OUTPATIENT)
Dept: FAMILY MEDICINE | Facility: CLINIC | Age: 60
End: 2020-04-06

## 2020-04-06 NOTE — TELEPHONE ENCOUNTER
LM on pts home # for pt to call clinic.  Do not see a consent to speak with pts .    If pt calls back, please tell her she needs to fill out a release of information, prior to records being sent.    Ellen FIGUEROA, Patient Care

## 2020-04-06 NOTE — TELEPHONE ENCOUNTER
Reason for Call:  Other Work Comp    Detailed comments: Pt's Spouse  calling to request to have Pt's  work comp records from July 2017 to be sent to Pt's home address for her employer is requesting those.    Phone Number Patient can be reached at: Cell number on file:  [phone]    Best Time: anytime    Can we leave a detailed message on this number? YES    Call taken on 4/6/2020 at 8:40 AM by John Sharif          Pt calling to request work comp records from July 2017 to be sent to Pt's home address

## 2020-04-07 NOTE — TELEPHONE ENCOUNTER
Left a voicemail message on patient's home # to sign a records release form to have records released to her.  Sadaf Trejo [facility]  2nd Floor  Primary Care

## 2020-04-09 NOTE — TELEPHONE ENCOUNTER
Reason for Call:  Other Forms    Detailed comments: Pt's Spouse calling back and will relay message to Pt to sign records release form .    Phone Number Patient can be reached at: Cell number on file: [phone]       Best Time: anytime    Can we leave a detailed message on this number? YES    Call taken on 4/9/2020 at 10:16 AM by John Sharif

## 2020-04-09 NOTE — TELEPHONE ENCOUNTER
Noted.  will provide a release of records form for patient to fill out at the clinic and will forward the completed and signed form on to our off site records department.  Sadaf Trejo [facility]  2nd Floor  Primary Care

## 2020-04-11 DIAGNOSIS — I10 HYPERTENSION GOAL BP (BLOOD PRESSURE) < 140/90: ICD-10-CM

## 2020-04-11 NOTE — TELEPHONE ENCOUNTER
"Requested Prescriptions   Pending Prescriptions Disp Refills     atenolol (TENORMIN) 50 MG tablet [Pharmacy Med Name: ATENOLOL 50 MG TABLET] 30 tablet 0     Sig: TAKE 1 TABLET BY MOUTH EVERY DAY       Beta-Blockers Protocol Failed - 4/11/2020  9:25 AM        Failed - Blood pressure under 140/90 in past 12 months     BP Readings from Last 3 Encounters:   01/29/20 (!) 164/82   12/04/19 (!) 156/86   06/14/19 145/85                 Passed - Patient is age 6 or older        Passed - Recent (12 mo) or future (30 days) visit within the authorizing provider's specialty     Patient has had an office visit with the authorizing provider or a provider within the authorizing providers department within the previous 12 mos or has a future within next 30 days. See \"Patient Info\" tab in inbasket, or \"Choose Columns\" in Meds & Orders section of the refill encounter.              Passed - Medication is active on med list           Last Written Prescription Date:  3/11/2020  Last Fill Quantity: 30,  # refills: 0   Last office visit: No previous visit found with prescribing provider:  Yakelin Caldwell   Future Office Visit:      "

## 2020-04-11 NOTE — LETTER
[address] [address]  [phone]          April 28, 2020    Nancy Bejaraon                                                                                                                     [address] [facility] [address]            Dear Nancy,    You recently called for a refill of your Atenolol which was refilled but at a higher dose (100 mg daily) than you were taking before.  Are you still taking the Norvasc?  I do not see that you've had a refill on it for some time.  Your blood pressure has been elevated and we need to recheck it to ensure that it is adequately controlled.  Please schedule an Ancillary visit to have your BP rechecked.  We are now offering curbside BP checks so you do not have to come in to the clinic to be checked.  Please schedule a visit so we can follow up on this.    Sincerely,         Yakelin LYNN, CNP

## 2020-04-14 NOTE — TELEPHONE ENCOUNTER
Routing refill request to provider for review/approval because:  BP reading too elevated to pass [facility] refill protocol    Karla Sanchez RN  [facility]

## 2020-04-14 NOTE — TELEPHONE ENCOUNTER
Called and spoke to Nancy and gave her the # to the off site Medical Records dept to follow up on her request.  Sadaf Trejo MA  [facility]  2nd Floor  Primary Care

## 2020-04-14 NOTE — TELEPHONE ENCOUNTER
Reason for Call:  Other Forms    Detailed comments: Pt's Spouse calling for Pt did come into clinic to fill out release of records form and they are awaiting a call back to confirm that Pt's Work Comp records will be sent as soon as possible.    Phone Number Patient can be reached at: Cell number on file:  [phone]    Best Time: anytime    Can we leave a detailed message on this number? YES    Call taken on 4/14/2020 at 10:25 AM by John Sharif

## 2020-04-14 NOTE — TELEPHONE ENCOUNTER
Found ANA in my office. Faxed to [facility], [phone], right fax confirmed at 10:56 am today, 4/14/2020.  Sadaf Trejo [facility]  2nd Floor  Primary Care

## 2020-04-15 RX ORDER — ATENOLOL 100 MG/1
100 TABLET ORAL DAILY
Qty: 30 TABLET | Refills: 0 | OUTPATIENT
Start: 2020-04-15

## 2020-04-15 RX ORDER — ATENOLOL 100 MG/1
100 TABLET ORAL DAILY
Qty: 30 TABLET | Refills: 0 | Status: SHIPPED | OUTPATIENT
Start: 2020-04-15 | End: 2020-05-13

## 2020-04-15 NOTE — TELEPHONE ENCOUNTER
I increased her Atenolol dose to 100 mg daily.  She is to be checking her BP 2-3 times/week and do a virtual visit in 2- 3 weeks. Please find out if she is still taking the Norvasc- last fill was 2/17/20 for #30 tabs.    Yakelin LYNN, CNP

## 2020-04-16 NOTE — TELEPHONE ENCOUNTER
This writer attempted to contact pt on 04/16/20      Reason for call appt/ direction and left message.      If patient calls back:   Schedule Telephone Visit appointment within 2-3 weeks with PCP, document that pt called and close encounter     Inform per Namita  I increased her Atenolol dose to 100 mg daily.  She is to be checking her BP 2-3 times a week. Please find out if she is still taking the Norvasc?- last fill was 2/17/20 for #30 tabs.    Nilsa Puentes, CMA

## 2020-04-21 NOTE — TELEPHONE ENCOUNTER
This writer attempted to contact patient on 04/21/20      Reason for call ( see provider's message below )  and left message.      If patient calls back:   2nd floor [place] Care Team (MA/TC) called. Inform patient that someone from the team will contact them, document that pt called and route to care team.         Delaney Harrison MA

## 2020-04-22 NOTE — TELEPHONE ENCOUNTER
This writer attempted to contact patient on 04/22/20      Reason for call see provider's message below and left message.      If patient calls back:   2nd floor [place] Care Team (MA/TC) called. Inform patient that someone from the team will contact them, document that pt called and route to care team.         Delaney Harrison MA

## 2020-04-28 NOTE — TELEPHONE ENCOUNTER
Mailing signed letter faxed to the clinic to the patient's home address.  Sadaf Trejo MA  [facility]  2nd Floor  Primary Care

## 2020-05-07 ENCOUNTER — TELEPHONE (OUTPATIENT)
Dept: FAMILY MEDICINE | Facility: CLINIC | Age: 60
End: 2020-05-07

## 2020-05-07 NOTE — TELEPHONE ENCOUNTER
Panel Management Review   One phone call and send letter if unable to reach them or [employer] message and send letter if not read after 2 weeks (You will get a message to your inbasket)      BP Readings from Last 1 Encounters:   01/29/20 (!) 164/82        Health Maintenance Due   Topic Date Due     ADVANCE CARE PLANNING  1960     HIV SCREENING  02/14/1975     ZOSTER IMMUNIZATION (1 of 2) 02/14/2010     PREVENTIVE CARE VISIT  06/16/2015     EYE EXAM  07/16/2015     MAMMO SCREENING  08/05/2016     HPV TEST  06/16/2019     PAP  06/16/2019     PHQ-2  01/01/2020        Fail List measure: BP Check        Patient is due/failing the following:   BP CHECK    Action needed:   Patient needs nurse only appointment.    Type of outreach:    Phone, left message for patient to call back.   Please help patient schedule Drive Up BP check  Questions for provider review:    None                                                                                                                                   Genesis Patel

## 2020-05-11 DIAGNOSIS — I10 HYPERTENSION GOAL BP (BLOOD PRESSURE) < 140/90: ICD-10-CM

## 2020-05-11 NOTE — LETTER
May 14, 2020      Nancy Bejarano  [address] [address]        Dear ,    We have refilled your atenolol for 1 month   We will need to see you for a blood pressure check with a medical assistant and a virtual visit with your provider before any additional refills can be given.  Please call [phone] to schedule this appointment.      Sincerely,   [facility] Care Team

## 2020-05-13 RX ORDER — ATENOLOL 100 MG/1
TABLET ORAL
Qty: 30 TABLET | Refills: 0 | Status: SHIPPED | OUTPATIENT
Start: 2020-05-13 | End: 2020-06-12

## 2020-05-13 NOTE — TELEPHONE ENCOUNTER
Due for BP recheck- Ok for curbside BP check, follow back with virtual visit.  Yakelin LYNN, CNP

## 2020-06-08 DIAGNOSIS — E78.5 HYPERLIPIDEMIA LDL GOAL <130: ICD-10-CM

## 2020-06-08 DIAGNOSIS — I10 HYPERTENSION GOAL BP (BLOOD PRESSURE) < 140/90: ICD-10-CM

## 2020-06-08 DIAGNOSIS — Z12.11 SPECIAL SCREENING FOR MALIGNANT NEOPLASMS, COLON: Primary | ICD-10-CM

## 2020-06-08 NOTE — LETTER
Nancy Bejarano  [address] [address]          06/18/20      Dear Nancy Bejarano        At [facility] we care about your health and are committed to providing quality patient care. Regular appointments are a vital part of the care and management of your health and can help prevent many of the complications that can occur.      It has come to our attention that you are due for an Ancillary curbside blood pressure check, Curbside lab only appointment and then a few days later a Virtual visit with your provider . Please call [facility] at [phone] soon to schedule your 3 appointments.    If you have transferred care to another clinic please call to inform us so that we do not continue to send you reminder letters.      Sincerely,      [facility] Care Team

## 2020-06-12 RX ORDER — ATENOLOL 100 MG/1
TABLET ORAL
Qty: 14 TABLET | Refills: 0 | Status: SHIPPED | OUTPATIENT
Start: 2020-06-12 | End: 2021-05-27

## 2020-06-12 NOTE — TELEPHONE ENCOUNTER
Refilled X 14 days only- due for BP recheck (phong LOPEZ)  and fasting labs with virtual visit follow up. Orders placed.  Yakelin LYNN, CNP

## 2020-06-15 NOTE — TELEPHONE ENCOUNTER
This writer attempted to contact Patient  on 06/15/20      Reason for call Needs 3 appointments and left detailed message.      If patient calls back:   Schedule a Ancillary, lab only appointment and a virtual visit a few days later appointment within 2 weeks with, postponing message.         Sadaf Trejo MA

## 2020-06-18 NOTE — TELEPHONE ENCOUNTER
Bedside and Verbal shift change report given to Christiano Hinkle RN (oncoming nurse) by Cynthia Mahoney RN (offgoing nurse). Report included the following information SBAR, Kardex, OR Summary, Procedure Summary, Intake/Output and MAR. This writer attempted to contact Patient  on 06/18/20      Reason for call Needs 3 appointments and left detailed  Message and sent letter.      If patient calls back:         Schedule a Ancillary, lab only appointment and a virtual visit a few days later appointment within 2 weeks.        Sadaf Trejo MA

## 2020-07-09 DIAGNOSIS — Z12.11 SPECIAL SCREENING FOR MALIGNANT NEOPLASMS, COLON: ICD-10-CM

## 2020-07-09 PROCEDURE — 82274 ASSAY TEST FOR BLOOD FECAL: CPT | Performed by: NURSE PRACTITIONER

## 2020-07-13 LAB — HEMOCCULT STL QL IA: NEGATIVE

## 2020-07-31 ENCOUNTER — VIRTUAL VISIT (OUTPATIENT)
Dept: FAMILY MEDICINE | Facility: CLINIC | Age: 60
End: 2020-07-31
Payer: COMMERCIAL

## 2020-07-31 VITALS — SYSTOLIC BLOOD PRESSURE: 142 MMHG | HEART RATE: 77 BPM | DIASTOLIC BLOOD PRESSURE: 87 MMHG

## 2020-07-31 DIAGNOSIS — Z91.148 NONCOMPLIANCE WITH MEDICATION REGIMEN: ICD-10-CM

## 2020-07-31 DIAGNOSIS — Z53.20 HIV SCREENING DECLINED: ICD-10-CM

## 2020-07-31 DIAGNOSIS — I10 HYPERTENSION GOAL BP (BLOOD PRESSURE) < 140/90: Primary | ICD-10-CM

## 2020-07-31 DIAGNOSIS — E66.3 OVERWEIGHT: ICD-10-CM

## 2020-07-31 DIAGNOSIS — Z12.31 VISIT FOR SCREENING MAMMOGRAM: ICD-10-CM

## 2020-07-31 DIAGNOSIS — E78.5 HYPERLIPIDEMIA LDL GOAL <130: ICD-10-CM

## 2020-07-31 PROCEDURE — 99214 OFFICE O/P EST MOD 30 MIN: CPT | Mod: 95 | Performed by: NURSE PRACTITIONER

## 2020-07-31 RX ORDER — ATENOLOL 50 MG/1
50 TABLET ORAL DAILY
Qty: 90 TABLET | Refills: 0 | Status: SHIPPED | OUTPATIENT
Start: 2020-07-31 | End: 2021-02-17

## 2020-07-31 NOTE — PROGRESS NOTES
"Nancy Bejarano is a 60 year old female who is being evaluated via a billable telephone visit.      The patient has been notified of following:     \"This telephone visit will be conducted via a call between you and your physician/provider. We have found that certain health care needs can be provided without the need for a physical exam.  This service lets us provide the care you need with a short phone conversation.  If a prescription is necessary we can send it directly to your pharmacy.  If lab work is needed we can place an order for that and you can then stop by our lab to have the test done at a later time.    Telephone visits are billed at different rates depending on your insurance coverage. During this emergency period, for some insurers they may be billed the same as an in-person visit.  Please reach out to your insurance provider with any questions.    If during the course of the call the physician/provider feels a telephone visit is not appropriate, you will not be charged for this service.\"    Patient has given verbal consent for Telephone visit?  Yes    What phone number would you like to be contacted at? [phone]    How would you like to obtain your AVS? Mail a copy    Subjective     Nancy Bejarano is a 60 year old female who presents via phone visit today for the following health issues:    HPI    Hypertension Follow-up      Do you check your blood pressure regularly outside of the clinic? Yes     Are you following a low salt diet? Yes    Are your blood pressures ever more than 140 on the top number (systolic) OR more   than 90 on the bottom number (diastolic), for example 140/90? Yes 135/88 a few days go, 152/55., pulse 70. She has been out of Atenolol since the beginning of July and has been out of the Norvasc since March 2020.     How many servings of fruits and vegetables do you eat daily?  0-1    On average, how many sweetened beverages do you drink each day (Examples: soda, juice, sweet tea, etc.  " Do NOT count diet or artificially sweetened beverages)?   0    How many days per week do you exercise enough to make your heart beat faster? 3 or less    How many minutes a day do you exercise enough to make your heart beat faster? 9 or less    How many days per week do you miss taking your medication? 0        Hyperlipidemia Follow-Up      Are you regularly taking any medication or supplement to lower your cholesterol?   No    Are you having muscle aches or other side effects that you think could be caused by your cholesterol lowering medication?        How many servings of fruits and vegetables do you eat daily?  2-3    On average, how many sweetened beverages do you drink each day (Examples: soda, juice, sweet tea, etc.  Do NOT count diet or artificially sweetened beverages)?   1    How many days per week do you exercise enough to make your heart beat faster? 5    How many minutes a day do you exercise enough to make your heart beat faster? 30 - 60  How many days per week do you miss taking your medication? Has been out of medication for some time, cost is an issue    What makes it hard for you to take your medications?  cost of medication and remembering to take      Patient Active Problem List   Diagnosis     Hypertension goal BP (blood pressure) < 140/90     Hyperlipidemia LDL goal <130     Back pain     GERD (gastroesophageal reflux disease)     Abdominal pain, unspecified abdominal location     Cataracts, both eyes     Posterior vitreous detachment of both eyes     Left knee pain, unspecified chronicity     Overweight     History reviewed. No pertinent surgical history.    Social History     Tobacco Use     Smoking status: Never Smoker     Smokeless tobacco: Never Used     Tobacco comment: lives in smoke free household.   Substance Use Topics     Alcohol use: No     Family History   Problem Relation Age of Onset     Diabetes No family hx of      Hypertension No family hx of      Breast Cancer No family hx of       Cancer - colorectal No family hx of      Cancer No family hx of      Cerebrovascular Disease No family hx of      Thyroid Disease No family hx of      Glaucoma No family hx of      Macular Degeneration No family hx of          Current Outpatient Medications   Medication Sig Dispense Refill     amLODIPine (NORVASC) 5 MG tablet Take 1 tablet (5 mg) by mouth daily 30 tablet 0     atenolol (TENORMIN) 100 MG tablet TAKE 1 TABLET BY MOUTH EVERY DAY 14 tablet 0     benzonatate (TESSALON) 200 MG capsule Take 1 capsule (200 mg) by mouth 3 times daily as needed for cough 21 capsule 0     meloxicam (MOBIC) 7.5 MG tablet Take 1 tablet (7.5 mg) by mouth daily 30 tablet 0     methocarbamol (ROBAXIN) 750 MG tablet Take 1 tablet (750 mg) by mouth 3 times daily as needed for muscle spasms 60 tablet 3     BP Readings from Last 3 Encounters:   07/31/20 (!) 142/87   01/29/20 (!) 164/82   12/04/19 (!) 156/86    Wt Readings from Last 3 Encounters:   01/29/20 74.1 kg (163 lb 6.4 oz)   12/04/19 73.9 kg (163 lb)   06/14/19 75.8 kg (167 lb)                    Reviewed and updated as needed this visit by Provider         Review of Systems   Constitutional, HEENT, cardiovascular, pulmonary, gi and gu systems are negative, except as otherwise noted.       Objective   Reported vitals:  BP (!) 142/87   Pulse 77    healthy, alert and no distress  PSYCH: Alert and oriented times 3; coherent speech, normal   rate and volume, able to articulate logical thoughts, able   to abstract reason, no tangential thoughts, no hallucinations   or delusions  Her affect is normal and pleasant  RESP: No cough, no audible wheezing, able to talk in full sentences  Remainder of exam unable to be completed due to telephone visits    Diagnostic Test Results:  Labs reviewed in Epic  No results found for this or any previous visit (from the past 24 hour(s)).        Assessment/Plan:    1. Hypertension goal BP (blood pressure) < 140/90  Patient was taking 100 mg  Atenolol but has been off of it for several weeks.  Will start her back at 50 mg daily and she'll come to the clinic for BP check and labs.  Will not refill her Norvasc until her BP is verified. Explained that I will not refill her medication again until she has labs done.  We discussed low salt diet, benefits of regular exercise.  - atenolol (TENORMIN) 50 MG tablet; Take 1 tablet (50 mg) by mouth daily  Dispense: 90 tablet; Refill: 0  - OPTOMETRY REFERRAL    2. Hyperlipidemia LDL goal <130  Lipid panel ordered in June.  Low chol diet discussed, consider statin if LDL is elevated.    3. Overweight  Benefits of weight loss reviewed in detail, encouraged her to cut back on the carbohydrates in the diet, consume more fruits and vegetables, drink plenty of water, avoid fruit juices, sodas, get 150 min moderate exercise/week.  Recheck weight in 6 months.      4. Noncompliance with medication regimen  Cost is an issue for patient.  We discussed concern for complications of uncontrolled HYPERTENSION or HLD. We also discussed refill procedure so she is clear on how this is accomplished.  Strongly encouraged her to schedule her lb work and BP check for today or early next week.  She was also advised that she is overdue for her PE/pap.    5. Visit for screening mammogram    - MA SCREENING DIGITAL BILAT - Future  (s+30); Future    6. HIV screening declined  Patient declines HIV screening at this time.      No follow-ups on file.      Phone call duration:  21 minutes    SHANE Colón CNP

## 2020-07-31 NOTE — PATIENT INSTRUCTIONS
At [facility], we strive to deliver an exceptional experience to you, every time we see you.  If you receive a survey in the mail, please send us back your thoughts. We really do value your feedback.    Based on your medical history, these are the current health maintenance/preventive care services that you are due for (some may have been done at this visit.)  Health Maintenance Due   Topic Date Due     ADVANCE CARE PLANNING  1960     HIV SCREENING  02/14/1975     ZOSTER IMMUNIZATION (1 of 2) 02/14/2010     PREVENTIVE CARE VISIT  06/16/2015     EYE EXAM  07/16/2015     MAMMO SCREENING  08/05/2016     HPV TEST  06/16/2019     PAP  06/16/2019     PHQ-2  01/01/2020         Suggested websites for health information:  Www.[employer] : Up to date and easily searchable information on multiple topics.  Www.[employer].gov : medication info, interactive tutorials, watch real surgeries online  Www.familydoctor.org : good info from the Academy of Family Physicians  Www.cdc.gov : public health info, travel advisories, epidemics (H1N1)  Www.aap.org : children's health info, normal development, vaccinations  Www.health.[facility].[place].us : [place] dept of health, public health issues in [place], N1N1    Your care team:                            Family Medicine Internal Medicine   MD Ross Maxwell MD Shantel Branch-Fleming, MD Katya Georgiev PA-C Nam Ho, MD Pediatrics   EDITA Valdovinos, MD Nimo Romo CNP, MD Deborah Mielke, MD Kim Thein, APRN CNP      Clinic hours: Monday - Thursday 7 am-7 pm; Fridays 7 am-5 pm.   Urgent care: Monday - Friday 11 am-9 pm; Saturday and Sunday 9 am-5 pm.  Pharmacy : Monday -Thursday 8 am-8 pm; Friday 8 am-6 pm; Saturday and Sunday 9 am-5 pm.     Clinic: [phone]   Pharmacy: [phone]    Patient Education     Lifestyle Changes to Control Cholesterol  You can control your cholesterol  through diet, exercise, weight management, quitting smoking, stress management, and taking your medicines right. These things can also lower your risk for cardiovascular disease.    Eating healthy  Your healthcare provider will give you information on diet changes you may need to make. Your provider may recommend that you see a registered dietitian for help with diet changes. Changes may include:    Cutting back on the amount of fat and cholesterol in your meals    Eating less salt (sodium). This is especially important if you have high blood pressure.    Eating more fresh vegetables and fruits    Eating lean proteins such as fish, poultry, beans, and peas    Eating less red meat and processed meats    Using low-fat dairy products    Using vegetable and nut oils in limited amounts    Limiting how many sweets and processed foods like chips, cookies, and baked goods that you eat     Limiting how many sugar-sweetened beverages you drink    Limiting how often you eat out  Getting exercise  Regular exercise is a good way to help your body control cholesterol. Regular exercise can help in many ways. It can:    Raise your good cholesterol    Help lower your bad cholesterol    Let blood flow better through your body    Give more oxygen to your muscles and tissues    Help you manage your weight    Help your heart pump better    Lower your blood pressure  Your healthcare provider may recommend that you get more physical activity if you haven't been active. Your provider may recommend that you get moderate to vigorous physical activity for at least 40 minutes each day. You should do this for at least 3 to 4 days each week. A few examples of moderate to vigorous activity are:    Walking at a brisk pace. This is about 3 to 4 miles per hour.    Jogging or running    Swimming or water aerobics    Hiking    Dancing    Martial arts    Tennis    Riding a bicycle or stationary bike    Dancing  Managing your weight  If you are  overweight or obese, your healthcare provider will work with you to help you lose weight and lower your BMI (body mass index). Making diet changes and getting more physical activity can help. Changing your diet will help you lose weight more easily than adding exercise.  Quitting smoking  Smoking and other tobacco use can raise cholesterol and make it harder to control. Quitting is tough. But millions of people have given up tobacco for good. You can quit, too! Think about some of the reasons below to quit smoking. Do any of them make you think twice about your smoking habit?  Stop smoking because it:    Keeps your cholesterol high, even if you make all the other changes you re supposed to    Damages your body. It especially harms your heart, lungs, skin, and blood vessels.    Makes you more likely to have a heart attack (acute myocardial infarction), stroke, or cancer    Stains your teeth    Makes your skin, clothes, and breath smell bad    Costs a lot of money  Controlling stress   Learn ways to control stress. This will help you deal with stress in your home and work life. Controlling stress can greatly lower your risk of getting cardiovascular disease.  Making the most of medicines  Healthy eating and exercise are a good start to keeping your cholesterol down. But you may need some extra help from medicine. If your doctor prescribes medicine, be sure to take it exactly as directed. Remember:    Tell your healthcare provider about all other medicines you take. This includes vitamins and herbs.    Tell your healthcare provider if you have any side effects after starting to take a medicine. Examples of side effects to watch for include muscle aches, weakness, blurred vision, rust-colored urine, yellowing of eyes or skin (jaundice), and headache.    Don t skip a dose or stop taking your medicine because you feel better or because your cholesterol numbers go down. Never stop taking your medicine unless your  healthcare provider has told you it s OK.    Ask your healthcare provider if you have any questions about your medicines.  High risk groups  Some people may need to take medicines called statins to control their cholesterol. This is in addition to eating a healthy diet and getting regular exercise.  Statins can help you stay healthy. They can also help prevent a heart attack or stroke. You may need to take a statin if you are in one of these groups:    Adults who have had a heart attack or stroke. Or adults who have had peripheral vascular disease, a ministroke (transient ischemic attack), or stable or unstable angina. This group also includes people who have had a procedure to restore blood flow through a blocked artery. These procedures include percutaneous coronary intervention, angioplasty, stent, and open-heart bypass surgery.    Adults who have diabetes. Or adults who are at higher risk of having a heart attack or stroke and have an LDL cholesterol level of 70 to 189 mg/dL    Adults who are 21 years old or older and have an LDL cholesterol level of 190 mg/dL or higher.  If you are in a high-risk group, talk with your healthcare provider about your treatment goals. Make sure you understand why these goals are important, based on your own health history and your family history of heart disease or high cholesterol.  Make a plan to have regular cholesterol checks. You may need to fast before getting this test. Also ask your provider about any side effects your medicines may cause. Let your provider know about any side effects you have. You may need to take more than one medicine to reach the cholesterol goals that you and your provider decide on.  Date Last Reviewed: 10/1/2016    4346-2093 [employer]. [address] [place] [address], [place] [address]. All rights reserved. This information is not intended as a substitute for professional medical care. Always follow your healthcare professional's  instructions.           Patient Education     Uncontrolled High Blood Pressure (Established)    Your blood pressure was unusually high today. This can occur if you ve missed doses of your blood pressure medicine. Or it can happen if you are taking other medicines. These include some asthma inhalers, decongestants, diet pills, and street drugs like cocaine and amphetamine.  Other causes include:    Weight gain    More salt in your diet    Smoking    Caffeine  Your blood pressure can also rise if you are emotionally upset or in intense pain. It may go back to normal after a period of rest.  Blood pressure measurements are given as 2 numbers. Systolic blood pressure is the upper number. This is the pressure when the heart contracts. Diastolic blood pressure is the lower number. This is the pressure when the heart relaxes between beats. You will see your blood pressure readings written together. For example, a person with a systolic pressure of 118 and a diastolic pressure of 78 will have 118/78 written in the medical record. To be high blood pressure, the numbers must be higher when tested over a period of time.  Blood pressure is categorized as normal, elevated, or stage 1 or stage 2 high blood pressure:    Normal blood pressure is systolic of less than 120 and diastolic of less than 80 (120/80)    Elevated blood pressure is systolic of 120 to 129 and diastolic less than 80    Stage 1 high blood pressure is systolic is 130 to 139 or diastolic between 80 to 89    Stage 2 high blood pressure is when systolic is 140 or higher or the diastolic is 90 or higher  Uncontrolled high blood pressure can cause serious health problems. It raises your risk for heart attack, stroke, and heart failure. In general, if you have high blood pressure, keeping your blood pressure below 130/80 mmHg may help prevent these problems. Your healthcare provider may prescribe medicine to help control blood pressure if lifestyle changes are not  enough.  Home care  It s important to take steps to lower your blood pressure. If you are taking blood pressure medicine, the guidelines below may help you need less or no medicines in the future.    Start a weight-loss program if you are overweight.    Cut back on the amount of salt in your diet:  ? Avoid high-salt foods like olives, pickles, smoked meats, and salted potato chips.  ? Don t add salt to your food at the table.  ? Use only small amounts of salt when cooking.    Start an exercise program. Talk with your healthcare provider about what exercise program is best for you. It doesn t have to be difficult. Even brisk walking for 20 minutes 3 times a week is a good form of exercise.    Avoid medicines that stimulates the heart. This includes many over-the-counter cold and sinus decongestant pills and sprays, as well as diet pills. Check the warnings about high blood pressure on the label. Before purchasing any over-the-counter medicines or supplements, always ask the pharmacist about the product's potential interaction with your high blood pressure and your medicines.    Stimulants such as amphetamine or cocaine could be lethal for someone with high blood pressure. Never take these.    Limit how much caffeine you drink. Or switch to noncaffeinated beverages.    Stop smoking. If you are a long-time smoker, this can be hard. Enroll in a stop-smoking program to make it more likely that you will succeed. Talk with your provider about ways to quit.    Learn how to handle stress better. This is an important part of any program to lower blood pressure. Learn ways to relax. These include meditation, yoga, and biofeedback.    If medicines were prescribed, take them exactly as directed. Missing doses may cause your blood pressure to get out of control.    If you miss a dose or doses of your medicines, check with your healthcare provider or pharmacist about what to do.    Consider buying an automatic blood pressure  machine. Your provider may recommend a certain type. You can get one of these at most pharmacies. Measure your blood pressure twice a day, in the morning, and in the late afternoon. Keep a written record of your home blood pressure readings and take the record to your medical appointments.  Here are some additional guidelines on home blood pressure monitoring from the American Heart Association.    Don't smoke or drink coffee for 30 minutes    Go to the bathroom before the test.    Relax for 5 minutes before taking the measurement.    Sit correctly. Be sure your back is supported. Don't sit on a couch or soft chair. Uncross your feet and place them flat on the floor. Place your arm on a solid, flat surface like a table with the upper arm at heart level. Make certain the middle of the cuff is directly above the bend of the elbow. Check the monitor's instruction manual for an illustration.    Take multiple readings. When you measure, take 2 or 3 readings one minute apart and record all of the results.    Take your blood pressure at the same time every day, or as your healthcare provider recommends.    Record the date, time, and blood pressure reading.    Take the record with you to your next appointment. If your blood pressure monitor has a built-in memory, simply take the monitor with you to your next appointment.    Call your provider if you have several high readings. Don't be frightened by a single high reading, but if you get several high readings, check in with your healthcare provider.    Note: When blood pressure reaches a systolic (top number) of 180 or higher or a diastolic (bottom number) of 110 or higher, emergency medical treatment is required. Call your healthcare provider immediately.  Follow-up care  Regular visits to your own healthcare provider for blood pressure and medicine checks are an important part of your care. Make a follow-up appointment as directed. Bring the record of your home blood  pressure readings to the appointment.  When to seek medical advice  Call your healthcare provider right away if any of these occur:    Blood pressure reaches a systolic (top number) of 180 or higher or diastolic (bottom number) of 110 or higher, emergency medical treatment is required.    Chest, arm, shoulder, neck, or upper back pain    Shortness of breath    Severe headache    Throbbing or rushing sound in the ears    Nosebleed    Extreme drowsiness, confusion, or fainting    Dizziness or dizziness with spinning sensation (vertigo)    Weakness in an arm or leg or on one side of the face    Trouble speaking or seeing   Date Last Reviewed: 1/1/2017 2000-2019 [employer]. [address] [place] [address]. All rights reserved. This information is not intended as a substitute for professional medical care. Always follow your healthcare professional's instructions.

## 2021-02-15 DIAGNOSIS — I10 HYPERTENSION GOAL BP (BLOOD PRESSURE) < 140/90: ICD-10-CM

## 2021-02-15 NOTE — LETTER
Nancy Bejarano  [address] [facility] [facility] [address]          02/17/21      Dear Nancy Bejarano        At [facility] we care about your health and are committed to providing quality patient care. Regular appointments are a vital part of the care and management of your health and can help prevent many of the complications that can occur.      We have refilled your medication(s)  We will need you to schedule a Office visit with a Pap, labs and blood pressure check before any additional refills can be given.  Please call [phone] to schedule this appointment.      Thank you,    [facility]

## 2021-02-16 NOTE — TELEPHONE ENCOUNTER
BP Readings from Last 3 Encounters:   07/31/20 (!) 142/87   01/29/20 (!) 164/82   12/04/19 (!) 156/86     No appointment pending at this time.  Routing to provider to advise.    Linda Carvajal BSN, RN

## 2021-02-17 RX ORDER — ATENOLOL 50 MG/1
50 TABLET ORAL DAILY
Qty: 30 TABLET | Refills: 0 | Status: SHIPPED | OUTPATIENT
Start: 2021-02-17 | End: 2021-03-24

## 2021-03-21 DIAGNOSIS — I10 HYPERTENSION GOAL BP (BLOOD PRESSURE) < 140/90: ICD-10-CM

## 2021-03-23 NOTE — TELEPHONE ENCOUNTER
Atenolol refill request  To provider to advise; failed Refill protocol  Letter sent 2/17/21 to schedule appointment. No pending appt  Beta-Blockers Protocol Failed      Blood pressure under 140/90 in past 12 months        BP Readings from Last 3 Encounters:   07/31/20 (!) 142/87   01/29/20 (!) 164/82   12/04/19 (!) 156/86

## 2021-03-24 RX ORDER — ATENOLOL 50 MG/1
50 TABLET ORAL DAILY
Qty: 30 TABLET | Refills: 0 | Status: SHIPPED | OUTPATIENT
Start: 2021-03-24 | End: 2021-04-22

## 2021-05-27 ENCOUNTER — OFFICE VISIT (OUTPATIENT)
Dept: FAMILY MEDICINE | Facility: CLINIC | Age: 61
End: 2021-05-27
Payer: COMMERCIAL

## 2021-05-27 VITALS
OXYGEN SATURATION: 99 % | WEIGHT: 180 LBS | BODY MASS INDEX: 31.89 KG/M2 | DIASTOLIC BLOOD PRESSURE: 82 MMHG | HEART RATE: 92 BPM | HEIGHT: 63 IN | SYSTOLIC BLOOD PRESSURE: 153 MMHG

## 2021-05-27 DIAGNOSIS — M25.561 ACUTE PAIN OF RIGHT KNEE: ICD-10-CM

## 2021-05-27 DIAGNOSIS — Z11.4 SCREENING FOR HIV (HUMAN IMMUNODEFICIENCY VIRUS): ICD-10-CM

## 2021-05-27 DIAGNOSIS — Z12.31 ENCOUNTER FOR SCREENING MAMMOGRAM FOR BREAST CANCER: ICD-10-CM

## 2021-05-27 DIAGNOSIS — M79.18 DIFFUSE MYOFASCIAL PAIN SYNDROME: ICD-10-CM

## 2021-05-27 DIAGNOSIS — E78.5 HYPERLIPIDEMIA LDL GOAL <130: ICD-10-CM

## 2021-05-27 DIAGNOSIS — I10 HYPERTENSION GOAL BP (BLOOD PRESSURE) < 140/90: Primary | ICD-10-CM

## 2021-05-27 LAB
ALT SERPL W P-5'-P-CCNC: 34 U/L (ref 0–50)
ANION GAP SERPL CALCULATED.3IONS-SCNC: 4 MMOL/L (ref 3–14)
BUN SERPL-MCNC: 12 MG/DL (ref 7–30)
CALCIUM SERPL-MCNC: 9.4 MG/DL (ref 8.5–10.1)
CHLORIDE SERPL-SCNC: 104 MMOL/L (ref 94–109)
CHOLEST SERPL-MCNC: 220 MG/DL
CO2 SERPL-SCNC: 30 MMOL/L (ref 20–32)
CREAT SERPL-MCNC: 1.01 MG/DL (ref 0.52–1.04)
CREAT UR-MCNC: 201 MG/DL
GFR SERPL CREATININE-BSD FRML MDRD: 60 ML/MIN/{1.73_M2}
GLUCOSE SERPL-MCNC: 106 MG/DL (ref 70–99)
HDLC SERPL-MCNC: 55 MG/DL
HIV 1+2 AB+HIV1 P24 AG SERPL QL IA: NONREACTIVE
LDLC SERPL CALC-MCNC: 152 MG/DL
MICROALBUMIN UR-MCNC: 13 MG/L
MICROALBUMIN/CREAT UR: 6.67 MG/G CR (ref 0–25)
NONHDLC SERPL-MCNC: 165 MG/DL
POTASSIUM SERPL-SCNC: 3.8 MMOL/L (ref 3.4–5.3)
SODIUM SERPL-SCNC: 138 MMOL/L (ref 133–144)
TRIGL SERPL-MCNC: 66 MG/DL

## 2021-05-27 PROCEDURE — 99214 OFFICE O/P EST MOD 30 MIN: CPT | Performed by: NURSE PRACTITIONER

## 2021-05-27 PROCEDURE — 82043 UR ALBUMIN QUANTITATIVE: CPT | Performed by: NURSE PRACTITIONER

## 2021-05-27 PROCEDURE — 36415 COLL VENOUS BLD VENIPUNCTURE: CPT | Performed by: NURSE PRACTITIONER

## 2021-05-27 PROCEDURE — 80061 LIPID PANEL: CPT | Performed by: NURSE PRACTITIONER

## 2021-05-27 PROCEDURE — 87389 HIV-1 AG W/HIV-1&-2 AB AG IA: CPT | Performed by: NURSE PRACTITIONER

## 2021-05-27 PROCEDURE — 84460 ALANINE AMINO (ALT) (SGPT): CPT | Performed by: NURSE PRACTITIONER

## 2021-05-27 PROCEDURE — 80048 BASIC METABOLIC PNL TOTAL CA: CPT | Performed by: NURSE PRACTITIONER

## 2021-05-27 RX ORDER — AMLODIPINE BESYLATE 5 MG/1
5 TABLET ORAL DAILY
Qty: 90 TABLET | Refills: 1 | Status: SHIPPED | OUTPATIENT
Start: 2021-05-27 | End: 2023-04-17

## 2021-05-27 RX ORDER — MELOXICAM 7.5 MG/1
7.5 TABLET ORAL DAILY
Qty: 90 TABLET | Refills: 1 | Status: SHIPPED | OUTPATIENT
Start: 2021-05-27 | End: 2022-06-27

## 2021-05-27 RX ORDER — ATENOLOL 50 MG/1
50 TABLET ORAL DAILY
Qty: 90 TABLET | Refills: 1 | Status: SHIPPED | OUTPATIENT
Start: 2021-05-27 | End: 2022-06-20

## 2021-05-27 ASSESSMENT — PAIN SCALES - GENERAL: PAINLEVEL: WORST PAIN (10)

## 2021-05-27 ASSESSMENT — MIFFLIN-ST. JEOR: SCORE: 1350.6

## 2021-05-27 NOTE — PROGRESS NOTES
"    Assessment & Plan     Hypertension goal BP (blood pressure) < 140/90  She has been out of her BP medication for a few days, refilled medications and reviewed refill procedure.  Discussed low salt diet, benefits of regular exercise, weight loss as well, recheck BP in 1 month (ancillary visit).  - REVIEW OF HEALTH MAINTENANCE PROTOCOL ORDERS  - **Basic metabolic panel FUTURE anytime  - Albumin Random Urine Quantitative with Creat Ratio  - amLODIPine (NORVASC) 5 MG tablet  Dispense: 90 tablet; Refill: 1  - atenolol (TENORMIN) 50 MG tablet  Dispense: 90 tablet; Refill: 1    Hyperlipidemia LDL goal <130  Starting Zocor for elevated tot chol and LDL.  - **ALT FUTURE anytime  - Lipid panel reflex to direct LDL Non-fasting  - simvastatin (ZOCOR) 20 MG tablet  Dispense: 90 tablet; Refill: 3  - AST  - Lipid panel reflex to direct LDL Fasting    Acute pain of right knee  Refilled Mobic which helped with her knee pain in the past.  - meloxicam (MOBIC) 7.5 MG tablet  Dispense: 90 tablet; Refill: 1  - [facility] REFERR[facility]    Diffuse myofascial pain syndrome  Hurts all over- has had extensive lab work up in the past- getting her back on Mobic for pain, follow back if not improved, consider physical therapy referral.    Encounter for screening mammogram for breast cancer  Currently has open mammo order, recommended she schedule appt.    Screening for HIV (human immunodeficiency virus)    - HIV Antigen Antibody Combo           BMI:   Estimated body mass index is 31.89 kg/m  as calculated from the following:    Height as of this encounter: 1.6 m (5' 3\").    Weight as of this encounter: 81.6 kg (180 lb).   Weight management plan: Discussed healthy diet and exercise guidelines    Work on weight loss  Regular exercise  See Patient Instructions    Return in about 6 weeks (around 7/8/2021), or if symptoms worsen or fail to improve, for Follow up- knee pain and BP check.    SHANE Colón [facility] " DIAMANTE Cruz is a 61 year old who presents for the following health issues  accompanied by her spouse:    HPI     Musculoskeletal problem/pain  Onset/Duration: a couple years  Description  Location: knee - right  Joint Swelling: YES  Redness: no  Pain: YES  Warmth: no  Intensity:  10/10  Progression of Symptoms:  worsening  Accompanying signs and symptoms:   Fevers: no  Numbness/tingling/weakness: YES  History  Trauma to the area: YES  Recent illness:  no  Previous similar problem: no  Previous evaluation:  no  Precipitating or alleviating factors:  Aggravating factors include: overuse  Therapies tried and outcome: deep heating rub        1/25/19  8:07 AM [id number] [facility] Diamante Parekh    PACS Images     Show images for XR Knee Bilateral 1/2 Views   Study Result    BILATERAL KNEES 2 VIEWS  1/25/2019 8:07 AM     HISTORY:  Injury.     COMPARISON:  7/5/2017     FINDINGS:  No fracture or osseous lesion is seen. The joint spaces are  well preserved. No soft tissue pathology is seen.                                                                      IMPRESSION:  Unremarkable examination.     ARIE VALENTIN MD       Hyperlipidemia Follow-Up      Are you regularly taking any medication or supplement to lower your cholesterol?   No    Are you having muscle aches or other side effects that you think could be caused by your cholesterol lowering medication?      Hypertension Follow-up      Do you check your blood pressure regularly outside of the clinic? No     Are you following a low salt diet? No    Are your blood pressures ever more than 140 on the top number (systolic) OR more   than 90 on the bottom number (diastolic), for example 140/90?      How many servings of fruits and vegetables do you eat daily?  2-3    On average, how many sweetened beverages do you drink each day (Examples: soda, juice, sweet tea, etc.  Do NOT count diet or artificially sweetened beverages)?    "1    How many days per week do you exercise enough to make your heart beat faster? 3 or less    How many minutes a day do you exercise enough to make your heart beat faster? 9 or less    How many days per week do you miss taking your medication? 0      Review of Systems   Constitutional, HEENT, cardiovascular, pulmonary, gi and gu systems are negative, except as otherwise noted.      Objective    BP (!) 159/82 (BP Location: Left arm, Patient Position: Chair, Cuff Size: Adult Large)   Pulse 92   Ht 1.6 m (5' 3\")   Wt 81.6 kg (180 lb)   SpO2 99%   BMI 31.89 kg/m    Body mass index is 31.89 kg/m .  Physical Exam   GENERAL: healthy, alert and no distress  EYES: Eyes grossly normal to inspection, PERRL and conjunctivae and sclerae normal  HENT: ear canals and TM's normal, nose and mouth without ulcers or lesions  NECK: no adenopathy, no asymmetry, masses, or scars and thyroid normal to palpation  RESP: lungs clear to auscultation - no rales, rhonchi or wheezes  CV: regular rate and rhythm, normal S1 S2, no S3 or S4, no murmur, click or rub, no peripheral edema and peripheral pulses strong  ABDOMEN: soft, nontender, no hepatosplenomegaly, no masses and bowel sounds normal  MS: no gross musculoskeletal defects noted, no edema  SKIN: no suspicious lesions or rashes  NEURO: Normal strength and tone, mentation intact and speech normal  PSYCH: mentation appears normal, affect normal/bright  LYMPH: normal ant/post cervical, supraclavicular nodes    Results for orders placed or performed in visit on 05/27/21   HIV Antigen Antibody Combo     Status: None   Result Value Ref Range    HIV Antigen Antibody Combo Nonreactive NR^Nonreactive       **Basic metabolic panel FUTURE anytime     Status: Abnormal   Result Value Ref Range    Sodium 138 133 - 144 mmol/L    Potassium 3.8 3.4 - 5.3 mmol/L    Chloride 104 94 - 109 mmol/L    Carbon Dioxide 30 20 - 32 mmol/L    Anion Gap 4 3 - 14 mmol/L    Glucose 106 (H) 70 - 99 mg/dL    Urea " Nitrogen 12 7 - 30 mg/dL    Creatinine 1.01 0.52 - 1.04 mg/dL    GFR Estimate 60 (L) >60 mL/min/[1.73_m2]    GFR Estimate If Black 69 >60 mL/min/[1.73_m2]    Calcium 9.4 8.5 - 10.1 mg/dL   Albumin Random Urine Quantitative with Creat Ratio     Status: None   Result Value Ref Range    Creatinine Urine 201 mg/dL    Albumin Urine mg/L 13 mg/L    Albumin Urine mg/g Cr 6.67 0 - 25 mg/g Cr   **ALT FUTURE anytime     Status: None   Result Value Ref Range    ALT 34 0 - 50 U/L   Lipid panel reflex to direct LDL Non-fasting     Status: Abnormal   Result Value Ref Range    Cholesterol 220 (H) <200 mg/dL    Triglycerides 66 <150 mg/dL    HDL Cholesterol 55 >49 mg/dL    LDL Cholesterol Calculated 152 (H) <100 mg/dL    Non HDL Cholesterol 165 (H) <130 mg/dL

## 2021-05-27 NOTE — PATIENT INSTRUCTIONS
At [facility], we strive to deliver an exceptional experience to you, every time we see you. If you receive a survey, please complete it as we do value your feedback.  If you have MyChart, you can expect to receive results automatically within 24 hours of their completion.  Your provider will send a note interpreting your results as well.   If you do not have MyChart, you should receive your results in about a week by mail.    Your care team:                            Family Medicine Internal Medicine   MD Ross Maxwell MD Shantel Branch-Fleming, MD Srinivasa Vaka, MD Katya Belousova, PAPalakC  Jaz Wiggins, APRN CNP    Henry James, MD Pediatrics   Harsh Rosales, PAPalakC  Nevaeh Jones, CNP MD Molly Casanova APRN CNP   MD Nimo Shannon MD Deborah Mielke, MD Namita Caldwell, APRN [facility]      Clinic hours: Monday - Thursday 7 am-6 pm; Fridays 7 am-5 pm.   Urgent care: Monday - Friday 10 am- 8 pm; Saturday and Sunday 9 am-5 pm.    Clinic: [phone]       [facility]: Monday - Thursday 8 am - 7 pm; Friday 8 am - 6 pm  [facility]: [phone]     Use www.oncare.org for 24/7 diagnosis and treatment of dozens of conditions.    Patient Education     Knee Pain  Knee pain is very common. It s especially common in active people who put a lot of pressure on their knees, like runners. It affects women more often than men.  Your kneecap (patella) is a thick, round bone. It covers and protects the front portion of your knee joint. It moves along a groove in your thighbone (femur) as part of the patellofemoral joint. A layer of cartilage surrounds the underside of your kneecap. This layer protects it from grinding against your femur.  When this cartilage softens and breaks down, it can cause knee pain. This is partly because of repetitive stress. The stress irritates the lining of the  joint. This causes pain in the underlying bone.  What causes knee pain?  Many things can cause knee pain. You may have more than one cause. Some of these include:    Overuse of the knee joint    The kneecap doesn t line up with the tissue around it    Damage to small nerves in the area    Damage to the ligament-like structure that holds the kneecap in place (retinaculum)    Breakdown of the bone under the cartilage    Swelling in the soft tissues around the kneecap    Injury  You might be more likely to have knee pain if you:    Exercise a lot    Recently increased the intensity of your workouts    Have a body mass index (BMI) greater than 25    Have poor alignment of your kneecap    Walk with your feet turned overly outward or inward    Have weakness in surrounding muscle groups (inner quad or hip adductor muscles)    Have too much tightness in surrounding muscle groups (hamstrings or iliotibial band)    Have a recent history of injury to the area    Are female  Symptoms of knee pain  This type of knee pain is a dull, aching pain in the front of the knee in the area under and around the kneecap. This pain may start quickly or slowly. Your pain might be worse when you squat, run, or sit for a long time. Climbing stairs may be painful or hard to do. You might also sometimes feel like your knee is giving out. You may have symptoms in one or both of your knees.  Diagnosing knee pain  Your healthcare provider will ask about your medical history and your symptoms. Be sure to describe any activities that make your knee pain worse. He or she will look at your knee. This will include tests of your range of motion, strength, and areas of pain of your knee. Your knee alignment will be checked.  Your healthcare provider will need to rule out other causes of your knee pain, such as arthritis. You may need an imaging test, such as an X-ray or MRI.  Treatment for knee pain  Treatments that can help ease your symptoms may  include:    Avoiding activities for a while that make your pain worse, returning to activity over time    Icing the outside of your knee when it causes you pain    Taking over-the-counter pain medicine such as NSAIDs    Wearing a knee brace or taping your knee to support it    Compression to help prevent swelling    Wearing special shoe inserts to help keep your feet in the proper alignment    Elevating your knee    Doing special exercises to stretch and strengthen the muscles around your hip and your knee  These steps help most people manage knee pain. But some cases of knee pain need to be treated with surgery. You rarely need surgery right away. You may need it later if other treatments don t work. Your healthcare provider may refer you to an orthopedic surgeon. He or she will talk with you about your choices.  Preventing knee pain  Losing weight and correcting excess muscle tightness or muscle weakness may help lower your risk.  In some cases, you can prevent knee pain. To help prevent a flare-up of knee pain, do these things:    Regularly do all the exercises your doctor or physical therapist advises    Warm up fully before exercising    Support your knee as advised by your doctor or physical therapist    Increase training gradually, and ease up on training when needed    Have an expert check your gait for running or other sporting activities    Stretch properly before and after exercise    Replace your running shoes regularly    Lose excess weight  When to call your healthcare provider  Call your healthcare provider right away if:    Your symptoms don t get better after a few weeks of treatment    You have any new symptoms  Jes last reviewed this educational content on 6/1/2019 2000-2021 [employer]. All rights reserved. This information is not intended as a substitute for professional medical care. Always follow your healthcare professional's instructions.

## 2021-05-27 NOTE — LETTER
May 27, 2021      Nancy Bejarano  [address] [address]        To Whom It May Concern:    Nancy Bejarano was seen in our clinic. She may return to work with the following: limited to light duty - lifting no greater than 10 pounds, no climbing and standing limited to 4 hrs on or about 6/1/21.      Sincerely,        SHANE Colón CNP

## 2021-06-02 ENCOUNTER — TELEPHONE (OUTPATIENT)
Dept: FAMILY MEDICINE | Facility: CLINIC | Age: 61
End: 2021-06-02

## 2021-06-02 RX ORDER — SIMVASTATIN 20 MG
20 TABLET ORAL AT BEDTIME
Qty: 90 TABLET | Refills: 3 | Status: SHIPPED | OUTPATIENT
Start: 2021-06-02 | End: 2023-04-17

## 2021-06-02 NOTE — LETTER
June 8, 2021      Nancy Bejarano  [address]  [place] [address]              Dear Nancy,       Your HIV screen was negative/normal.     Your urine microalbumin was normal for you.     Your cholesterol was abnormal. Genetics, diet, weight and low exercise levels can contribute to this.  Elevated LDL cholesterol and triglycerides as well as low HDL cholesterol all increase a person's risk for heart and vascular disease.       A low fat and low cholesterol diet, weight loss and regular exercise to see if you can't improve this a bit.  RI also recommend starting a statin medication to get your LDL to less than 130.  I have placed an order for Zocor 20 mg to be taken daily at bedtime.  We'll check your liver function test and cholesterol numbers again in 3 months.     Your blood sugar was slightly elevated at 106 indicating prediabetes.  Please work on losing some weight, consuming a low carbohydrate diet, and getting regular exercise to help prevent/delay a diabetes diagnosis.     Your kidney function is also slightly decreased.  We'll monitor this annually.  Keeping a normal blood pressure is very important to protect your kidneys.  Take your BP medication daily- set a phone alarm if you need to be reminded to take it.,  Let's follow back again in 3 months.      Sincerely,  Yakelin LYNN, CNP

## 2021-06-02 NOTE — TELEPHONE ENCOUNTER
Left message on answering machine for patient to call back RN at [facility] regarding lab results.  Namita Leonardo, RN    Please relay this message when she calls:   Yakelin Caldwell APRN CNP   6/2/2021  7:03 AM CDT      Hi Nancy,     Your HIV screen was negative/normal.     Your urine microalbumin was normal for you.     Your cholesterol was abnormal. Genetics, diet, weight and low exercise levels can contribute to this.  Elevated LDL cholesterol and triglycerides as well as low HDL cholesterol all increase a person's risk for heart and vascular disease.       a low fat and low cholesterol diet, weight loss and regular exercise to see if you can't improve this a bit.  RI also recommend starting a statin medication to get your LDL to less than 130.  I have placed an order for Zocor 20 mg to be taken daily at bedtime.  We'll check your liver function test and cholesterol numbers again in 3 months.     Your blood sugar was slightly elevated at 106 indicating prediabetes.  Please work on losing some weight, consuming a low carbohydrate diet, and getting regular exercise to help prevent/delay a diabetes diagnosis.     Your kidney function is also slightly decreased.  We'll monitor this annually.  Keeping a normal blood pressure is very important to protect your kidneys.  Take your BP medication daily- set a phone alarm if you need to be reminded to take it.,  Let's follow back again in 3 months.        Yakelin LYNN, CNP

## 2021-06-02 NOTE — LETTER
June 9, 2021      Nancy Bejarano  [address]  [place] [address]        Dear ,    We are writing to inform you of your test results.    Hi Nancy,     Your HIV screen was negative/normal.     Your urine microalbumin was normal for you.     Your cholesterol was abnormal. Genetics, diet, weight and low exercise levels can contribute to this.  Elevated LDL cholesterol and triglycerides as well as low HDL cholesterol all increase a person's risk for heart and vascular disease.       a low fat and low cholesterol diet, weight loss and regular exercise to see if you can't improve this a bit.  RI also recommend starting a statin medication to get your LDL to less than 130.  I have placed an order for Zocor 20 mg to be taken daily at bedtime.  We'll check your liver function test and cholesterol numbers again in 3 months.     Your blood sugar was slightly elevated at 106 indicating prediabetes.  Please work on losing some weight, consuming a low carbohydrate diet, and getting regular exercise to help prevent/delay a diabetes diagnosis.     Your kidney function is also slightly decreased.  We'll monitor this annually.  Keeping a normal blood pressure is very important to protect your kidneys.  Take your BP medication daily- set a phone alarm if you need to be reminded to take it.,  Let's follow back again in 3 months.     If you have any questions or concerns, please call the clinic at the number listed above.       Sincerely,    Yakelin Caldwell

## 2021-06-03 NOTE — TELEPHONE ENCOUNTER
Left message on answering machine for patient to call back to [phone].    RN please give patient provider message as written.  Claudia VALLADARESN, RN

## 2021-06-04 NOTE — TELEPHONE ENCOUNTER
This writer attempted to contact patient on 06/04/21      Reason for call results and left message.      If patient calls back:   Registered Nurse called. Follow Triage Call workflow        Leticia Kovacs RN

## 2021-06-08 NOTE — TELEPHONE ENCOUNTER
Attempted to call patient (4th attempt) left message with [facility] number to call back.    Routing to Yakelin Caldwell NP to please advise if ok to mail letter.    Letter is pended for review.  Will need to print and mail if ok with PCP    Bharti Hernandez RN, [facility]

## 2021-06-14 ENCOUNTER — TELEPHONE (OUTPATIENT)
Dept: FAMILY MEDICINE | Facility: CLINIC | Age: 61
End: 2021-06-14

## 2021-06-14 NOTE — TELEPHONE ENCOUNTER
Patient called for her lab results from end of May.  I read the letter to her from CHRISTINA Caldwell CNP, regarding her 5/27/21 lab results.  Made aware of prescription for the zocor that was sent to her [facility] pharmacy.  Instructed on dosage instructions. Informed of when to be seen again with provider and when to recheck fasting labwork (3 months) Aware letter sent to her with this information as well.  Updated her phone numbers.  Namita Leonardo RN

## 2021-06-16 ENCOUNTER — TELEPHONE (OUTPATIENT)
Dept: FAMILY MEDICINE | Facility: CLINIC | Age: 61
End: 2021-06-16

## 2021-06-16 NOTE — TELEPHONE ENCOUNTER
Pt and pt  on the phone.  Pt gave verbal ok to speak with .  He has questions about her new medication.  Reviewed pt last lab result note with pt .  Advised she should continue to exercise, he said she is already, that she should lower the carbohydrates in her diet and work on weight loss, even 5 lb's would help.  Advised if any muscle cramping let us know.      Pt states she works nights starting at about 5 pm to about 4-5 am.  Since this is a different bedtime than most people she would like to clarify when she should be taking the simvastatin, is it ok to take in the am after work?    Ok to leave response on husbands vm per pt.  Claudia VALLADARESN, RN

## 2021-06-16 NOTE — TELEPHONE ENCOUNTER
Patient's  answered the phone and informed on the message below as patient was not available.  Per provider, patient to take atorvastin before going to sleep.    Diamante Hernandez RN

## 2021-07-05 ENCOUNTER — THERAPY VISIT (OUTPATIENT)
Dept: PHYSICAL THERAPY | Facility: CLINIC | Age: 61
End: 2021-07-05
Attending: NURSE PRACTITIONER
Payer: COMMERCIAL

## 2021-07-05 DIAGNOSIS — M25.561 CHRONIC PAIN OF RIGHT KNEE: ICD-10-CM

## 2021-07-05 DIAGNOSIS — G89.29 CHRONIC PAIN OF RIGHT KNEE: ICD-10-CM

## 2021-07-05 DIAGNOSIS — M25.561 ACUTE PAIN OF RIGHT KNEE: ICD-10-CM

## 2021-07-05 PROCEDURE — 97161 PT EVAL LOW COMPLEX 20 MIN: CPT | Mod: GP | Performed by: PHYSICAL THERAPIST

## 2021-07-05 PROCEDURE — 97110 THERAPEUTIC EXERCISES: CPT | Mod: GP | Performed by: PHYSICAL THERAPIST

## 2021-07-05 ASSESSMENT — ACTIVITIES OF DAILY LIVING (ADL)
SQUAT: ACTIVITY IS VERY DIFFICULT
STIFFNESS: THE SYMPTOM AFFECTS MY ACTIVITY SLIGHTLY
KNEEL ON THE FRONT OF YOUR KNEE: ACTIVITY IS VERY DIFFICULT
SWELLING: THE SYMPTOM AFFECTS MY ACTIVITY SLIGHTLY
PAIN: THE SYMPTOM AFFECTS MY ACTIVITY MODERATELY
LIMPING: THE SYMPTOM AFFECTS MY ACTIVITY MODERATELY
GO DOWN STAIRS: ACTIVITY IS VERY DIFFICULT
WEAKNESS: THE SYMPTOM AFFECTS MY ACTIVITY MODERATELY
AS_A_RESULT_OF_YOUR_KNEE_INJURY,_HOW_WOULD_YOU_RATE_YOUR_CURRENT_LEVEL_OF_DAILY_ACTIVITY?: NEARLY NORMAL
HOW_WOULD_YOU_RATE_THE_CURRENT_FUNCTION_OF_YOUR_KNEE_DURING_YOUR_USUAL_DAILY_ACTIVITIES_ON_A_SCALE_FROM_0_TO_100_WITH_100_BEING_YOUR_LEVEL_OF_KNEE_FUNCTION_PRIOR_TO_YOUR_INJURY_AND_0_BEING_THE_INABILITY_TO_PERFORM_ANY_OF_YOUR_USUAL_DAILY_ACTIVITIES?: 40
KNEE_ACTIVITY_OF_DAILY_LIVING_SCORE: 41.43
GO UP STAIRS: ACTIVITY IS FAIRLY DIFFICULT
STAND: ACTIVITY IS FAIRLY DIFFICULT
WALK: ACTIVITY IS FAIRLY DIFFICULT
GIVING WAY, BUCKLING OR SHIFTING OF KNEE: THE SYMPTOM AFFECTS MY ACTIVITY MODERATELY
SIT WITH YOUR KNEE BENT: ACTIVITY IS SOMEWHAT DIFFICULT
KNEE_ACTIVITY_OF_DAILY_LIVING_SUM: 29
HOW_WOULD_YOU_RATE_THE_OVERALL_FUNCTION_OF_YOUR_KNEE_DURING_YOUR_USUAL_DAILY_ACTIVITIES?: NEARLY NORMAL
RISE FROM A CHAIR: ACTIVITY IS SOMEWHAT DIFFICULT
RAW_SCORE: 29

## 2021-07-05 NOTE — PROGRESS NOTES
Physical Therapy Initial Evaluation  Subjective:  The history is provided by the patient and the spouse. A  was used.   Patient Health History  Nancy Bejarano being seen for R knee pain.     Problem began: 5/27/2021.   Problem occurred: unknown   Pain is reported as 8/10 on pain scale.  General health as reported by patient is good.  Pertinent medical history includes: high blood pressure.   Red flags:  Pain at rest/night.  Medical allergies: none.   Surgeries include:  None.    Current medications:  High blood pressure medication. Other medications details: tylenol- PRN.    Current occupation is works for [employer].   Primary job tasks include:  Repetitive tasks, prolonged standing, pushing/pulling and lifting/carrying.                  Therapist Generated HPI Evaluation  Problem details: Patient presents to PT today with c/o R knee pain.  Patient stated in 2016 she injured the R knee at work when she was hit by a conveyor belt.  Patient has had on and off since 2016.   Pt feels her R knee pain is worsening because now she is standing more in one area vs moving around like she use to do 1 year ago..         Type of problem:  Right knee.    This is a recurrent condition.  Condition occurred with:  Insidious onset.  Where condition occurred: for unknown reasons.  Patient reports pain:  In the joint, medial and posterior.  Pain is described as aching, sharp and cramping and is constant.  Pain radiates to:  Thigh and lower leg (outer thigh and back of leg). Pain is the same all the time.  Since onset symptoms are unchanged.  Associated symptoms:  Loss of motion/stiffness and buckling/giving out.  and relieved by muscle relaxants, rest, analgesics, ice and activity/movement.  Special tests included:  X-ray.  Previous treatment includes physical therapy (1x in 2020). There was none improvement following previous treatment.  Restrictions due to condition include:  Working in normal job without  restrictions.  Barriers include:  None as reported by patient.                        Objective:    Gait:    Gait Type:  Antalgic   Assistive Devices:  None  Deviations:  Lumbar:  Trunk lean LHip:  Hip hiking RKnee:  Knee extension decr R                                                      Knee Evaluation:  ROM:  AROM: normal            Strength:     Extension:  Left: 5/5   Pain:      Right: 4/5    Pain:-  Flexion:  Left: 5/5   Pain:      Right: 5/5   Pain:    Quad Set Left: Good    Pain:   Quad Set Right: Fair    Pain:  Ligament Testing:        Valgus 0:  Right:  Trace and Pos  Valgus 30:  Right:  Pos and Trace  Anterior Drawer:  Right:  Neg      Special Tests:     Right knee positive for the following tests:  Patellar Compression and Patellar Tracking-Abduction Lateral  Palpation:  Palpation of knee: tenderness over pes answerine.    Right knee tenderness present at:  Medial Joint Line and Patellar Medial  Edema:  Normal            General     ROS    Assessment/Plan:    Patient is a 61 year old female with right side knee complaints.    Patient has the following significant findings with corresponding treatment plan.                Diagnosis 1:  R knee pain  Pain -  hot/cold therapy, US and manual therapy  Decreased strength - therapeutic exercise and therapeutic activities  Impaired gait - gait training  Impaired muscle performance - neuro re-education  Decreased function - therapeutic activities    Therapy Evaluation Codes:   1) History comprised of:   Personal factors that impact the plan of care:      Past/current experiences.    Comorbidity factors that impact the plan of care are:      High blood pressure.     Medications impacting care: High blood pressure.  2) Examination of Body Systems comprised of:   Body structures and functions that impact the plan of care:      Knee.   Activity limitations that impact the plan of care are:      Stairs, Standing and Working.  3) Clinical presentation characteristics  are:   Stable/Uncomplicated.  4) Decision-Making    Low complexity using standardized patient assessment instrument and/or measureable assessment of functional outcome.  Cumulative Therapy Evaluation is: Low complexity.    Previous and current functional limitations:  (See Goal Flow Sheet for this information)    Short term and Long term goals: (See Goal Flow Sheet for this information)     Communication ability:  Patient appears to be able to clearly communicate and understand verbal and written communication and follow directions correctly.  Treatment Explanation - The following has been discussed with the patient:   RX ordered/plan of care  Anticipated outcomes  Possible risks and side effects  This patient would benefit from PT intervention to resume normal activities.   Rehab potential is good.    Frequency:  1 X week, once daily  Duration:  for 8 weeks  Discharge Plan:  Achieve all LTG.  Independent in home treatment program.  Reach maximal therapeutic benefit.    Please refer to the daily flowsheet for treatment today, total treatment time and time spent performing 1:1 timed codes.

## 2021-07-12 ENCOUNTER — THERAPY VISIT (OUTPATIENT)
Dept: PHYSICAL THERAPY | Facility: CLINIC | Age: 61
End: 2021-07-12
Attending: NURSE PRACTITIONER
Payer: COMMERCIAL

## 2021-07-12 DIAGNOSIS — M25.561 CHRONIC PAIN OF RIGHT KNEE: ICD-10-CM

## 2021-07-12 DIAGNOSIS — G89.29 CHRONIC PAIN OF RIGHT KNEE: ICD-10-CM

## 2021-07-12 PROCEDURE — 97110 THERAPEUTIC EXERCISES: CPT | Mod: GP | Performed by: PHYSICAL THERAPIST

## 2021-07-12 PROCEDURE — 97112 NEUROMUSCULAR REEDUCATION: CPT | Mod: GP | Performed by: PHYSICAL THERAPIST

## 2021-07-19 ENCOUNTER — THERAPY VISIT (OUTPATIENT)
Dept: PHYSICAL THERAPY | Facility: CLINIC | Age: 61
End: 2021-07-19
Attending: NURSE PRACTITIONER
Payer: COMMERCIAL

## 2021-07-19 DIAGNOSIS — M25.561 CHRONIC PAIN OF RIGHT KNEE: ICD-10-CM

## 2021-07-19 DIAGNOSIS — G89.29 CHRONIC PAIN OF RIGHT KNEE: ICD-10-CM

## 2021-07-19 PROCEDURE — 97110 THERAPEUTIC EXERCISES: CPT | Mod: GP | Performed by: PHYSICAL THERAPIST

## 2021-07-19 PROCEDURE — 97112 NEUROMUSCULAR REEDUCATION: CPT | Mod: GP | Performed by: PHYSICAL THERAPIST

## 2021-08-03 ENCOUNTER — THERAPY VISIT (OUTPATIENT)
Dept: PHYSICAL THERAPY | Facility: CLINIC | Age: 61
End: 2021-08-03
Payer: COMMERCIAL

## 2021-08-03 DIAGNOSIS — G89.29 CHRONIC PAIN OF RIGHT KNEE: ICD-10-CM

## 2021-08-03 DIAGNOSIS — M25.561 CHRONIC PAIN OF RIGHT KNEE: ICD-10-CM

## 2021-08-03 PROCEDURE — 97112 NEUROMUSCULAR REEDUCATION: CPT | Mod: GP | Performed by: PHYSICAL THERAPIST

## 2021-08-03 PROCEDURE — 97110 THERAPEUTIC EXERCISES: CPT | Mod: GP | Performed by: PHYSICAL THERAPIST

## 2021-08-03 ASSESSMENT — ACTIVITIES OF DAILY LIVING (ADL)
SWELLING: I DO NOT HAVE THE SYMPTOM
SIT WITH YOUR KNEE BENT: ACTIVITY IS NOT DIFFICULT
RISE FROM A CHAIR: ACTIVITY IS MINIMALLY DIFFICULT
KNEE_ACTIVITY_OF_DAILY_LIVING_SCORE: 81.43
KNEE_ACTIVITY_OF_DAILY_LIVING_SUM: 57
GIVING WAY, BUCKLING OR SHIFTING OF KNEE: I HAVE THE SYMPTOM BUT IT DOES NOT AFFECT MY ACTIVITY
PAIN: I HAVE THE SYMPTOM BUT IT DOES NOT AFFECT MY ACTIVITY
HOW_WOULD_YOU_RATE_THE_OVERALL_FUNCTION_OF_YOUR_KNEE_DURING_YOUR_USUAL_DAILY_ACTIVITIES?: NORMAL
WALK: ACTIVITY IS MINIMALLY DIFFICULT
STAND: ACTIVITY IS NOT DIFFICULT
RAW_SCORE: 57
KNEEL ON THE FRONT OF YOUR KNEE: ACTIVITY IS SOMEWHAT DIFFICULT
SQUAT: ACTIVITY IS SOMEWHAT DIFFICULT
AS_A_RESULT_OF_YOUR_KNEE_INJURY,_HOW_WOULD_YOU_RATE_YOUR_CURRENT_LEVEL_OF_DAILY_ACTIVITY?: NORMAL
GO UP STAIRS: ACTIVITY IS MINIMALLY DIFFICULT
HOW_WOULD_YOU_RATE_THE_CURRENT_FUNCTION_OF_YOUR_KNEE_DURING_YOUR_USUAL_DAILY_ACTIVITIES_ON_A_SCALE_FROM_0_TO_100_WITH_100_BEING_YOUR_LEVEL_OF_KNEE_FUNCTION_PRIOR_TO_YOUR_INJURY_AND_0_BEING_THE_INABILITY_TO_PERFORM_ANY_OF_YOUR_USUAL_DAILY_ACTIVITIES?: 95
STIFFNESS: I DO NOT HAVE THE SYMPTOM
LIMPING: I HAVE THE SYMPTOM BUT IT DOES NOT AFFECT MY ACTIVITY
WEAKNESS: I HAVE THE SYMPTOM BUT IT DOES NOT AFFECT MY ACTIVITY
GO DOWN STAIRS: ACTIVITY IS SOMEWHAT DIFFICULT

## 2021-08-03 NOTE — PROGRESS NOTES
"Subjective:  [facility]  Physical Exam       Knee Activity of Daily Living Score: 81.43            Objective:  System    Physical Exam    General     ROS    Assessment/Plan:    PROGRESS  REPORT    Progress reporting period is from 7/5/2021 to 8/3/2021.       SUBJECTIVE  Subjective changes noted by patient:  Patient stated overall the R knee is feeling much better; patient is 95% full function and doing exercises 2x/day sometimes    Current pain level is : 0/10.     Previous pain level was  NA .   Changes in function:  Yes (See Goal flowsheet attached for changes in current functional level)  Adverse reaction to treatment or activity: None    OBJECTIVE  Changes noted in objective findings:    Reviewed HEP.    R knee ROM: WNL; no pain.    LE strength is good.    Proprioception: 30\" no touch on both sides     ASSESSMENT/PLAN  Updated problem list and treatment plan: Diagnosis 1:  R knee pain  Impaired muscle performance - home program  Decreased function - therapeutic activities and home program  STG/LTGs have been met or progress has been made towards goals:  Yes (See Goal flow sheet completed today.)  Assessment of Progress: The patient's condition is improving.  Self Management Plans:  Patient is independent in a home treatment program.    Nancy continues to require the following intervention to meet STG and LTG's:  PT intervention is no longer required to meet STG/LTG.    Recommendations:  This patient is ready to be discharged from therapy and continue their home treatment program.    Please refer to the daily flowsheet for treatment today, total treatment time and time spent performing 1:1 timed codes.          "

## 2022-02-23 ENCOUNTER — IMMUNIZATION (OUTPATIENT)
Dept: NURSING | Facility: CLINIC | Age: 62
End: 2022-02-23
Payer: COMMERCIAL

## 2022-02-23 PROCEDURE — 0051A COVID-19,PF,PFIZER (12+ YRS): CPT

## 2022-02-23 PROCEDURE — 91305 COVID-19,PF,PFIZER (12+ YRS): CPT

## 2022-03-16 ENCOUNTER — IMMUNIZATION (OUTPATIENT)
Dept: NURSING | Facility: CLINIC | Age: 62
End: 2022-03-16
Attending: FAMILY MEDICINE
Payer: COMMERCIAL

## 2022-03-16 PROCEDURE — 0052A COVID-19,PF,PFIZER (12+ YRS): CPT

## 2022-03-16 PROCEDURE — 91305 COVID-19,PF,PFIZER (12+ YRS): CPT

## 2022-05-16 ENCOUNTER — OFFICE VISIT (OUTPATIENT)
Dept: URGENT CARE | Facility: URGENT CARE | Age: 62
End: 2022-05-16
Payer: COMMERCIAL

## 2022-05-16 VITALS
BODY MASS INDEX: 29.97 KG/M2 | TEMPERATURE: 99.2 F | OXYGEN SATURATION: 98 % | SYSTOLIC BLOOD PRESSURE: 138 MMHG | HEART RATE: 99 BPM | DIASTOLIC BLOOD PRESSURE: 88 MMHG | WEIGHT: 169.2 LBS

## 2022-05-16 DIAGNOSIS — M79.661 PAIN IN BOTH LOWER LEGS: ICD-10-CM

## 2022-05-16 DIAGNOSIS — M79.662 PAIN IN BOTH LOWER LEGS: ICD-10-CM

## 2022-05-16 DIAGNOSIS — R03.0 ELEVATED BLOOD PRESSURE READING: Primary | ICD-10-CM

## 2022-05-16 PROCEDURE — 99213 OFFICE O/P EST LOW 20 MIN: CPT | Performed by: PHYSICIAN ASSISTANT

## 2022-05-16 RX ORDER — MELOXICAM 7.5 MG/1
7.5 TABLET ORAL DAILY
Qty: 30 TABLET | Refills: 0 | Status: SHIPPED | OUTPATIENT
Start: 2022-05-16 | End: 2022-06-27

## 2022-05-16 ASSESSMENT — ENCOUNTER SYMPTOMS
ARTHRALGIAS: 1
MYALGIAS: 1

## 2022-05-16 NOTE — PROGRESS NOTES
SUBJECTIVE:   Nancy Bejarano is a 62 year old female presenting with a chief complaint of   Chief Complaint   Patient presents with     Leg Pain     body pain       She is an established patient of [facility].  Patient presents with bilateral leg pain and back pain on and off for 1 month.  No trauma.  Does not take mobic.  Dizziness on and off.  Patient took blood pressure medications.          Review of Systems   Musculoskeletal: Positive for arthralgias and myalgias.   All other systems reviewed and are negative.      Past Medical History:   Diagnosis Date     Cataracts, both eyes 8/24/2012     Hyperlipidemia LDL goal <130 12/27/2011     Hypertension      TB (tuberculosis)      Family History   Problem Relation Age of Onset     Diabetes No family hx of      Hypertension No family hx of      Breast Cancer No family hx of      Cancer - colorectal No family hx of      Cancer No family hx of      Cerebrovascular Disease No family hx of      Thyroid Disease No family hx of      Glaucoma No family hx of      Macular Degeneration No family hx of      Current Outpatient Medications   Medication Sig Dispense Refill     amLODIPine (NORVASC) 5 MG tablet Take 1 tablet (5 mg) by mouth daily 90 tablet 1     atenolol (TENORMIN) 50 MG tablet Take 1 tablet (50 mg) by mouth daily 90 tablet 1     benzonatate (TESSALON) 200 MG capsule Take 1 capsule (200 mg) by mouth 3 times daily as needed for cough 21 capsule 0     meloxicam (MOBIC) 7.5 MG tablet Take 1 tablet (7.5 mg) by mouth daily 30 tablet 0     meloxicam (MOBIC) 7.5 MG tablet Take 1 tablet (7.5 mg) by mouth daily 90 tablet 1     methocarbamol (ROBAXIN) 750 MG tablet Take 1 tablet (750 mg) by mouth 3 times daily as needed for muscle spasms 60 tablet 3     simvastatin (ZOCOR) 20 MG tablet Take 1 tablet (20 mg) by mouth At Bedtime 90 tablet 3     Social History     Tobacco Use     Smoking status: Never Smoker     Smokeless tobacco: Never Used     Tobacco comment: lives in smoke free  household.   Substance Use Topics     Alcohol use: No       OBJECTIVE  BP (!) 171/90   Pulse 99   Temp 99.2  F (37.3  C) (Tympanic)   Wt 76.7 kg (169 lb 3.2 oz)   SpO2 98%   BMI 29.97 kg/m      Physical Exam  Vitals reviewed.   Constitutional:       General: She is not in acute distress.     Appearance: Normal appearance. She is normal weight. She is not ill-appearing.   Eyes:      Extraocular Movements: Extraocular movements intact.      Conjunctiva/sclera: Conjunctivae normal.   Cardiovascular:      Rate and Rhythm: Normal rate.   Musculoskeletal:      Comments: Patient is able to ambulate,  flex, extend, side flex and twist normally.  SLR negative strength in lower extremities normal.  Warmth to extremities.  Skin over back normal.  No tenderness to bony spine.  Bilateral PSIS tenderness.       Neurological:      General: No focal deficit present.      Mental Status: She is alert.         Labs:  No results found for this or any previous visit (from the past 24 hour(s)).    X-Ray was not done.    ASSESSMENT:      ICD-10-CM    1. Elevated blood pressure reading  R03.0    2. Pain in both lower legs  M79.661 Physical Therapy Referral    M79.662 meloxicam (MOBIC) 7.5 MG tablet        Medical Decision Making:    Differential Diagnosis:  DJD, deconditioned.      Serious Comorbid Conditions:  Adult:  reviewed    PLAN:    Blood pressure to be repeated before discharge.  Patient asked to monitor blood pressure and follow up with PCP for management.    Rx for mobic and PT.  Follow up with pcp.      Followup:    If not improving or if condition worsens, follow up with your Primary Care Provider, If not improving or if conditions worsens over the next 12-24 hours, go to the Emergency Department    There are no Patient Instructions on file for this visit.

## 2022-06-27 ENCOUNTER — OFFICE VISIT (OUTPATIENT)
Dept: URGENT CARE | Facility: URGENT CARE | Age: 62
End: 2022-06-27
Payer: COMMERCIAL

## 2022-06-27 VITALS
HEART RATE: 81 BPM | TEMPERATURE: 98.1 F | WEIGHT: 170.6 LBS | OXYGEN SATURATION: 99 % | DIASTOLIC BLOOD PRESSURE: 80 MMHG | BODY MASS INDEX: 30.22 KG/M2 | SYSTOLIC BLOOD PRESSURE: 165 MMHG

## 2022-06-27 DIAGNOSIS — M54.42 ACUTE BILATERAL LOW BACK PAIN WITH BILATERAL SCIATICA: Primary | ICD-10-CM

## 2022-06-27 DIAGNOSIS — M79.604 LEG PAIN, BILATERAL: ICD-10-CM

## 2022-06-27 DIAGNOSIS — M54.41 ACUTE BILATERAL LOW BACK PAIN WITH BILATERAL SCIATICA: Primary | ICD-10-CM

## 2022-06-27 DIAGNOSIS — M79.605 LEG PAIN, BILATERAL: ICD-10-CM

## 2022-06-27 PROCEDURE — 99214 OFFICE O/P EST MOD 30 MIN: CPT | Performed by: PHYSICIAN ASSISTANT

## 2022-06-27 RX ORDER — IBUPROFEN 600 MG/1
600 TABLET, FILM COATED ORAL EVERY 6 HOURS PRN
Qty: 30 TABLET | Refills: 0 | Status: SHIPPED | OUTPATIENT
Start: 2022-06-27

## 2022-06-27 RX ORDER — PREDNISONE 20 MG/1
20 TABLET ORAL 2 TIMES DAILY
Qty: 14 TABLET | Refills: 0 | Status: SHIPPED | OUTPATIENT
Start: 2022-06-27 | End: 2022-07-04

## 2022-06-27 RX ORDER — METHOCARBAMOL 500 MG/1
500 TABLET, FILM COATED ORAL 4 TIMES DAILY PRN
Qty: 30 TABLET | Refills: 0 | Status: SHIPPED | OUTPATIENT
Start: 2022-06-27 | End: 2023-04-17

## 2022-06-27 ASSESSMENT — ENCOUNTER SYMPTOMS
CONSTITUTIONAL NEGATIVE: 1
NECK PAIN: 0
FEVER: 0
NECK STIFFNESS: 0
CARDIOVASCULAR NEGATIVE: 1
WHEEZING: 0
FATIGUE: 0
SHORTNESS OF BREATH: 0
RESPIRATORY NEGATIVE: 1
COUGH: 0
COLOR CHANGE: 0
ARTHRALGIAS: 1
WOUND: 0
PALPITATIONS: 0
MYALGIAS: 1
CHILLS: 0
JOINT SWELLING: 0
BACK PAIN: 1

## 2022-06-27 NOTE — LETTER
[facility] [address] [address]    2022    Re: Nancy Bejarano  [address] [address]  [phone] (home)     : 1960      To Whom It May Concern:      Nancy Bejarano was seen in clinic today and I would recommend no heavy lifting greater than 5lbs for the next 1week.  No frequent bending, twisting or use of the low back.  Please feel free to contact me via phone if you have any questions or concerns.        Sincerely,      Delaney See EDITA Aden

## 2022-06-27 NOTE — PROGRESS NOTES
Subjective   Nancy is a 62 year old, presenting for the following health issues:  Hip Pain (Pt having pain in  from hip down to calf ankle, hurts to stand up and walk, pt having a lot of pain off and on. Pain going on for a month, if she sit for awhile it is hard for her to get up. )    HPI   Back Pain  Onset/Duration: 1month  Description:   Location of pain: low back, bilateral  Character of pain: dull ache  Pain radiation: Yes, into her bilateral legs  New numbness or weakness in legs, not attributed to pain: some numbness, tingling but no weakness.  No bladder or bowel dysfunction.  No swelling, redness, drainage or fevers.    Intensity: moderate  Progression of Symptoms: intermittent  History:   Specific cause: lifting at work for [employer]  Pain interferes with job: Yes  History of back problems: no prior back problems  Any previous MRI or X-rays: None  Sees a specialist for back pain: No  Alleviating factors:   Improved by: rest, sitting    Precipitating factors:  Worsened by: Lifting, Bending, Standing, Lying Flat, Walking and Coughing  Therapies tried and outcome: acetaminophen (Tylenol), heat, rest and sitting with minimal relief  Accompanying Signs & Symptoms:  Risk of Fracture: None  Risk of Cauda Equina: None  Risk of Infection: None  Risk of Cancer: None  Risk of Ankylosing Spondylitis: Onset at age <35, male, AND morning back stiffness  no       Patient Active Problem List   Diagnosis     Hypertension goal BP (blood pressure) < 140/90     Hyperlipidemia LDL goal <130     Back pain     GERD (gastroesophageal reflux disease)     Abdominal pain, unspecified abdominal location     Cataracts, both eyes     Posterior vitreous detachment of both eyes     Left knee pain, unspecified chronicity     Overweight     Current Outpatient Medications   Medication     amLODIPine (NORVASC) 5 MG tablet     atenolol (TENORMIN) 50 MG tablet     ibuprofen (ADVIL/MOTRIN) 600 MG tablet     methocarbamol (ROBAXIN) 500 MG tablet      predniSONE (DELTASONE) 20 MG tablet     simvastatin (ZOCOR) 20 MG tablet     No current facility-administered medications for this visit.        Allergies   Allergen Reactions     Asa [Dihydroxyaluminum Aminoacetate] Other (See Comments)     Epigastric   pain     Review of Systems   Constitutional: Negative.  Negative for chills, fatigue and fever.   Respiratory: Negative.  Negative for cough, shortness of breath and wheezing.    Cardiovascular: Negative.  Negative for chest pain, palpitations and peripheral edema.   Genitourinary: Negative.    Musculoskeletal: Positive for arthralgias, back pain and myalgias. Negative for gait problem, joint swelling, neck pain and neck stiffness.   Skin: Negative for color change, pallor, rash and wound.   All other systems reviewed and are negative.           Objective    BP (!) 165/80 (BP Location: Left arm, Patient Position: Sitting, Cuff Size: Adult Regular)   Pulse 81   Temp 98.1  F (36.7  C) (Axillary)   Wt 77.4 kg (170 lb 9.6 oz)   SpO2 99%   BMI 30.22 kg/m    Body mass index is 30.22 kg/m .  Physical Exam  Vitals and nursing note reviewed.   Constitutional:       General: She is not in acute distress.     Appearance: Normal appearance. She is obese. She is not ill-appearing.   Cardiovascular:      Rate and Rhythm: Normal rate and regular rhythm.      Pulses: Normal pulses.      Heart sounds: Normal heart sounds, S1 normal and S2 normal. No murmur heard.    No friction rub. No gallop.   Pulmonary:      Effort: Pulmonary effort is normal. No accessory muscle usage, respiratory distress or retractions.      Breath sounds: Normal breath sounds. No decreased breath sounds, wheezing, rhonchi or rales.   Abdominal:      General: Abdomen is flat. Bowel sounds are normal.      Palpations: Abdomen is soft. There is no mass.      Tenderness: There is no abdominal tenderness. There is no right CVA tenderness, left CVA tenderness, guarding or rebound. Negative signs include  Freeman's sign, Rovsing's sign and McBurney's sign.      Hernia: No hernia is present.   Musculoskeletal:      Lumbar back: Spasms, tenderness and bony tenderness present. No swelling, edema, deformity, signs of trauma or lacerations. Decreased range of motion. Positive right straight leg raise test and positive left straight leg raise test. No scoliosis.   Skin:     General: Skin is warm and dry.      Comments: Distal pulses are 2+ and symmetric.  No peripheral edema.   Neurological:      General: No focal deficit present.      Mental Status: She is alert and oriented to person, place, and time.      Sensory: Sensation is intact.      Motor: Motor function is intact.      Gait: Gait is intact. Gait normal.      Deep Tendon Reflexes: Reflexes are normal and symmetric.   Psychiatric:         Mood and Affect: Mood normal.         Behavior: Behavior normal.         Thought Content: Thought content normal.         Judgment: Judgment normal.              Assessment/Plan:  Acute bilateral low back pain with bilateral sciatica:  ?radicular pain vs pinched nerve vs disk herniation vs strain/sprain/spasm.  Recommend RICE and will give [id number], methocarbamol and ibuprofen prn pain. Discussed risks and benefits of medication along with side effects, direction for use.  No driving or operating machinery due to sedation.  Will also send to orthopedics for further evaluation and management.  Recheck in clinic if symptoms worsen or if symptoms do not improve.   -     ibuprofen (ADVIL/MOTRIN) 600 MG tablet; Take 1 tablet (600 mg) by mouth every 6 hours as needed for moderate pain  -     predniSONE (DELTASONE) 20 MG tablet; Take 1 tablet (20 mg) by mouth 2 times daily for 7 days  -     methocarbamol (ROBAXIN) 500 MG tablet; Take 1 tablet (500 mg) by mouth 4 times daily as needed for muscle spasms  -     Orthopedic  Referral    Leg pain, bilateral        Delaney See EDITA Aden.

## 2022-09-07 ENCOUNTER — OFFICE VISIT (OUTPATIENT)
Dept: URGENT CARE | Facility: URGENT CARE | Age: 62
End: 2022-09-07
Payer: COMMERCIAL

## 2022-09-07 VITALS
OXYGEN SATURATION: 99 % | DIASTOLIC BLOOD PRESSURE: 80 MMHG | BODY MASS INDEX: 29.48 KG/M2 | SYSTOLIC BLOOD PRESSURE: 120 MMHG | HEART RATE: 84 BPM | WEIGHT: 166.4 LBS | TEMPERATURE: 98.1 F

## 2022-09-07 DIAGNOSIS — M25.562 ACUTE PAIN OF BOTH KNEES: Primary | ICD-10-CM

## 2022-09-07 DIAGNOSIS — I10 HYPERTENSION GOAL BP (BLOOD PRESSURE) < 140/90: ICD-10-CM

## 2022-09-07 DIAGNOSIS — M25.561 ACUTE PAIN OF BOTH KNEES: Primary | ICD-10-CM

## 2022-09-07 DIAGNOSIS — J06.9 VIRAL UPPER RESPIRATORY TRACT INFECTION WITH COUGH: ICD-10-CM

## 2022-09-07 DIAGNOSIS — R05.9 COUGH: ICD-10-CM

## 2022-09-07 PROCEDURE — U0005 INFEC AGEN DETEC AMPLI PROBE: HCPCS | Performed by: PHYSICIAN ASSISTANT

## 2022-09-07 PROCEDURE — U0003 INFECTIOUS AGENT DETECTION BY NUCLEIC ACID (DNA OR RNA); SEVERE ACUTE RESPIRATORY SYNDROME CORONAVIRUS 2 (SARS-COV-2) (CORONAVIRUS DISEASE [COVID-19]), AMPLIFIED PROBE TECHNIQUE, MAKING USE OF HIGH THROUGHPUT TECHNOLOGIES AS DESCRIBED BY CMS-2020-01-R: HCPCS | Performed by: PHYSICIAN ASSISTANT

## 2022-09-07 PROCEDURE — 99213 OFFICE O/P EST LOW 20 MIN: CPT | Mod: CS | Performed by: PHYSICIAN ASSISTANT

## 2022-09-07 ASSESSMENT — ENCOUNTER SYMPTOMS
DIARRHEA: 0
WEAKNESS: 0
WOUND: 0
CHILLS: 0
BACK PAIN: 0
ALLERGIC/IMMUNOLOGIC NEGATIVE: 1
SORE THROAT: 0
ENDOCRINE NEGATIVE: 1
LIGHT-HEADEDNESS: 0
VOMITING: 0
NECK STIFFNESS: 0
MYALGIAS: 0
RHINORRHEA: 0
HEADACHES: 0
PALPITATIONS: 0
JOINT SWELLING: 0
COUGH: 1
ARTHRALGIAS: 1
NAUSEA: 0
SHORTNESS OF BREATH: 0
FEVER: 0
CARDIOVASCULAR NEGATIVE: 1
DIZZINESS: 0
NECK PAIN: 0
EYES NEGATIVE: 1

## 2022-09-07 NOTE — PROGRESS NOTES
Chief Complaint:     Chief Complaint   Patient presents with     Knee Pain     Pt complains of bilateral knee pain, no known injury. Onset- 1 week      Cough     Coughing, pain in back from coughing. Onset- Started this week.      Hip Pain      ASSESSMENT     1. Acute pain of both knees    2. Cough    3. Viral upper respiratory tract infection with cough    4. Hypertension goal BP (blood pressure) < 140/90         PLAN    XR of the bilateral knees was negative for any acute fracture per my read.  RICE discussed  Recommended rest and avoidance of activities which cause pain or swelling.  Pain relief: Acetaminophen.  Order placed for ortho follow up for further evaluation and possible injection or PT.  COVID swab collected in clinic today.  Vaporizer, fluids.  OTC cough medicine PRN.  Patient is hypertensive in clinic today.  Second BP reading was at 120/80.  Patient instructed to follow up with PCP in the next week for BP recheck if BP remains above goal.  Sooner if symptoms worsen.  Patient verbalized understanding, and agrees with this plan.       HPI: Nancy Bejarano is an 62 year old female who presents today for evaluation of bilateral knee pain.  She does not recall any acute injury.  Symptoms started roughly 1 week ago and have not changed.  The pain is worse with weight bearing and walking.  She has not tried anything for the symptoms.  She is able to walk.      Patient denies any numbness, tingling, or dysfunction of the the legs.    She has also had a non productive cough for the past several days.  No fever, chills, chest pain or SOB.        ROS:      Review of Systems   Constitutional: Negative for chills and fever.   HENT: Positive for congestion. Negative for ear pain, rhinorrhea and sore throat.    Eyes: Negative.    Respiratory: Positive for cough. Negative for shortness of breath.    Cardiovascular: Negative.  Negative for chest pain and palpitations.   Gastrointestinal: Negative for diarrhea, nausea and  vomiting.   Endocrine: Negative.    Genitourinary: Negative.    Musculoskeletal: Positive for arthralgias. Negative for back pain, joint swelling, myalgias, neck pain and neck stiffness.   Skin: Negative.  Negative for rash and wound.   Allergic/Immunologic: Negative.  Negative for immunocompromised state.   Neurological: Negative for dizziness, weakness, light-headedness and headaches.        Problem history  Patient Active Problem List   Diagnosis     Hypertension goal BP (blood pressure) < 140/90     Hyperlipidemia LDL goal <130     Back pain     GERD (gastroesophageal reflux disease)     Abdominal pain, unspecified abdominal location     Cataracts, both eyes     Posterior vitreous detachment of both eyes     Left knee pain, unspecified chronicity     Overweight        Allergies  Allergies   Allergen Reactions     Asa [Dihydroxyaluminum Aminoacetate] Other (See Comments)     Epigastric   pain        Smoking History  History   Smoking Status     Never Smoker   Smokeless Tobacco     Never Used     Comment: lives in smoke free household.        Current Meds    Current Outpatient Medications:      amLODIPine (NORVASC) 5 MG tablet, Take 1 tablet (5 mg) by mouth daily, Disp: 90 tablet, Rfl: 1     atenolol (TENORMIN) 50 MG tablet, TAKE 1 TABLET(50 MG) BY MOUTH DAILY, Disp: 90 tablet, Rfl: 1     ibuprofen (ADVIL/MOTRIN) 600 MG tablet, Take 1 tablet (600 mg) by mouth every 6 hours as needed for moderate pain, Disp: 30 tablet, Rfl: 0     simvastatin (ZOCOR) 20 MG tablet, Take 1 tablet (20 mg) by mouth At Bedtime, Disp: 90 tablet, Rfl: 3     methocarbamol (ROBAXIN) 500 MG tablet, Take 1 tablet (500 mg) by mouth 4 times daily as needed for muscle spasms (Patient not taking: Reported on 9/7/2022), Disp: 30 tablet, Rfl: 0        Vital signs reviewed by Tyrell Pena PA-C  /80 (BP Location: Left arm, Patient Position: Sitting, Cuff Size: Adult Regular)   Pulse 84   Temp 98.1  F (36.7  C) (Tympanic)   Wt 75.5 kg (166  lb 6.4 oz)   SpO2 99%   BMI 29.48 kg/m      Physical Exam     Physical Exam  Vitals and nursing note reviewed.   Constitutional:       General: She is not in acute distress.     Appearance: She is well-developed. She is not ill-appearing, toxic-appearing or diaphoretic.   HENT:      Head: Normocephalic and atraumatic.      Right Ear: Tympanic membrane and external ear normal. No drainage, swelling or tenderness. Tympanic membrane is not perforated, erythematous, retracted or bulging.      Left Ear: Tympanic membrane and external ear normal. No drainage, swelling or tenderness. Tympanic membrane is not perforated, erythematous, retracted or bulging.      Nose: No mucosal edema, congestion or rhinorrhea.      Right Sinus: No maxillary sinus tenderness or frontal sinus tenderness.      Left Sinus: No maxillary sinus tenderness or frontal sinus tenderness.      Mouth/Throat:      Pharynx: No pharyngeal swelling, oropharyngeal exudate, posterior oropharyngeal erythema or uvula swelling.      Tonsils: No tonsillar abscesses.   Eyes:      Pupils: Pupils are equal, round, and reactive to light.   Neck:      Trachea: Trachea normal.   Cardiovascular:      Rate and Rhythm: Normal rate and regular rhythm.      Heart sounds: Normal heart sounds, S1 normal and S2 normal. No murmur heard.    No friction rub. No gallop.   Pulmonary:      Effort: Pulmonary effort is normal. No respiratory distress.      Breath sounds: Normal breath sounds. No decreased breath sounds, wheezing, rhonchi or rales.   Abdominal:      General: Bowel sounds are normal. There is no distension.      Palpations: Abdomen is soft. Abdomen is not rigid. There is no mass.      Tenderness: There is no abdominal tenderness. There is no guarding or rebound.   Musculoskeletal:      Cervical back: Full passive range of motion without pain, normal range of motion and neck supple.      Right knee: No swelling, deformity, effusion, erythema, bony tenderness or  crepitus. Normal range of motion. Tenderness present over the medial joint line. No lateral joint line, MCL, LCL or ACL tenderness. No LCL laxity, MCL laxity or ACL laxity. Normal alignment and normal patellar mobility.      Instability Tests: Anterior drawer test negative. Posterior drawer test negative. Anterior Lachman test negative.      Left knee: No swelling, deformity, effusion, erythema, bony tenderness or crepitus. Normal range of motion. Tenderness present over the medial joint line. No lateral joint line, MCL, LCL or ACL tenderness. No LCL laxity, MCL laxity or ACL laxity.Normal alignment and normal patellar mobility.      Instability Tests: Anterior drawer test negative. Posterior drawer test negative. Anterior Lachman test negative.   Lymphadenopathy:      Cervical: No cervical adenopathy.   Skin:     General: Skin is warm and dry.   Neurological:      Mental Status: She is alert and oriented to person, place, and time.      Cranial Nerves: No cranial nerve deficit.      Deep Tendon Reflexes: Reflexes are normal and symmetric.   Psychiatric:         Behavior: Behavior normal. Behavior is cooperative.         Thought Content: Thought content normal.         Judgment: Judgment normal.             Tyrell Pena PA-C  9/7/2022, 12:38 PM

## 2022-09-07 NOTE — LETTER
[facility] URGENT [facility]ARE [address] [address]          September 7, 2022    RE:  Nancy Bejarano                                                                                                                                                       [address] [address]            To whom it may concern:    Nancy Bejarano is under my professional care for bilateral knee pain.  She  Will be unable to work until cleared by orthopedic specialist.    Sincerely,        Tyrell Pena PA-C

## 2022-09-08 LAB — SARS-COV-2 RNA RESP QL NAA+PROBE: NEGATIVE

## 2022-09-08 NOTE — TELEPHONE ENCOUNTER
DIAGNOSIS: Acute pain of both knees    APPOINTMENT DATE: 09/14/2022   NOTES STATUS DETAILS   OFFICE NOTE from referring provider Internal 09/07/2022 Dr Pena [facility] urgent care   OFFICE NOTE from other specialist Internal 08/03/2022 PT [facility]   05/27/2021 Dr Caldwell [facility]    DISCHARGE SUMMARY from hospital N/A    DISCHARGE REPORT from the ER N/A    OPERATIVE REPORT N/A    EMG report N/A    MEDICATION LIST N/A    MRI N/A    DEXA (osteoporosis/bone health) N/A    CT SCAN N/A    XRAYS (IMAGES & REPORTS) Internal 09/07/2022 bilateral knees  01/25/2019 bilateral knees

## 2022-09-14 ENCOUNTER — OFFICE VISIT (OUTPATIENT)
Dept: ORTHOPEDICS | Facility: CLINIC | Age: 62
End: 2022-09-14
Attending: PHYSICIAN ASSISTANT
Payer: COMMERCIAL

## 2022-09-14 ENCOUNTER — PRE VISIT (OUTPATIENT)
Dept: ORTHOPEDICS | Facility: CLINIC | Age: 62
End: 2022-09-14

## 2022-09-14 ENCOUNTER — TELEPHONE (OUTPATIENT)
Dept: ORTHOPEDICS | Facility: CLINIC | Age: 62
End: 2022-09-14

## 2022-09-14 DIAGNOSIS — M54.16 LUMBAR BACK PAIN WITH RADICULOPATHY AFFECTING LOWER EXTREMITY: Primary | ICD-10-CM

## 2022-09-14 DIAGNOSIS — M25.561 ACUTE PAIN OF BOTH KNEES: ICD-10-CM

## 2022-09-14 DIAGNOSIS — M25.562 ACUTE PAIN OF BOTH KNEES: ICD-10-CM

## 2022-09-14 PROCEDURE — 99204 OFFICE O/P NEW MOD 45 MIN: CPT | Performed by: FAMILY MEDICINE

## 2022-09-14 RX ORDER — PREDNISONE 20 MG/1
40 TABLET ORAL DAILY
Qty: 10 TABLET | Refills: 0 | Status: SHIPPED | OUTPATIENT
Start: 2022-09-14 | End: 2022-09-19

## 2022-09-14 RX ORDER — TIZANIDINE 2 MG/1
2-4 TABLET ORAL 3 TIMES DAILY
Qty: 30 TABLET | Refills: 1 | Status: SHIPPED | OUTPATIENT
Start: 2022-09-14 | End: 2023-04-17

## 2022-09-14 NOTE — LETTER
9/14/2022         RE: Nancy Bejarano  [address]  [place] [address]        Dear Colleague,    Thank you for referring your patient, Nancy Bejarano, to the [facility] [place]. Please see a copy of my visit note below.      [facility]  SPORTS MEDICINE CLINIC VISIT     Sep 14, 2022        ASSESSMENT & PLAN    62-year-old with bilateral lower extremity pain that seems consistent with radiculopathy after exam.    Reviewed imaging and assessment with patient in detail  Take prednisone in the morning with food for 5 days  Take tizanidine up to three times daily for spasm  Perform home exercises as tolerated  Follow up with physical therapy  Follow up in clinic if worsening symptoms such as numbness, weakness, or if there is no improvement after 6 weeks.     Akash Goddard MD  [facility]IC[facility]    -----  Chief Complaint   Patient presents with     Consult For     Bilateral knee pain        SUBJECTIVE  Nancy Bejarano is a/an 62 year old female who is seen in consultation at the request of  Tyrell Pena PA-C for evaluation of bilateral knee pain - has cramping from thighs to calves.     The patient is seen with their daughter.  The patient is Right handed    Onset: 2 week(s) ago. Reports insidious onset without acute precipitating event.  Location of Pain: bilateral knee  Worsened by: After sitting for a long of period of time  Better with: Ointment at home that helps the cramping  Treatments tried: ointment that she rubs on the knees  Associated symptoms: no distal numbness or tingling; denies swelling or warmth    Orthopedic/Surgical history: NO  Social History/Occupation: [employer]      REVIEW OF SYSTEMS:    Do you have fever, chills, weight loss? No    Do you have any vision problems? No    Do you have any chest pain or edema? No    Do you have any shortness of breath or wheezing?  No    Do you have stomach problems? No    Do  you have any numbness or focal weakness? No    Do you have diabetes? No    Do you have problems with bleeding or clotting? No    Do you have an rashes or other skin lesions? No    OBJECTIVE:  There were no vitals taken for this visit.     General: alert, pleasant, no distress. sitting comfortably in chair  Back/Spine: no tenderness to palpation of spinous processes, or paraspinous musculature of lumbar spine. full ROM with flexion, extension, twisting, and side bending without pain.  Has pain with slump test bilaterally.  Mild hamstring tightness noted. no pain in back with RANDELL testing bilaterally  Neuro: SILT on bilateral lower extremities, can stand on toes and heel walk without difficulty,       bilateral knee:   Skin intact. No erythema or ecchymosis.  No effusion or soft tissue swelling.    AROM: Zero to approximately 135  without restriction or reported pain.    Palpation: No medial or lateral facet joint tenderness.  No posterior medial or posterior lateral joint line tenderness     Special Tests:  Negative bounce test, negative forced flexion and negative Amalia's.  No ligamentous laxity or pain with valgus or varus stress.  Negative Lachman's, Anterior Drawer and Posterior Drawer       RADIOLOGY:    3 view xrays of bilateral knee performed 9/7/2022 and reviewed independently demonstrating no acute fracture dislocation.  Mild bilateral DJD in the medial and patellofemoral compartments. See EMR for formal radiology report.     Three-view x-rays of the lumbar spine performed reviewed independently demonstrating mild multilevel disc disease with mild anterolisthesis of L3 on L4 and more prominent disc loss at L4-5 and L5-S1.  Significant lower lumbar facet arthropathy is noted.  See EMR for formal radiology report            Again, thank you for allowing me to participate in the care of your patient.        Sincerely,        Akash Goddard MD

## 2022-09-14 NOTE — LETTER
September 14, 2022      RE: Nancy Bejarano  [address] [address]        To whom it may concern:    Nancy Bejarano is under my professional care. Please excuse patient from work today due to a doctors appointment.     Sincerely,      Akash Goddard MD

## 2022-09-14 NOTE — TELEPHONE ENCOUNTER
Left Voicemail (1st Attempt) for the patient to call back and schedule the following:    Appointment type: physical therapy eval   Specialty phone number: [phone]      Nila stephens Procedure   Orthopedics, Podiatry, Sports Medicine, ENT/Eye Specialties  [facility] and [facility]   [phone]

## 2022-09-14 NOTE — PATIENT INSTRUCTIONS
Take prednisone in the morning with food for 5 days  Do not take ibuprofen, naproxen while taking prednisone  Take tizanidine up to three times daily for spasm  Perform home exercises as tolerated  Follow up with physical therapy  Follow up in clinic if worsening symptoms such as numbness, weakness, or if there is no improvement after 6 weeks.

## 2022-09-14 NOTE — PROGRESS NOTES
[facility]  SPORTS MEDICINE CLINIC VISIT     Sep 14, 2022        ASSESSMENT & PLAN    62-year-old with bilateral lower extremity pain that seems consistent with radiculopathy after exam.    Reviewed imaging and assessment with patient in detail  Take prednisone in the morning with food for 5 days  Take tizanidine up to three times daily for spasm  Perform home exercises as tolerated  Follow up with physical therapy  Follow up in clinic if worsening symptoms such as numbness, weakness, or if there is no improvement after 6 weeks.     Akash Goddard MD  [facility] SPORTS MEDICINE [facility]    -----  Chief Complaint   Patient presents with     Consult For     Bilateral knee pain        SUBJECTIVE  Nancy Bejarano is a/an 62 year old female who is seen in consultation at the request of  Tyrell Pena PA-C for evaluation of bilateral knee pain - has cramping from thighs to calves.     The patient is seen with their daughter.  The patient is Right handed    Onset: 2 week(s) ago. Reports insidious onset without acute precipitating event.  Location of Pain: bilateral knee  Worsened by: After sitting for a long of period of time  Better with: Ointment at home that helps the cramping  Treatments tried: ointment that she rubs on the knees  Associated symptoms: no distal numbness or tingling; denies swelling or warmth    Orthopedic/Surgical history: NO  Social History/Occupation: [employer]      REVIEW OF SYSTEMS:    Do you have fever, chills, weight loss? No    Do you have any vision problems? No    Do you have any chest pain or edema? No    Do you have any shortness of breath or wheezing?  No    Do you have stomach problems? No    Do you have any numbness or focal weakness? No    Do you have diabetes? No    Do you have problems with bleeding or clotting? No    Do you have an rashes or other skin lesions? No    OBJECTIVE:  There were no vitals taken for this visit.     General: alert, pleasant,  no distress. sitting comfortably in chair  Back/Spine: no tenderness to palpation of spinous processes, or paraspinous musculature of lumbar spine. full ROM with flexion, extension, twisting, and side bending without pain.  Has pain with slump test bilaterally.  Mild hamstring tightness noted. no pain in back with RANDELL testing bilaterally  Neuro: SILT on bilateral lower extremities, can stand on toes and heel walk without difficulty,       bilateral knee:   Skin intact. No erythema or ecchymosis.  No effusion or soft tissue swelling.    AROM: Zero to approximately 135  without restriction or reported pain.    Palpation: No medial or lateral facet joint tenderness.  No posterior medial or posterior lateral joint line tenderness     Special Tests:  Negative bounce test, negative forced flexion and negative Amalia's.  No ligamentous laxity or pain with valgus or varus stress.  Negative Lachman's, Anterior Drawer and Posterior Drawer       RADIOLOGY:    3 view xrays of bilateral knee performed 9/7/2022 and reviewed independently demonstrating no acute fracture dislocation.  Mild bilateral DJD in the medial and patellofemoral compartments. See EMR for formal radiology report.     Three-view x-rays of the lumbar spine performed reviewed independently demonstrating mild multilevel disc disease with mild anterolisthesis of L3 on L4 and more prominent disc loss at L4-5 and L5-S1.  Significant lower lumbar facet arthropathy is noted.  See EMR for formal radiology report

## 2022-09-20 NOTE — TELEPHONE ENCOUNTER
Left Voicemail (2nd Attempt) for the patient to call back and schedule the following:    Appointment type: physical therapy eval   Specialty phone number: [phone]      Nila stephens Procedure   Orthopedics, Podiatry, Sports Medicine, ENT/Eye Specialties  [facility]   [phone]

## 2023-04-14 ENCOUNTER — NURSE TRIAGE (OUTPATIENT)
Dept: FAMILY MEDICINE | Facility: CLINIC | Age: 63
End: 2023-04-14

## 2023-04-14 NOTE — TELEPHONE ENCOUNTER
"Patient spouse calling in to explain \"some symptoms\" that patient is having. Spouse very difficult to hear over the phone, very muffled and echoed. Spouse states no issue hearing writer, asked spouse if writer could call back to see if that is any better and spouse declined this. Informed spouse that there is no CTC for him on file, spouse gave phone to patient who did give verbal CTC with spouse re: health care (writer also notes very difficult to hear/understand patient, but able to hear clear CTC).     Spouse states that patient was seen recently in ED for PNA and cough, was prescribed antibiotics and cough medicine. Spouse states patient is having \"cold-like symptoms for a week.\" Writer did have to ask spouse many questions about symptoms, as he did not give much information and needed prompting. Spouse states patient not having fever now but \"thought she had one yesterday, but doesn't know what it was\", cough, and generally not feeling well. No N/V/diarrhea, no SOB or chest pain. Writer asked if patient was improving since her visit to ED, spouse states she has been improving and doing better. Spouse unsure if these symptoms are lingering from PNA or not.     Spouse then stated that the main reason they are calling is due to bilateral hip pain. Spouse states pain is 6/10 for bilateral hips and has been present for 1 week. Spouse then backtracked and stated pain has been present since before patient went to ED 1 month ago. Pain sometimes radiates to legs, no numbness/tingling in feet/legs. No recent injury, however spouse states that patient had accident 3 years ago that involved her hips. Spouse states that patient wants to be seen in the next week.     Recommended in-person appt to assess hip pain, appt made for this upcoming Monday. Spouse and parent aware of check-in time. ED precautions reviewed. No further questions or concerns.      Corry Mg, BLAINEN, RN  [facility]      Reason " "for Disposition    MODERATE pain (e.g., interferes with normal activities, limping) and present > 3 days    Additional Information    Negative: Looks like a broken bone or dislocated joint (e.g., crooked or deformed)    Negative: Sounds like a life-threatening emergency to the triager    Negative: Followed a hip injury    Negative: Leg pain is main symptom    Negative: Back pain radiating (shooting) into hip    Negative: SEVERE pain (e.g., excruciating, unable to do any normal activities) and fever    Negative: Can't stand (bear weight) or walk    Negative: Fever and red area (or area very tender to touch)    Negative: Patient sounds very sick or weak to the triager    Negative: SEVERE pain (e.g., excruciating, unable to do any normal activities)    Negative: Red area or streak > 2 inches (or 5 cm)    Negative: Painful rash with multiple small blisters grouped together (i.e., dermatomal distribution or 'band' or 'stripe')    Negative: Looks like a boil, infected sore, deep ulcer, or other infected rash (spreading redness, pus)    Negative: Localized rash is very painful (no fever)    Negative: Numbness in a leg or foot (i.e., loss of sensation)    Negative: Patient wants to be seen    Answer Assessment - Initial Assessment Questions  1. LOCATION and RADIATION: \"Where is the pain located?\"       Bilateral hips  2. QUALITY: \"What does the pain feel like?\"  (e.g., sharp, dull, aching, burning)      Aching  3. SEVERITY: \"How bad is the pain?\" \"What does it keep you from doing?\"   (Scale 1-10; or mild, moderate, severe)    -  MILD (1-3): doesn't interfere with normal activities     -  MODERATE (4-7): interferes with normal activities (e.g., work or school) or awakens from sleep, limping     -  SEVERE (8-10): excruciating pain, unable to do any normal activities, unable to walk      6/10  4. ONSET: \"When did the pain start?\" \"Does it come and go, or is it there all the time?\"      4 weeks ago, comes and goes  5. WORK OR " "EXERCISE: \"Has there been any recent work or exercise that involved this part of the body?\"      Not sure  6. CAUSE: \"What do you think is causing the hip pain?\"       Not sure  7. AGGRAVATING FACTORS: \"What makes the hip pain worse?\" (e.g., walking, climbing stairs, running)      Moving  8. OTHER SYMPTOMS: \"Do you have any other symptoms?\" (e.g., back pain, pain shooting down leg,  fever, rash)      Pain shoots down both legs intermittently    Protocols used: HIP PAIN-A-OH      "

## 2023-04-17 ENCOUNTER — OFFICE VISIT (OUTPATIENT)
Dept: FAMILY MEDICINE | Facility: CLINIC | Age: 63
End: 2023-04-17
Payer: COMMERCIAL

## 2023-04-17 ENCOUNTER — ANCILLARY PROCEDURE (OUTPATIENT)
Dept: GENERAL RADIOLOGY | Facility: CLINIC | Age: 63
End: 2023-04-17
Attending: PHYSICIAN ASSISTANT
Payer: COMMERCIAL

## 2023-04-17 VITALS
HEART RATE: 102 BPM | SYSTOLIC BLOOD PRESSURE: 180 MMHG | RESPIRATION RATE: 20 BRPM | BODY MASS INDEX: 30.65 KG/M2 | DIASTOLIC BLOOD PRESSURE: 94 MMHG | WEIGHT: 173 LBS | HEIGHT: 63 IN | OXYGEN SATURATION: 100 % | TEMPERATURE: 98.7 F

## 2023-04-17 DIAGNOSIS — Z12.4 CERVICAL CANCER SCREENING: ICD-10-CM

## 2023-04-17 DIAGNOSIS — M54.50 CHRONIC BILATERAL LOW BACK PAIN WITHOUT SCIATICA: Primary | ICD-10-CM

## 2023-04-17 DIAGNOSIS — R14.3 FLATULENCE, ERUCTATION AND GAS PAIN: ICD-10-CM

## 2023-04-17 DIAGNOSIS — M54.41 ACUTE BILATERAL LOW BACK PAIN WITH BILATERAL SCIATICA: ICD-10-CM

## 2023-04-17 DIAGNOSIS — E78.5 HYPERLIPIDEMIA LDL GOAL <130: ICD-10-CM

## 2023-04-17 DIAGNOSIS — I10 HYPERTENSION GOAL BP (BLOOD PRESSURE) < 140/90: ICD-10-CM

## 2023-04-17 DIAGNOSIS — Z87.01 HISTORY OF COMMUNITY ACQUIRED PNEUMONIA: ICD-10-CM

## 2023-04-17 DIAGNOSIS — R14.2 FLATULENCE, ERUCTATION AND GAS PAIN: ICD-10-CM

## 2023-04-17 DIAGNOSIS — Z12.31 VISIT FOR SCREENING MAMMOGRAM: ICD-10-CM

## 2023-04-17 DIAGNOSIS — G89.29 CHRONIC BILATERAL LOW BACK PAIN WITHOUT SCIATICA: Primary | ICD-10-CM

## 2023-04-17 DIAGNOSIS — R14.1 FLATULENCE, ERUCTATION AND GAS PAIN: ICD-10-CM

## 2023-04-17 DIAGNOSIS — M54.42 ACUTE BILATERAL LOW BACK PAIN WITH BILATERAL SCIATICA: ICD-10-CM

## 2023-04-17 DIAGNOSIS — Z12.31 ENCOUNTER FOR SCREENING MAMMOGRAM FOR BREAST CANCER: ICD-10-CM

## 2023-04-17 DIAGNOSIS — Z12.11 SCREEN FOR COLON CANCER: ICD-10-CM

## 2023-04-17 LAB
CHOLEST SERPL-MCNC: 220 MG/DL
FASTING STATUS PATIENT QL REPORTED: NO
HDLC SERPL-MCNC: 63 MG/DL
LDLC SERPL CALC-MCNC: 147 MG/DL
NONHDLC SERPL-MCNC: 157 MG/DL
TRIGL SERPL-MCNC: 51 MG/DL

## 2023-04-17 PROCEDURE — 71046 X-RAY EXAM CHEST 2 VIEWS: CPT | Mod: TC | Performed by: RADIOLOGY

## 2023-04-17 PROCEDURE — 99214 OFFICE O/P EST MOD 30 MIN: CPT | Performed by: PHYSICIAN ASSISTANT

## 2023-04-17 PROCEDURE — 36415 COLL VENOUS BLD VENIPUNCTURE: CPT | Performed by: PHYSICIAN ASSISTANT

## 2023-04-17 PROCEDURE — 80061 LIPID PANEL: CPT | Performed by: PHYSICIAN ASSISTANT

## 2023-04-17 RX ORDER — METHOCARBAMOL 500 MG/1
500 TABLET, FILM COATED ORAL 4 TIMES DAILY PRN
Qty: 30 TABLET | Refills: 1 | Status: SHIPPED | OUTPATIENT
Start: 2023-04-17

## 2023-04-17 RX ORDER — SIMETHICONE 125 MG
125 TABLET,CHEWABLE ORAL 4 TIMES DAILY PRN
Qty: 60 TABLET | Refills: 1 | Status: SHIPPED | OUTPATIENT
Start: 2023-04-17

## 2023-04-17 RX ORDER — SIMVASTATIN 20 MG
20 TABLET ORAL AT BEDTIME
Qty: 90 TABLET | Refills: 3 | Status: SHIPPED | OUTPATIENT
Start: 2023-04-17

## 2023-04-17 RX ORDER — ATENOLOL 50 MG/1
50 TABLET ORAL DAILY
Qty: 90 TABLET | Refills: 3 | Status: SHIPPED | OUTPATIENT
Start: 2023-04-17 | End: 2024-07-11

## 2023-04-17 RX ORDER — AMLODIPINE BESYLATE 10 MG/1
10 TABLET ORAL DAILY
Qty: 90 TABLET | Refills: 3 | Status: SHIPPED | OUTPATIENT
Start: 2023-04-17 | End: 2024-07-11

## 2023-04-17 RX ORDER — ACETAMINOPHEN 500 MG
500-1000 TABLET ORAL EVERY 6 HOURS PRN
Qty: 30 TABLET | Refills: 3 | Status: SHIPPED | OUTPATIENT
Start: 2023-04-17 | End: 2024-02-26

## 2023-04-17 ASSESSMENT — ENCOUNTER SYMPTOMS: HIP PAIN: 1

## 2023-04-17 ASSESSMENT — PAIN SCALES - GENERAL: PAINLEVEL: SEVERE PAIN (6)

## 2023-04-17 NOTE — LETTER
April 19, 2023      Nancy Bejarano  [address]  [place] [address]        Dear ,    We are writing to inform you of your test results.    Cholesterol is stable.     Resulted Orders   Lipid Profile (Chol, Trig, HDL, LDL calc)   Result Value Ref Range    Cholesterol 220 (H) <200 mg/dL    Triglycerides 51 <150 mg/dL    Direct Measure HDL 63 >=50 mg/dL    LDL Cholesterol Calculated 147 (H) <=100 mg/dL    Non HDL Cholesterol 157 (H) <130 mg/dL    Patient Fasting > 8hrs? No     Narrative    Cholesterol  Desirable:  <200 mg/dL    Triglycerides  Normal:  Less than 150 mg/dL  Borderline High:  150-199 mg/dL  High:  200-499 mg/dL  Very High:  Greater than or equal to 500 mg/dL    Direct Measure HDL  Female:  Greater than or equal to 50 mg/dL   Male:  Greater than or equal to 40 mg/dL    LDL Cholesterol  Desirable:  <100mg/dL  Above Desirable:  100-129 mg/dL   Borderline High:  130-159 mg/dL   High:  160-189 mg/dL   Very High:  >= 190 mg/dL    Non HDL Cholesterol  Desirable:  130 mg/dL  Above Desirable:  130-159 mg/dL  Borderline High:  160-189 mg/dL  High:  190-219 mg/dL  Very High:  Greater than or equal to 220 mg/dL       If you have any questions or concerns, please call the clinic at the number listed above.       Sincerely,      Sara Barrera PA-C

## 2023-04-17 NOTE — LETTER
April 18, 2023      aNncy Bejarano  [address]  [place] [address]        Dear ,    We are writing to inform you of your test results.    Chest xray is normal now. Pneumonia has resolved.    Resulted Orders   XR Chest 2 Views    Narrative    CHEST TWO VIEWS  4/17/2023 10:19 AM     HISTORY: 63-year-old woman with history of community-acquired  pneumonia.    COMPARISON: None       Impression    IMPRESSION: Heart size is normal. No pleural effusion, pneumothorax,  or abnormal area of consolidation.    MAGGIE PANIAGUA MD         SYSTEM ID:  [id number]       If you have any questions or concerns, please call the clinic at the number listed above.       Sincerely,      Sara Barrrea PA-C

## 2023-04-17 NOTE — LETTER
April 17, 2023      Nancy Bejarano  [address] [address]        To Whom It May Concern:    Nancy Bejarano  was seen on 4/17/23.  Patient may return to work 4/18/23 with limitations- no lifting more than 10lbs for 3 weeks         Sincerely,        Sara Barrera PA-C

## 2023-04-17 NOTE — PROGRESS NOTES
"  Assessment & Plan   Problem List Items Addressed This Visit        Nervous and Auditory    Back pain - Primary    Relevant Medications    methocarbamol (ROBAXIN) 500 MG tablet    acetaminophen (TYLENOL) 500 MG tablet    Other Relevant Orders    Physical Therapy Referral       Endocrine    Hyperlipidemia LDL goal <130    Relevant Medications    simvastatin (ZOCOR) 20 MG tablet    Other Relevant Orders    Lipid Profile (Chol, Trig, HDL, LDL calc) (Completed)       Circulatory    Hypertension goal BP (blood pressure) < 140/90    Relevant Medications    atenolol (TENORMIN) 50 MG tablet    amLODIPine (NORVASC) 10 MG tablet    Other Relevant Orders    Adult Eye  Referral   Other Visit Diagnoses     Visit for screening mammogram        Relevant Orders    MA SCREENING DIGITAL BILAT - Future  (s+30)    Encounter for screening mammogram for breast cancer        Relevant Orders    MA SCREENING DIGITAL BILAT - Future  (s+30)    Cervical cancer screening        Screen for colon cancer        Relevant Orders    Fecal colorectal cancer screen FIT - Future (S+30)    Flatulence, eructation and gas pain        Relevant Medications    simethicone (MYLICON) 125 MG chewable tablet    History of community acquired pneumonia        Relevant Orders    XR Chest 2 Views (Completed)       increase Amlodipine to 10 mg daily   Continue Atenolol  Please take BP medications daily -stressed importance of regular HTN treatment     Continue simvastatin  Follow up in 1 month        30 minutes spent by me on the date of the encounter doing chart review, history and exam, documentation and further activities per the note       BMI:   Estimated body mass index is 30.84 kg/m  as calculated from the following:    Height as of this encounter: 1.595 m (5' 2.8\").    Weight as of this encounter: 78.5 kg (173 lb).   Weight management plan: Discussed healthy diet and exercise guidelines        Sara Barrera PA-C  [facility] " [place] is a 63 year old, presenting for the following health issues:  Hip Pain        4/17/2023     9:30 AM   Additional Questions   Roomed by Layla   Accompanied by Spouse Immanuel     Hip Pain    History of Present Illness       Back Pain:  She presents for follow up of back pain. Patient's back pain is a new problem.    Original cause of back pain: not sure  First noticed back pain: 1-4 weeks ago  Patient feels back pain: comes and goesLocation of back pain:  Right lower back, left lower back, right hip and left hip  Description of back pain: cramping  Back pain spreads: nowhere    Since patient first noticed back pain, pain is: always present, but gets better and worse  Does back pain interfere with her job:  Not applicable  On a scale of 1-10 (10 being the worst), patient describes pain as:  8  What makes back pain worse: coughing, standing and other  Acupuncture: not tried  Acetaminophen: not tried  Activity or exercise: not tried  Chiropractor:  Not tried  Cold: not tried  Heat: not tried  Massage: not tried  Muscle relaxants: not tried  NSAIDS: not tried  Opioids: not tried  Physical Therapy: not tried  Rest: not tried  Steroid Injection: not tried  Stretching: not tried  Surgery: not tried  TENS unit: not tried  Topical pain relievers: helpful  Other healthcare providers patient is seeing for back pain: None    She eats 4 or more servings of fruits and vegetables daily.She consumes 0 sweetened beverage(s) daily.She exercises with enough effort to increase her heart rate 9 or less minutes per day.  She exercises with enough effort to increase her heart rate 3 or less days per week.   She is taking medications regularly.       ED/UC Followup:    Facility:  [facility]   Date of visit: 3/ 12; 3/13, 3/17  Reason for visit: pneumonia  Current Status: resolved, feels well     Hyperlipidemia Follow-Up      Are you regularly taking any medication or supplement to lower your cholesterol?    "Yes- simvastatin    Are you having muscle aches or other side effects that you think could be caused by your cholesterol lowering medication?  No    Hypertension Follow-up      Do you check your blood pressure regularly outside of the clinic? No     Are you following a low salt diet? No    Are your blood pressures ever more than 140 on the top number (systolic) OR more   than 90 on the bottom number (diastolic), for example 140/90? Yes        Review of Systems         Objective    BP (!) 180/94 (BP Location: Left arm, Patient Position: Sitting, Cuff Size: Adult Regular)   Pulse 102   Temp 98.7  F (37.1  C) (Tympanic)   Resp 20   Ht 1.595 m (5' 2.8\")   Wt 78.5 kg (173 lb)   SpO2 100%   BMI 30.84 kg/m    Body mass index is 30.84 kg/m .  Physical Exam                       "

## 2023-04-17 NOTE — LETTER
April 19, 2023      Nancy Bejarano  [address]  [place] [address]        Dear ,    We are writing to inform you of your test results.    Chest xray is normal now. Pneumonia has resolved    Resulted Orders   XR Chest 2 Views    Narrative    CHEST TWO VIEWS  4/17/2023 10:19 AM     HISTORY: 63-year-old woman with history of community-acquired  pneumonia.    COMPARISON: None       Impression    IMPRESSION: Heart size is normal. No pleural effusion, pneumothorax,  or abnormal area of consolidation.    MAGGIE PANIAGUA MD         SYSTEM ID:  [id number]       If you have any questions or concerns, please call the clinic at the number listed above.       Sincerely,      Sara Barrera PA-C

## 2023-04-17 NOTE — PATIENT INSTRUCTIONS
Take   Amlodipine 10 mg daily and Atenolol 50 mg daily for Blood pressure every days     Tylenol 500 mg every 6 hours as needed for back pain  Methocarbamol 500 mg three times a day as needed back spasms

## 2023-04-18 NOTE — RESULT ENCOUNTER NOTE
Please call the patient with these results:      Chest xray is normal now. Pneumonia has resolved  Sara Barrera PA-C

## 2023-05-10 ENCOUNTER — TELEPHONE (OUTPATIENT)
Dept: FAMILY MEDICINE | Facility: CLINIC | Age: 63
End: 2023-05-10

## 2023-05-10 NOTE — TELEPHONE ENCOUNTER
Patient Quality Outreach    Patient is due for the following:   Colon Cancer Screening  Breast Cancer Screening - Mammogram  Cervical Cancer Screening - PAP Needed  Physical Preventive Adult Physical      Topic Date Due     Zoster (Shingles) Vaccine (1 of 2) Never done     COVID-19 Vaccine (3 - Booster for Pfizer series) 05/11/2022     Flu Vaccine (1) 09/01/2022     Diptheria Tetanus Pertussis (DTAP/TDAP/TD) Vaccine (6 - Td or Tdap) 10/05/2022       Next Steps:   Schedule a lab only visit for mammogram and colonoscopy    Type of outreach:    Sent letter.      Questions for provider review:    None           Inna Cottrell

## 2023-05-10 NOTE — LETTER
May 10, 2023      Nancy Bejarano  [address] [address]      Dear Nancy    At [facility] we care about your health and are committed to providing quality patient care.    We are recommending that you:  Schedule a WELLNESS (Preventative/Physical) APPOINTMENT with your primary care provider. If you go elsewhere for your wellness appointments then please disregard this reminder    ,   Schedule a MAMMOGRAM which is due.    1 in 8 women will develop invasive breast cancer during her lifetime and it is the most common non-skin cancer in American women.  EARLY detection, new treatments, and a better understanding of the disease have increased survival rates - the 5 year survival rate in the 1960s was 63% and today it is close to 90%.    If you are under/uninsured, we recommend you contact the [facility]. They offer mammograms at no charge or on a sliding fee charge. You can schedule with them at [phone]. Please have them send us the results.      Please disregard this reminder if you have had this exam elsewhere within the last year.  It would be helpful for us to have a copy of your mammogram report in your file so that we can best coordinate your care - please contact us with when your test was done so we can update your record.    ,   Schedule a COLONOSCOPY to assess for colon cancer (due every 10 years or 5 years in higher risk situations.)       Colon cancer is now the second leading cause of cancer-related deaths in the [place] for both men and women and there are over 130,000 new cases and 50,000 deaths per year from colon cancer.  Colonoscopies can prevent 90-95% of these deaths.  Problem lesions can be removed before they ever become cancer.  This test is not only looking for cancer, but also getting rid of precancerious lesions.    If you are under/uninsured, we recommend you contact the [facility] program. [facility] is a free colorectal cancer  screening program that provides colonoscopies for eligible under/uninsured [place] men and women. If you are interested in receiving a free colonoscopy, please call [facility] at [phone] (mention code [facility]) to see if you re eligible.     If you do not wish to do a colonoscopy or cannot afford to do one, at this time, there is another option. It is called a FIT test or Fecal Immunochemical Occult Blood Test (take home stool sample kit).  It does not replace the colonoscopy for colorectal cancer screening, but it can detect hidden bleeding in the lower colon.  It does need to be repeated every year and if a positive result is obtained, you would be referred for a colonoscopy.    If you have completed either one of these tests at another facility, please call/respond to this message with the details of when and where the tests were done and if they were normal or not. Or have the records sent to our clinic so that we can best coordinate your care.,   Schedule a PAP SMEAR EXAM which is due.  Please disregard this reminder if you have had this exam elsewhere within the last year.  It would be helpful for us to have a copy of your recent pap smear report in our file so that we can best coordinate your care.    If you are under/uninsured, we recommend you contact the [facility]. They offer pap smears at no charge or on a sliding fee charge. You can schedule with them at [phone]. Please have them send us the results.    , and   Schedule a Nurse-Only appointment to update your immunizations: Your records indicate that you are not up to date with your immunizations, please schedule a nurse-only appointment to get these updated or update them at your next office visit. If this is incorrect, please disregard.    Here is a list of Health Maintenance topics that are due now or due soon:  Health Maintenance Due   Topic Date Due    ADVANCE CARE PLANNING  Never done    ZOSTER IMMUNIZATION (1 of 2) Never done     YEARLY PREVENTIVE VISIT  06/16/2015    EYE EXAM  07/16/2015    MAMMO SCREENING  08/05/2016    PAP  06/16/2019    COLORECTAL CANCER SCREENING  07/09/2021    COVID-19 Vaccine (3 - Booster for Pfizer series) 05/11/2022    INFLUENZA VACCINE (1) 09/01/2022    DTAP/TDAP/TD IMMUNIZATION (6 - Td or Tdap) 10/05/2022        To schedule an appointment or discuss this further, you may contact us by phone at the [facility] at [phone] or online through the patient [email or web address] @ [email or web address]    Thank you for trusting [facility] and we appreciate the opportunity to serve you.  We look forward to supporting your healthcare needs in the future.    Your partners in health,      Quality Committee at [facility]

## 2023-06-06 ENCOUNTER — TELEPHONE (OUTPATIENT)
Dept: FAMILY MEDICINE | Facility: CLINIC | Age: 63
End: 2023-06-06

## 2023-06-06 NOTE — TELEPHONE ENCOUNTER
.Reason for call:  Form   Our goal is to have forms completed within 72 hours, however some forms may require a visit or additional information.     Who is the form from? Patient  Where did the form come from? Patient or family brought in     What clinic location was the form placed at? [facility] location  Where was the form placed? [facility]  What number is listed as a contact on the form? [phone]    Phone call message - patient request for a letter, form or note:     Date needed: as soon as possible  Patient will  at the clinic when completed  Has the patient signed a consent form for release of information? Not Applicable    Additional comments:     Type of letter, form or note: Unemployment    Phone number to reach patient:  Cell number on file:    Telephone Information:   Mobile [phone]       Best Time:  Anytime    Can we leave a detailed message on this number?  YES    Travel screening: Not Applicable

## 2023-06-06 NOTE — TELEPHONE ENCOUNTER
[place] and placed in provider's bin to address. Pt has telephone appt scheduled for 6/7/23

## 2023-06-06 NOTE — TELEPHONE ENCOUNTER
Patient needs a visit to discuss the details of the forms. The first two pages of the form, is patient's responsibility to fill out    Sara Barrera PA-C

## 2023-06-07 ENCOUNTER — VIRTUAL VISIT (OUTPATIENT)
Dept: FAMILY MEDICINE | Facility: CLINIC | Age: 63
End: 2023-06-07

## 2023-06-07 DIAGNOSIS — Z87.01 HISTORY OF PNEUMONIA: Primary | ICD-10-CM

## 2023-06-07 PROCEDURE — 99213 OFFICE O/P EST LOW 20 MIN: CPT | Mod: 95 | Performed by: PHYSICIAN ASSISTANT

## 2023-06-07 NOTE — PATIENT INSTRUCTIONS
At [facility], we strive to deliver an exceptional experience to you, every time we see you. If you receive a survey, please complete it as we do value your feedback.  If you have MyChart, you can expect to receive results automatically within 24 hours of their completion.  Your provider will send a note interpreting your results as well.   If you do not have MyChart, you should receive your results in about a week by mail.    Your care team:                            Family Medicine Internal Medicine   MD Ross Maxwell MD Shantel Branch-Fleming, MD Srinivasa Vaka, MD Katya Belousova, PASHANE Collins CNP, MD ([facility]) [facility]   Harsh Rosales, MD Nirali Agrawal MD Amelia Massimini APRN SAUL Caldwell APRN MD Quynh Ontiveros MD          Clinic hours: Monday - Thursday 7 am-6 pm; Fridays 7 am-5 pm.   Urgent care: Monday - Friday 10 am- 8 pm; Saturday and Sunday 9 am-5 pm.    Clinic: [phone]       [facility]: Monday - Thursday 8 am - 7 pm; Friday 8 am - 6 pm  [facility]: [phone]

## 2023-06-07 NOTE — PROGRESS NOTES
Nancy is a 63 year old who is being evaluated via a billable telephone visit.      What phone number would you like to be contacted at? [phone]  How would you like to obtain your AVS? Mail a copy  [place] (provider location):  Off-site    Assessment & Plan   Problem List Items Addressed This Visit    None  Visit Diagnoses     History of pneumonia    -  Primary         Unemployment paperwork will be filled out and patient will be called to come to sing it before faxing     20 minutes spent by me on the date of the encounter doing chart review, history and exam, documentation and further activities per the note           Sara Barrera PA-C  [facility]    Gilberto Cruz is a 63 year old, presenting for the following health issues:  Cough    HPI     Patient needs unemployment form to be filled out since she was terminated from her job.   Went to ED on 3/12/23 and 3/17/23 was sick and diagnosed with pneumonia.  called in to work for pneumonia till April 18 th. Patient was not seen from 3/17-4/17 and did not file for FMLA.  Patient was seen in clinic on 4/17/23, chest xray was normal and she was recovered form pneumonia. Patient also had low back pain, so she was given a note to return to work with limitations. When she showed up at work, she was fired. Now patient filed for unemployment benefits and needs a form to be filled out.       Hypertension Follow-up      Do you check your blood pressure regularly outside of the clinic? Yes     Are you following a low salt diet? No    Are your blood pressures ever more than 140 on the top number (systolic) OR more   than 90 on the bottom number (diastolic), for example 140/90? No        Review of Systems   Constitutional, HEENT, cardiovascular, pulmonary, gi and gu systems are negative, except as otherwise noted.      Objective    Vitals - Patient Reported  Systolic (Patient Reported): 130  Diastolic (Patient Reported):  87    Vitals:  No vitals were obtained today due to virtual visit.    Physical Exam   healthy, alert and no distress  PSYCH: Alert and oriented times 3; coherent speech, normal   rate and volume, able to articulate logical thoughts, able   to abstract reason, no tangential thoughts, no hallucinations   or delusions  Her affect is normal  RESP: No cough, no audible wheezing, able to talk in full sentences  Remainder of exam unable to be completed due to telephone visits                Phone call duration: 20 minutes

## 2023-06-08 NOTE — TELEPHONE ENCOUNTER
Patient's  calling wanting to know if they can come to the clinic to fill out their portion of the forms.  Huddled with Shefali and she states that she will have her portion completed after lunch and that the patient will need to bring the forms home to complete as there are several pages to be completed before the patient submits.  Spoke to the  and explained that we will call them when the forms are ready for  sometime after lunch.  understands.  Sadaf Trejo MA  [facility]   Primary Care

## 2023-06-08 NOTE — TELEPHONE ENCOUNTER
Called and spoke to patient's  and explained form . He understands. Bringing form to the  by 1:30 pm today, 6/8/2023.  Copy to TC and abstracting.  Sadaf Trejo MA  [facility]   Primary Care

## 2023-10-26 ENCOUNTER — TELEPHONE (OUTPATIENT)
Dept: FAMILY MEDICINE | Facility: CLINIC | Age: 63
End: 2023-10-26

## 2023-10-26 NOTE — TELEPHONE ENCOUNTER
Patient Quality Outreach    Patient is due for the following:   Breast Cancer Screening - Mammogram  Physical Annual Wellness Visit      Topic Date Due    Zoster (Shingles) Vaccine (1 of 2) Never done    Diptheria Tetanus Pertussis (DTAP/TDAP/TD) Vaccine (6 - Td or Tdap) 10/05/2022    Flu Vaccine (1) 09/01/2023    COVID-19 Vaccine (3 - 2023-24 season) 09/01/2023       Next Steps:   Schedule a Annual Wellness Visit    Type of outreach:    Phone, spoke to patient/parent. Will call back to schedule Mammo and Annual Wellness      Questions for provider review:    None           Miya Escamilla MA

## 2024-02-19 ENCOUNTER — ANCILLARY PROCEDURE (OUTPATIENT)
Dept: GENERAL RADIOLOGY | Facility: CLINIC | Age: 64
End: 2024-02-19
Attending: FAMILY MEDICINE

## 2024-02-19 ENCOUNTER — OFFICE VISIT (OUTPATIENT)
Dept: URGENT CARE | Facility: URGENT CARE | Age: 64
End: 2024-02-19

## 2024-02-19 VITALS
OXYGEN SATURATION: 99 % | WEIGHT: 181.1 LBS | HEART RATE: 96 BPM | DIASTOLIC BLOOD PRESSURE: 90 MMHG | TEMPERATURE: 99.4 F | BODY MASS INDEX: 32.29 KG/M2 | SYSTOLIC BLOOD PRESSURE: 173 MMHG

## 2024-02-19 DIAGNOSIS — R68.89 FLU-LIKE SYMPTOMS: ICD-10-CM

## 2024-02-19 DIAGNOSIS — H65.03 NON-RECURRENT ACUTE SEROUS OTITIS MEDIA OF BOTH EARS: ICD-10-CM

## 2024-02-19 DIAGNOSIS — J45.21 MILD INTERMITTENT REACTIVE AIRWAY DISEASE WITH ACUTE EXACERBATION: ICD-10-CM

## 2024-02-19 DIAGNOSIS — R50.9 FEVER IN ADULT: ICD-10-CM

## 2024-02-19 DIAGNOSIS — J02.9 PHARYNGITIS, UNSPECIFIED ETIOLOGY: ICD-10-CM

## 2024-02-19 DIAGNOSIS — J18.0 BRONCHOPNEUMONIA: Primary | ICD-10-CM

## 2024-02-19 LAB
DEPRECATED S PYO AG THROAT QL EIA: NEGATIVE
FLUAV AG SPEC QL IA: NEGATIVE
FLUBV AG SPEC QL IA: NEGATIVE
GROUP A STREP BY PCR: NOT DETECTED

## 2024-02-19 PROCEDURE — 87651 STREP A DNA AMP PROBE: CPT | Performed by: FAMILY MEDICINE

## 2024-02-19 PROCEDURE — 87635 SARS-COV-2 COVID-19 AMP PRB: CPT | Performed by: FAMILY MEDICINE

## 2024-02-19 PROCEDURE — 71046 X-RAY EXAM CHEST 2 VIEWS: CPT | Mod: TC | Performed by: RADIOLOGY

## 2024-02-19 PROCEDURE — 87804 INFLUENZA ASSAY W/OPTIC: CPT | Performed by: FAMILY MEDICINE

## 2024-02-19 PROCEDURE — 99215 OFFICE O/P EST HI 40 MIN: CPT | Performed by: FAMILY MEDICINE

## 2024-02-19 RX ORDER — DOXYCYCLINE HYCLATE 100 MG
100 TABLET ORAL 2 TIMES DAILY
Qty: 20 TABLET | Refills: 0 | Status: SHIPPED | OUTPATIENT
Start: 2024-02-19 | End: 2024-02-29

## 2024-02-19 RX ORDER — PREDNISONE 20 MG/1
40 TABLET ORAL DAILY
Qty: 10 TABLET | Refills: 0 | Status: SHIPPED | OUTPATIENT
Start: 2024-02-19 | End: 2024-02-24

## 2024-02-19 RX ORDER — FLUTICASONE PROPIONATE 50 MCG
1 SPRAY, SUSPENSION (ML) NASAL 2 TIMES DAILY
Qty: 16 G | Refills: 0 | Status: SHIPPED | OUTPATIENT
Start: 2024-02-19

## 2024-02-19 RX ORDER — BENZONATATE 200 MG/1
200 CAPSULE ORAL 3 TIMES DAILY PRN
Qty: 30 CAPSULE | Refills: 0 | Status: SHIPPED | OUTPATIENT
Start: 2024-02-19

## 2024-02-19 RX ORDER — ALBUTEROL SULFATE 90 UG/1
2 AEROSOL, METERED RESPIRATORY (INHALATION) EVERY 6 HOURS
Qty: 18 G | Refills: 0 | Status: SHIPPED | OUTPATIENT
Start: 2024-02-19 | End: 2024-07-01

## 2024-02-19 ASSESSMENT — PAIN SCALES - GENERAL: PAINLEVEL: SEVERE PAIN (6)

## 2024-02-19 NOTE — PROGRESS NOTES
ASSESSMENT/PLAN:      ICD-10-CM    1. Bronchopneumonia  J18.0 XR Chest 2 Views     doxycycline hyclate (VIBRA-TABS) 100 MG tablet     benzonatate (TESSALON) 200 MG capsule      2. Mild intermittent reactive airway disease with acute exacerbation  J45.21 XR Chest 2 Views     albuterol (PROAIR HFA/PROVENTIL HFA/VENTOLIN HFA) 108 (90 Base) MCG/ACT inhaler     predniSONE (DELTASONE) 20 MG tablet      3. Non-recurrent acute serous otitis media of both ears  H65.03 fluticasone (FLONASE) 50 MCG/ACT nasal spray      4. Flu-like symptoms  R68.89 Influenza A & B Antigen - Clinic Collect     Symptomatic COVID-19 Virus (Coronavirus) by PCR Nose     XR Chest 2 Views      5. Pharyngitis, unspecified etiology  J02.9 Streptococcus A Rapid Screen w/Reflex to PCR - Clinic Collect     Group A Streptococcus PCR Throat Swab          Patient Instructions   Start doxycycline-an antibiotic  2 times a day for 10 days always take with food to prevent nausea vomiting for your bronchitis      Start prednisone 2 tablets daily for 5 days-take one now and then second dose in am every day for 5 days, do not take at night -will make it difficult to sleep    Start an albuterol inhaler-   2 puffs in a.m. and bedtime and every 4-6 hours as needed for wheezing  if you are having a coughing spell you can take 2 puffs as needed to help decrease the cough        Start Flonase1 spray in each nostril in am and bedtime for nasal congestion and drainage      Start Claritin 10 mg a day for 30 days      Take tessalon perles ( benzonatate) pills for cough up to 3 times a day will not make you sleepy       Take benadryl ( diphenhydramine) at bedtime will help with congestion and cough will make you sleepy          Reviewed medication instructions and side effects. Follow up if experiences side effects.     I reviewed supportive care, otc meds to use if needed, expected course, and signs of concern.  Follow up as needed or if she does not improve within  1-2  days or if worsens in any way.  Reviewed red flag symptoms and is to go to the ER if experiences any of these.     The use of Dragon/Itaconix dictation services may have been used to construct the content in this note; any grammatical or spelling errors are non-intentional. Please contact the author of this note directly if you are in need of any clarification.      On the day of the encounter, time spend on chart review, patient visit, review of testing, documentation was 40 minutes              Patient presents with:  URI: Patient seen in clinic with a cold. She has had a fever, coughing and thick brown mucous. She states this all started on the 13th.  Knee Pain: Patient also states she has been having right knee pain. Knee pain goes and come.       Subjective     Nancy Bejarano is a 64 year old female who presents to clinic today for the following health issues:    HPI     Patient is a 64-year-old female with onset of fever, cough, sore throat and right ear pain on 2/13/2024  No history of asthma ,no wheezing  No nausea vomiting or diarrhea  Complain of fatigue and diffuse myalgias  Exposure to spouse and grandchildren with similar symptoms    Patient with right knee pain comes and goes for several years   at present having significant myalgias with current flu-like symptoms and both knees are hurting  She will follow-up with her primary care provider for right knee pain once current acute illness resolves    Vaccinated for COVID has not received boosters  not vaccinated for flu          Past Medical History:   Diagnosis Date    Cataracts, both eyes 8/24/2012    Hyperlipidemia LDL goal <130 12/27/2011    Hypertension     TB (tuberculosis)      Social History     Tobacco Use    Smoking status: Never    Smokeless tobacco: Never    Tobacco comments:     Lives in smoke free household.   Substance Use Topics    Alcohol use: No       Current Outpatient Medications   Medication Sig Dispense Refill    albuterol (PROAIR  HFA/PROVENTIL HFA/VENTOLIN HFA) 108 (90 Base) MCG/ACT inhaler Inhale 2 puffs into the lungs every 6 hours 18 g 0    benzonatate (TESSALON) 200 MG capsule Take 1 capsule (200 mg) by mouth 3 times daily as needed for cough 30 capsule 0    doxycycline hyclate (VIBRA-TABS) 100 MG tablet Take 1 tablet (100 mg) by mouth 2 times daily for 10 days 20 tablet 0    fluticasone (FLONASE) 50 MCG/ACT nasal spray Spray 1 spray into both nostrils 2 times daily 16 g 0    predniSONE (DELTASONE) 20 MG tablet Take 2 tablets (40 mg) by mouth daily for 5 days 10 tablet 0    acetaminophen (TYLENOL) 500 MG tablet Take 1-2 tablets (500-1,000 mg) by mouth every 6 hours as needed for mild pain 30 tablet 3    amLODIPine (NORVASC) 10 MG tablet Take 1 tablet (10 mg) by mouth daily 90 tablet 3    atenolol (TENORMIN) 50 MG tablet Take 1 tablet (50 mg) by mouth daily 90 tablet 3    ibuprofen (ADVIL/MOTRIN) 600 MG tablet Take 1 tablet (600 mg) by mouth every 6 hours as needed for moderate pain 30 tablet 0    methocarbamol (ROBAXIN) 500 MG tablet Take 1 tablet (500 mg) by mouth 4 times daily as needed for muscle spasms 30 tablet 1    simethicone (MYLICON) 125 MG chewable tablet Take 1 tablet (125 mg) by mouth 4 times daily as needed for intestinal gas 60 tablet 1    simvastatin (ZOCOR) 20 MG tablet Take 1 tablet (20 mg) by mouth At Bedtime 90 tablet 3     Allergies   Allergen Reactions    Asa [Dihydroxyaluminum Aminoacetate] Other (See Comments)     Epigastric   pain             ROS are negative, except as otherwise noted HPI      Objective    BP (!) 173/90 (BP Location: Left arm, Patient Position: Sitting, Cuff Size: Adult Regular)   Pulse 96   Temp 99.4  F (37.4  C) (Tympanic)   Wt 82.1 kg (181 lb 1.6 oz)   SpO2 99%   BMI 32.29 kg/m    Body mass index is 32.29 kg/m .  Physical Exam     GENERAL:  Alert, fatigued, minimal distress.  HEENT: Normocephalic, atraumatic. PEERRLA, EOMI.  Scleras, lids and conjunctivae normal. Pinnas, canals clear and  TM's dull air-fluid levels bilateral,  nose congestion and oropharynx -posterior pharynx moderately injected  NECK: supple and a few shotty anterior chain bilateral adenopathy   CHEST: Diffuse wheezing, bibasilar crackles, no rales.   HEART:  S1 and S2 normal, no murmurs, clicks, gallops or rubs. Regular rate and rhythm.    NEURO:Alert and oriented x3, normal strength and tone, normal gait      Diagnostic Test Results:  Labs reviewed in Epic  Results for orders placed or performed in visit on 02/19/24   XR Chest 2 Views     Status: None    Narrative    CHEST TWO VIEWS  2/19/2024 1:11 PM     HISTORY: Fever, bibasilar crackles. Flu-like symptoms. Fever in adult.    COMPARISON: April 17, 2023       Impression    IMPRESSION:  There are no acute infiltrates. The cardiac silhouette is  not enlarged. Pulmonary vasculature is unremarkable.     BREN ROGERS MD         SYSTEM ID:  [id number]   Results for orders placed or performed in visit on 02/19/24   Symptomatic COVID-19 Virus (Coronavirus) by PCR Nose     Status: Abnormal    Specimen: Nose; Swab   Result Value Ref Range    SARS CoV2 PCR Positive (A) Negative    Narrative    Testing was performed using the cha SARS-CoV-2 assay on the cha  6800 System. This test should be ordered for the detection of  SARS-CoV-2 in individuals who meet SARS-CoV-2 clinical and/or  epidemiological criteria. Test performance is unknown in asymptomatic  patients. This test is for in vitro diagnostic use under the FDA EUA  for laboratories certified under CLIA to perform high and/or moderate  complexity testing. This test has not been FDA cleared or approved. A  negative result does not rule out the presence of PCR inhibitors in  the specimen or target RNA in concentration below the limit of  detection for the assay. The possibility of a false negative should  be considered if the patient's recent exposure or clinical  presentation suggests COVID-19. This test was validated by the   [facility]. This  laboratory is certified under the Clinical Laboratory Improvement  Amendments of 1988 (CLIA-88) as qualified to perform high and/or  moderate complexity laboratory testing.   Streptococcus A Rapid Screen w/Reflex to PCR - Clinic Collect     Status: Normal    Specimen: Throat; Swab   Result Value Ref Range    Group A Strep antigen Negative Negative   Influenza A & B Antigen - Clinic Collect     Status: Normal    Specimen: Nose; Swab   Result Value Ref Range    Influenza A antigen Negative Negative    Influenza B antigen Negative Negative    Narrative    Test results must be correlated with clinical data. If necessary, results should be confirmed by a molecular assay or viral culture.   Group A Streptococcus PCR Throat Swab     Status: Normal    Specimen: Throat; Swab   Result Value Ref Range    Group A strep by PCR Not Detected Not Detected    Narrative    The Xpert Xpress Strep A test, performed on the [employer], is a rapid, qualitative in vitro diagnostic test for the detection of Streptococcus pyogenes (Group A ß-hemolytic Streptococcus, Strep A) in throat swab specimens from patients with signs and symptoms of pharyngitis. The Xpert Xpress Strep A test can be used as an aid in the diagnosis of Group A Streptococcal pharyngitis. The assay is not intended to monitor treatment for Group A Streptococcus infections. The Xpert Xpress Strep A test utilizes an automated real-time polymerase chain reaction (PCR) to detect Streptococcus pyogenes DNA.

## 2024-02-19 NOTE — PATIENT INSTRUCTIONS
Start doxycycline-an antibiotic  2 times a day for 10 days always take with food to prevent nausea vomiting for your bronchitis      Start prednisone 2 tablets daily for 5 days-take one now and then second dose in am every day for 5 days, do not take at night -will make it difficult to sleep    Start an albuterol inhaler-   2 puffs in a.m. and bedtime and every 4-6 hours as needed for wheezing  if you are having a coughing spell you can take 2 puffs as needed to help decrease the cough        Start Flonase1 spray in each nostril in am and bedtime for nasal congestion and drainage      Start Claritin 10 mg a day for 30 days      Take tessalon perles ( benzonatate) pills for cough up to 3 times a day will not make you sleepy       Take benadryl ( diphenhydramine) at bedtime will help with congestion and cough will make you sleepy

## 2024-02-20 ENCOUNTER — TELEPHONE (OUTPATIENT)
Dept: NURSING | Facility: CLINIC | Age: 64
End: 2024-02-20

## 2024-02-20 LAB — SARS-COV-2 RNA RESP QL NAA+PROBE: POSITIVE

## 2024-02-20 NOTE — TELEPHONE ENCOUNTER
Coronavirus (COVID-19) Notification    Caller Name (Patient, parent, daughter/son, grandparent, etc)  Patient    Reason for call  Notify of Positive Coronavirus (COVID-19) lab results, assess symptoms,  review [facility] recommendations    Lab Result    Lab test:  2019-nCoV rRt-PCR or SARS-CoV-2 PCR    Oropharyngeal AND/OR nasopharyngeal swabs is POSITIVE for 2019-nCoV RNA/SARS-COV-2 PCR (COVID-19 virus)    Gather patient reported symptoms   Assessment   Current Symptoms at time of phone call, reported by patient: (if no symptoms, document: No symptoms] Started Paxlovid yesterday and feeling better.     Date of symptom(s) onset (if applicable) 02/13/24     If at time of call, Patients symptoms have worsened, the Patient should contact 911 or have someone drive them to Emergency Dept promptly:    If Patient calling 911, inform 911 personal that you have tested positive for the Coronavirus (COVID-19).  Place mask on and await 911 to arrive.  If Emergency Dept, If possible, please have another adult drive you to the Emergency Dept but you need to wear mask when in contact with other people.      Treatment Options:   Is patient interested in discussing COVID treatment? No.        Review information with Patient    Your result was positive. This means you have COVID-19 (coronavirus).    How can I protect others?    These guidelines are for isolating before returning to work, school or .    If you DO have symptoms  Stay home and away from others   For at least 5 days after your symptoms started, AND  You are fever free for 24 hours (with no medicine that reduces fever), AND  Your other symptoms are better  Wear a mask for 10 full days anytime you are around others    If you DON'T have symptoms  Stay home and away from others for at least 5 days after your positive test  Wear a mask for 10 full days anytime you are around others    There may be different guidelines for healthcare facilities.  Please check  with the specific sites before arriving.    If you have been told by a doctor that you were severely ill with COVID-19 or are immunocompromised, you should isolate for at least 10 days.    You should not go back to work until you meet the guidelines above for ending your home isolation. You don't need to be retested for COVID-19 before going back to work--studies show that you won't spread the virus if it's been at least 10 days since your symptoms started (or 20 days, if you have a weak immune system).    Employers, schools, and daycares: This is an official notice for this person's medical guidelines for returning in-person.  They must meet the above guidelines before going back to work, school or  in person.    You will receive a positive COVID-19 letter via [facility] or the mail soon with additional self-care information.    Would you like me to review some of that information with you now?  No    If you were tested for an upcoming procedure, please contact your provider for next steps.    PEGGY BROWN

## 2024-02-23 PROBLEM — R50.9 FEVER IN ADULT: Status: RESOLVED | Noted: 2024-02-23 | Resolved: 2024-02-23

## 2024-02-23 PROBLEM — R50.9 FEVER IN ADULT: Status: ACTIVE | Noted: 2024-02-23

## 2024-02-26 DIAGNOSIS — G89.29 CHRONIC BILATERAL LOW BACK PAIN WITHOUT SCIATICA: ICD-10-CM

## 2024-02-26 DIAGNOSIS — M54.50 CHRONIC BILATERAL LOW BACK PAIN WITHOUT SCIATICA: ICD-10-CM

## 2024-02-26 RX ORDER — ACETAMINOPHEN 500 MG
500-1000 TABLET ORAL EVERY 6 HOURS PRN
Qty: 30 TABLET | Refills: 3 | Status: SHIPPED | OUTPATIENT
Start: 2024-02-26

## 2024-02-26 NOTE — TELEPHONE ENCOUNTER
Medication Question or Refill    Contacts         Type Contact Phone/Fax    02/26/2024 02:08 PM CST Phone (Incoming) MINO ANDERSON (Emergency Contact) [phone]            What medication are you calling about (include dose and sig)?:     acetaminophen (TYLENOL) 500 MG tablet       Preferred Pharmacy:    [id number] - [place], [place] - [address] AT [facility] [address]  [place] [facility] [address]  Phone: [phone] Fax: [phone]      Controlled Substance Agreement on file:   CSA -- Patient Level:    CSA: None found at the patient level.       Who prescribed the medication?: Yakelin Caldwell CNP    Do you need a refill? Yes    Patient offered an appointment? No    Do you have any questions or concerns?  No      Okay to leave a detailed message?: Yes at Cell number on file:    Telephone Information:   Mobile [phone]   GREG Plaza

## 2024-04-12 ENCOUNTER — TELEPHONE (OUTPATIENT)
Dept: FAMILY MEDICINE | Facility: CLINIC | Age: 64
End: 2024-04-12

## 2024-04-12 NOTE — TELEPHONE ENCOUNTER
Patient Quality Outreach    Patient is due for the following:   Asthma  -  ACT needed  Colon Cancer Screening  Breast Cancer Screening - Mammogram  Depression  -  PHQ-9 needed  Physical Preventive Adult Physical      Topic Date Due    Pneumococcal Vaccine (1 of 2 - PCV) Never done    Zoster (Shingles) Vaccine (1 of 2) Never done    Diptheria Tetanus Pertussis (DTAP/TDAP/TD) Vaccine (6 - Td or Tdap) 10/05/2022    Flu Vaccine (1) 09/01/2023    COVID-19 Vaccine (3 - 2023-24 season) 09/01/2023       Next Steps:   Schedule a Adult Preventative    Type of outreach:    Phone, left message for patient/parent to call back.      Questions for provider review:    None           Miya Escamilla MA

## 2024-07-01 ENCOUNTER — ANCILLARY PROCEDURE (OUTPATIENT)
Dept: GENERAL RADIOLOGY | Facility: CLINIC | Age: 64
End: 2024-07-01
Attending: FAMILY MEDICINE

## 2024-07-01 ENCOUNTER — OFFICE VISIT (OUTPATIENT)
Dept: FAMILY MEDICINE | Facility: CLINIC | Age: 64
End: 2024-07-01

## 2024-07-01 VITALS
HEIGHT: 63 IN | HEART RATE: 105 BPM | DIASTOLIC BLOOD PRESSURE: 81 MMHG | TEMPERATURE: 97.6 F | OXYGEN SATURATION: 98 % | SYSTOLIC BLOOD PRESSURE: 151 MMHG | BODY MASS INDEX: 31.54 KG/M2 | RESPIRATION RATE: 18 BRPM | WEIGHT: 178 LBS

## 2024-07-01 DIAGNOSIS — J18.9 PNEUMONIA OF LEFT LOWER LOBE DUE TO INFECTIOUS ORGANISM: Primary | ICD-10-CM

## 2024-07-01 DIAGNOSIS — J45.21 MILD INTERMITTENT REACTIVE AIRWAY DISEASE WITH ACUTE EXACERBATION: ICD-10-CM

## 2024-07-01 DIAGNOSIS — J20.9 ACUTE BRONCHITIS, UNSPECIFIED ORGANISM: ICD-10-CM

## 2024-07-01 PROCEDURE — 99214 OFFICE O/P EST MOD 30 MIN: CPT | Performed by: FAMILY MEDICINE

## 2024-07-01 PROCEDURE — 71046 X-RAY EXAM CHEST 2 VIEWS: CPT | Mod: TC | Performed by: RADIOLOGY

## 2024-07-01 RX ORDER — ALBUTEROL SULFATE 90 UG/1
2 AEROSOL, METERED RESPIRATORY (INHALATION) EVERY 4 HOURS PRN
Qty: 18 G | Refills: 0 | Status: SHIPPED | OUTPATIENT
Start: 2024-07-01

## 2024-07-01 RX ORDER — PREDNISONE 20 MG/1
20 TABLET ORAL 2 TIMES DAILY WITH MEALS
Qty: 12 TABLET | Refills: 0 | Status: SHIPPED | OUTPATIENT
Start: 2024-07-01 | End: 2024-07-07

## 2024-07-01 RX ORDER — DOXYCYCLINE HYCLATE 100 MG
100 TABLET ORAL 2 TIMES DAILY
Qty: 20 TABLET | Refills: 0 | Status: SHIPPED | OUTPATIENT
Start: 2024-07-01 | End: 2024-07-05 | Stop reason: SINTOL

## 2024-07-01 ASSESSMENT — ASTHMA QUESTIONNAIRES
QUESTION_5 LAST FOUR WEEKS HOW WOULD YOU RATE YOUR ASTHMA CONTROL: COMPLETELY CONTROLLED
ACT_TOTALSCORE: 25
QUESTION_3 LAST FOUR WEEKS HOW OFTEN DID YOUR ASTHMA SYMPTOMS (WHEEZING, COUGHING, SHORTNESS OF BREATH, CHEST TIGHTNESS OR PAIN) WAKE YOU UP AT NIGHT OR EARLIER THAN USUAL IN THE MORNING: NOT AT ALL
QUESTION_2 LAST FOUR WEEKS HOW OFTEN HAVE YOU HAD SHORTNESS OF BREATH: NOT AT ALL
ACT_TOTALSCORE: 25
QUESTION_4 LAST FOUR WEEKS HOW OFTEN HAVE YOU USED YOUR RESCUE INHALER OR NEBULIZER MEDICATION (SUCH AS ALBUTEROL): NOT AT ALL
QUESTION_1 LAST FOUR WEEKS HOW MUCH OF THE TIME DID YOUR ASTHMA KEEP YOU FROM GETTING AS MUCH DONE AT WORK, SCHOOL OR AT HOME: NONE OF THE TIME

## 2024-07-01 ASSESSMENT — ENCOUNTER SYMPTOMS
FEVER: 1
COUGH: 1

## 2024-07-01 ASSESSMENT — PAIN SCALES - GENERAL: PAINLEVEL: WORST PAIN (10)

## 2024-07-01 NOTE — PROGRESS NOTES
"  Assessment & Plan     (J18.9) Pneumonia of left lower lobe due to infectious organism  (primary encounter diagnosis)  Comment:   Plan: doxycycline hyclate (VIBRA-TABS) 100 MG tablet        Return in about 2 weeks (around 7/15/2024) for full physical and recheck chest x-ray.      (J45.21) Mild intermittent reactive airway disease with acute exacerbation  Comment: The patient is not sure how she got this diagnosis. I do not know the details either, but for me the treatment is the same whether this is asthma or acute bronchitis.   Plan: predniSONE (DELTASONE) 20 MG tablet, albuterol         (PROAIR HFA/PROVENTIL HFA/VENTOLIN HFA) 108 (90        Base) MCG/ACT inhaler        She can discuss with her PCP about testing for asthma when she is symptom free.           Gilberto Cruz is a 64 year old, presenting for the following health issues:  Cough and Fever        7/1/2024    10:24 AM   Additional Questions   Roomed by hermila looney   Accompanied by none         7/1/2024    10:24 AM   Patient Reported Additional Medications   Patient reports taking the following new medications none     History of Present Illness       Reason for visit:  Cought, fever  Symptom onset:  1-2 weeks ago  Symptoms include:  Fever, cough, pain on the shoulders  Symptom intensity:  Severe  Symptom progression:  Worsening  Had these symptoms before:  Yes  Has tried/received treatment for these symptoms:  Yes  Previous treatment was successful:  Yes  Prior treatment description:  Covid 19  What makes it worse:  None  What makes it better:  None    She eats 2-3 servings of fruits and vegetables daily.She consumes 0 sweetened beverage(s) daily.She exercises with enough effort to increase her heart rate 30 to 60 minutes per day.  She exercises with enough effort to increase her heart rate 6 days per week.   She is taking medications regularly.     Onset of symptoms, beginning of last week around 6/23. Her first symptom was \"cold\" and itchy nose. She " "currently has chest and upper back pain with coughing. She is also feels she has been having a fever but hasn't measured it. She tried Tylenol and ointment for her chest and back.     Review of Systems   Constitutional:  Positive for fever.   Respiratory:  Positive for cough.           Objective    BP (!) 151/81   Pulse 105   Temp 97.6  F (36.4  C) (Temporal)   Resp 18   Ht 1.595 m (5' 2.8\")   Wt 80.7 kg (178 lb)   SpO2 98%   BMI 31.73 kg/m    Body mass index is 31.73 kg/m .  Physical Exam   GENERAL: healthy, alert and no distress  EYES: Eyes grossly normal to inspection, PERRL, EOMI, sclerae white and conjunctivae normal  HENT: ear canals and TM's normal, nose and mouth without ulcers or lesions   RESP: lungs clear to auscultation - no crackles or wheezes, no areas of dullness, no tachypnea  CV: Heart regular rate and rhythm without murmur, click or rub. No peripheral edema and peripheral pulses strong  MS: no gross musculoskeletal defects noted, no edema  SKIN: no suspicious lesions or rashes to visible skin  NEURO: Normal strength and tone, sensory exam grossly normal, mentation intact, oriented times 3 and cranial nerves 2-12 intact  PSYCH: mentation appears normal, affect normal/bright     A Chest X-Ray was ordered. My reading of this film is probable infiltrate in left lower lobe. Lungs appear under-inflated compared to previous. (Comparison films available: pending review by Radiologist.)           Signed Electronically by: Samir Ramsay MD      The information in this document, created by the medical scribe for me, accurately reflects the services I personally performed and the decisions made by me. I have reviewed and approved this document for accuracy prior to signature.  Tee Vera      "

## 2024-07-01 NOTE — PATIENT INSTRUCTIONS
At [facility], we strive to deliver an exceptional experience to you, every time we see you. If you receive a survey, please complete it as we do value your feedback.  If you have MyChart, you can expect to receive results automatically within 24 hours of their completion.  Your provider will send a note interpreting your results as well.   If you do not have MyChart, you should receive your results in about a week by mail.    Your care team:                            Family Medicine Internal Medicine   MD Ross Maxwell, MD Madonna Ayers, MD Shefali Shrestha, PA-C    Henry James, MD Pediatrics   Harsh Rosales, PA-C  Emerita Goldberg, MD Molly Greco APRSHANE Sibley CNP, CNP, MD Quynh House, MD Misti Jansen, CNP  Same-Day (No follow up visit)   Balbir Jones, SAUL Blackmon, PA-C    Meredith Fields PA-C     Clinic hours: Monday - Thursday 7 am-6 pm; Fridays 7 am-5 pm.   Urgent care: Monday - Friday 10 am- 8 pm; Saturday and Sunday 9 am-5 pm.    Clinic: [phone]       [facility]: Monday - Thursday 8 am - 7 pm; Friday 8 am - 6 pm  [facility]: [phone]

## 2024-07-05 ENCOUNTER — NURSE TRIAGE (OUTPATIENT)
Dept: FAMILY MEDICINE | Facility: CLINIC | Age: 64
End: 2024-07-05

## 2024-07-05 DIAGNOSIS — J18.9 PNEUMONIA OF LEFT LOWER LOBE DUE TO INFECTIOUS ORGANISM: Primary | ICD-10-CM

## 2024-07-05 DIAGNOSIS — I10 HYPERTENSION GOAL BP (BLOOD PRESSURE) < 140/90: ICD-10-CM

## 2024-07-05 RX ORDER — CEFUROXIME AXETIL 500 MG/1
500 TABLET ORAL 2 TIMES DAILY
Qty: 14 TABLET | Refills: 0 | Status: SHIPPED | OUTPATIENT
Start: 2024-07-05 | End: 2024-07-12

## 2024-07-05 NOTE — TELEPHONE ENCOUNTER
Provider Response to 2nd Level Triage Request    I have reviewed the RN documentation. My recommendation is:  Prescription for cefuroxime sent to her pharmacy.  Madonna Bee MD

## 2024-07-05 NOTE — TELEPHONE ENCOUNTER
Nurse Triage SBAR    Is this a 2nd Level Triage? NO    Situation: Pt requesting new abx, d/t concern for side effects while taking doxycycline.     Background: Pt was seen in clinic on 6/1 and prescribed doxycycline hyclate (VIBRA-TABS) 100 MG tablet, 1 tablet BID x 10 days for pneumonia.     Assessment: Pt took abx x 2 days and developed rash, itching, and reports mild dizziness upon standing. Pt stopped taking doxycycline 2 days ago and all symptoms stated above have resolved. Pt reports that she is still coughing and would like replacement abx. Pt denies any facial/tongue swelling, SOB, or difficulty breathing.     Protocol Recommended Disposition:   See in Office Within 3 Days    Recommendation: Pt is requesting new antibiotic be sent to pharmacy.      Routed to provider and covering provider pool.     Does the patient meet one of the following criteria for ADS visit consideration? No      Reason for Disposition   Prescription request for new medicine (not a refill)    Additional Information   Negative: Drug overdose and triager unable to answer question   Negative: Caller requesting a renewal or refill of a medicine patient is currently taking   Negative: Caller requesting information unrelated to medicine   Negative: Caller requesting information about COVID-19 Vaccine   Negative: Caller requesting information about Emergency Contraception   Negative: Caller requesting information about Combined Birth Control Pills   Negative: Caller requesting information about Progestin Birth Control Pills   Negative: Caller requesting information about Post-Op pain or medicines   Negative: Caller requesting a prescription antibiotic (such as penicillin) for Strep throat and has a positive culture result   Negative: Caller requesting a prescription anti-viral med (such as Tamiflu) and has influenza (flu) symptoms   Negative: Immunization reaction suspected   Negative: Rash while taking a medicine or within 3 days of stopping  "it   Negative: Asthma and having symptoms of asthma (cough, wheezing, etc.)   Negative: Symptom of illness (e.g., headache, abdominal pain, earache, vomiting) that are more than mild   Negative: Breastfeeding questions about mother's medicines and diet   Negative: Intentional drug overdose and suicidal thoughts or ideas   Negative: MORE THAN A DOUBLE DOSE of a prescription or over-the-counter (OTC) drug   Negative: DOUBLE DOSE (an extra dose or lesser amount) of prescription drug and any symptoms (e.g., dizziness, nausea, pain, sleepiness)   Negative: DOUBLE DOSE (an extra dose or lesser amount) of over-the-counter (OTC) drug and any symptoms (e.g., dizziness, nausea, pain, sleepiness)   Negative: Took another person's prescription drug   Negative: DOUBLE DOSE (an extra dose or lesser amount) of prescription drug and NO symptoms  (Exception: A double dose of antibiotics.)   Negative: Diabetes drug error or overdose (e.g., took wrong type of insulin or took extra dose)   Negative: Caller has medication question about med NOT prescribed by PCP and triager unable to answer question (e.g., compatibility with other med, storage)   Negative: Prescription not at pharmacy and was prescribed by PCP recently  (Exception: triager has access to EMR and prescription is recorded there. Go to Home Care and confirm for pharmacy.)   Negative: Pharmacy calling with prescription question and triager unable to answer question   Negative: Caller has URGENT medicine question about med that PCP or specialist prescribed and triager unable to answer question   Negative: Caller has NON-URGENT medicine question about med that PCP or specialist prescribed and triager unable to answer question   Negative: Caller wants to use a complementary or alternative medicine   Negative: Medicine patch causing local rash or itching    Answer Assessment - Initial Assessment Questions  1. NAME of MEDICINE: \"What medicine(s) are you calling about?\"      " "doxycycline hyclate (VIBRA-TABS) 100 MG tablet  2. QUESTION: \"What is your question?\" (e.g., double dose of medicine, side effect)      Side effects.   3. PRESCRIBER: \"Who prescribed the medicine?\" Reason: if prescribed by specialist, call should be referred to that group.      Dr. Ramsay  4. SYMPTOMS: \"Do you have any symptoms?\" If Yes, ask: \"What symptoms are you having?\"  \"How bad are the symptoms (e.g., mild, moderate, severe)      Rash, itching, dizziness upon standing. Stopped taking 2 days ago, symptoms have resolved.  5. PREGNANCY:  \"Is there any chance that you are pregnant?\" \"When was your last menstrual period?\"      no    Protocols used: Medication Question Call-A-OH    "

## 2024-07-11 RX ORDER — ATENOLOL 50 MG/1
50 TABLET ORAL DAILY
Qty: 90 TABLET | Refills: 0 | Status: SHIPPED | OUTPATIENT
Start: 2024-07-11

## 2024-07-11 RX ORDER — AMLODIPINE BESYLATE 10 MG/1
10 TABLET ORAL DAILY
Qty: 90 TABLET | Refills: 0 | Status: SHIPPED | OUTPATIENT
Start: 2024-07-11

## 2024-08-06 ENCOUNTER — TELEPHONE (OUTPATIENT)
Dept: FAMILY MEDICINE | Facility: CLINIC | Age: 64
End: 2024-08-06

## 2024-08-06 NOTE — LETTER
August 6, 2024    Nancy Bejarano  [address] [address]    Dear Nancy Bejarano,     At [facility] we care about your health and are committed to providing quality patient care.    Which includes staying current on preventive cancer screenings.  You can increase your chances of finding and treating cancers through regular screenings.      Our records indicate you may be due for the following preventive screening(s):  Diabetes -  Eye Exam  Colon Cancer Screening  Breast Cancer Screening - Mammogram  Cervical Cancer Screening - PAP Needed  Physical Preventive Adult Physical      Topic Date Due    Pneumococcal Vaccine (1 of 2 - PCV) Never done    Zoster (Shingles) Vaccine (1 of 2) Never done    Diptheria Tetanus Pertussis (DTAP/TDAP/TD) Vaccine (6 - Td or Tdap) 10/05/2022    COVID-19 Vaccine (3 - 2023-24 season) 09/01/2023       To schedule an appointment or discuss this screening further, you may contact us by phone at the [facility] at [phone] or online through the patient [email or web address] @ [email or web address]    If you have had any of the screenings listed above at another facility, please call us so that we may update your chart.      Your partners in health,      Quality Committee at [facility]                                     
Patient/Caregiver provided printed discharge information.

## 2024-08-06 NOTE — TELEPHONE ENCOUNTER
Patient Quality Outreach    Patient is due for the following:   Diabetes -  Eye Exam  Colon Cancer Screening  Breast Cancer Screening - Mammogram  Cervical Cancer Screening - PAP Needed  Physical Preventive Adult Physical      Topic Date Due    Pneumococcal Vaccine (1 of 2 - PCV) Never done    Zoster (Shingles) Vaccine (1 of 2) Never done    Diptheria Tetanus Pertussis (DTAP/TDAP/TD) Vaccine (6 - Td or Tdap) 10/05/2022    COVID-19 Vaccine (3 - 2023-24 season) 09/01/2023       Next Steps:   Schedule a Adult Preventative    Type of outreach:    Sent letter.      Questions for provider review:    None           Ailyn Don MA

## 2025-01-15 ENCOUNTER — OFFICE VISIT (OUTPATIENT)
Dept: URGENT CARE | Facility: URGENT CARE | Age: 65
End: 2025-01-15
Payer: COMMERCIAL

## 2025-01-15 ENCOUNTER — ANCILLARY PROCEDURE (OUTPATIENT)
Dept: GENERAL RADIOLOGY | Facility: CLINIC | Age: 65
End: 2025-01-15
Attending: EMERGENCY MEDICINE
Payer: COMMERCIAL

## 2025-01-15 VITALS
OXYGEN SATURATION: 98 % | DIASTOLIC BLOOD PRESSURE: 86 MMHG | BODY MASS INDEX: 30.31 KG/M2 | HEART RATE: 90 BPM | SYSTOLIC BLOOD PRESSURE: 174 MMHG | TEMPERATURE: 98.4 F | WEIGHT: 170 LBS | RESPIRATION RATE: 16 BRPM

## 2025-01-15 DIAGNOSIS — J06.9 UPPER RESPIRATORY TRACT INFECTION, UNSPECIFIED TYPE: ICD-10-CM

## 2025-01-15 DIAGNOSIS — I10 HYPERTENSION GOAL BP (BLOOD PRESSURE) < 140/90: ICD-10-CM

## 2025-01-15 DIAGNOSIS — J06.9 UPPER RESPIRATORY TRACT INFECTION, UNSPECIFIED TYPE: Primary | ICD-10-CM

## 2025-01-15 PROCEDURE — 71046 X-RAY EXAM CHEST 2 VIEWS: CPT | Mod: TC | Performed by: RADIOLOGY

## 2025-01-15 PROCEDURE — 99214 OFFICE O/P EST MOD 30 MIN: CPT | Performed by: EMERGENCY MEDICINE

## 2025-01-15 RX ORDER — AMLODIPINE BESYLATE 10 MG/1
10 TABLET ORAL DAILY
Qty: 60 TABLET | Refills: 0 | Status: SHIPPED | OUTPATIENT
Start: 2025-01-15

## 2025-01-15 RX ORDER — BENZONATATE 100 MG/1
100 CAPSULE ORAL 3 TIMES DAILY PRN
Qty: 21 CAPSULE | Refills: 0 | Status: SHIPPED | OUTPATIENT
Start: 2025-01-15

## 2025-01-15 ASSESSMENT — PAIN SCALES - GENERAL: PAINLEVEL_OUTOF10: NO PAIN (0)

## 2025-01-15 NOTE — PATIENT INSTRUCTIONS
Get back into your primary care doctor as soon as you can for blood pressure recheck. Make sure your BP is not going too low at home. Do not take if it is below 110/70.

## 2025-01-15 NOTE — PROGRESS NOTES
Assessment & Plan     Diagnosis:    ICD-10-CM    1. Upper respiratory tract infection, unspecified type  J06.9 XR Chest 2 Views     benzonatate (TESSALON) 100 MG capsule      2. Hypertension goal BP (blood pressure) < 140/90  I10 amLODIPine (NORVASC) 10 MG tablet          Medical Decision Making:  Nancy Bejarano is a 64 year old female who presents for evaluation of cough x1 week and refill for blood pressure medications.  This is consistent with an upper respiratory tract infection.  There is no signs at this point of serious bacterial infection such as OM, RPA, epiglottitis, PTA, strep pharyngitis.    Given worsening cough, I did a CXR to eval for pneumonia. This shows no acute infiltrate or effusion. There are no signs of complications of URI at this point such as hypoxia, respiratory failure or compromise.     Patient does have very elevated blood pressure here, has no complaints of chest pain or shortness of breath.  Does endorse signs or symptoms concerning for hypertensive urgency/emergency.  She ran out of her blood pressure medications due to lapse of insurance and has been out of them for at least a couple months.  She states that her blood pressure at home is always much lower, this morning it was 114/70 she states she has an upper arm cuff.  I do not want to drop her blood pressure too low but do believe that she needs at least one of her blood pressure medications to start before she follows back up with primary care.  I will restart her amlodipine 10 mg and have her follow-up close with PCP for hypertension management.    There are no gastrointestinal symptoms at this point and no signs of dehydration.  Close followup with PCP is indicated.  Go to ED for fever > 102 F, protracted vomiting, worsening shortness of breath, chest pain, severe headaches or other concerns.  Patient verbalized understanding and agreement with the plan was discharged in stable condition.      EDITA Meek  [facility]    Subjective     Nancy Bejarano is a 64 year old female who presents to clinic today for the following health issues:  Chief Complaint   Patient presents with    Cough     Coughing, and body aches for about a week.       HPI  Patient has a cough for the last week, waxing waning in severity.  Has been a little bit worse over the last couple of days and she wanted to have this checked out.  She is also here for medication refill, states that she been out of her blood pressure medications for at least a couple months due to insurance issues.  States that her blood pressure at home is always better, typically in the 110-130 systolic range.    Review of Systems    See [facility]    Objective      Vitals: BP (!) 174/86 (BP Location: Left arm, Patient Position: Sitting, Cuff Size: Adult Regular)   Pulse 90   Temp 98.4  F (36.9  C) (Oral)   Resp 16   Wt 77.1 kg (170 lb)   SpO2 98%   BMI 30.31 kg/m        Patient Vitals for the past 24 hrs:   BP Temp Temp src Pulse Resp SpO2 Weight   01/15/25 1700 (!) 174/86 -- -- -- -- -- --   01/15/25 1552 (!) 190/95 98.4  F (36.9  C) Oral 90 16 98 % 77.1 kg (170 lb)       Vital signs reviewed by: Bhavik Mcgrath PA-C    Physical Exam   Constitutional: Patient is alert and cooperative. No acute distress.  Ears: Right TM is normal. Left TM is normal. External ear canals are normal.  Mouth: Mucous membranes are moist. Normal tongue and tonsil. Posterior oropharynx is clear.  Eyes: Conjunctivae and lids are normal. PERRL.  Cardiovascular: Regular rate and rhythm  Pulmonary/Chest: Lungs are clear to auscultation throughout. Effort normal. No respiratory distress. No wheezes, rales or rhonchi.  GI: Abdomen is soft and non-tender throughout.   Neurological: Alert and oriented x3. CN 3-7 and 9-12 intact. Gait is stable. Speech is fluent and face is symmetric.  Skin: No rash noted on visualized skin.  Psychiatric:The patient has a normal mood and affect.      Labs/Imaging:  Results for orders placed or performed in visit on 01/15/25   XR Chest 2 Views     Status: None    Narrative    EXAM: XR CHEST 2 VIEWS  LOCATION: [facility]  DATE: 1/15/2025    INDICATION:  Upper respiratory tract infection, unspecified type  COMPARISON: 7/1/2024      Impression    IMPRESSION: Negative chest.     Reading per radiology    Bhavik Mcgrath PA-C, January 15, 2025

## 2025-06-04 NOTE — TELEPHONE ENCOUNTER
Needs appt. Phone or E visit is ok.  Yakelin LYNN, CNP    
No appointment made, sent letter.  Sadaf Trejo MA  [facility]  2nd Floor  Primary Care    
Requested Prescriptions   Pending Prescriptions Disp Refills     benzonatate (TESSALON) 200 MG capsule        Last Written Prescription Date:  12/04/19  Last Fill Quantity: 21,   # refills: 0  Last Office Visit: 01/29/2020-Yusuf  Future Office visit:       Routing refill request to provider for review/approval because:  Drug not on the [facility], [facility] or [facility] refill protocol or controlled substance 21 capsule 0     Sig: Take 1 capsule (200 mg) by mouth 3 times daily as needed for cough       There is no refill protocol information for this order          
Routing refill request to provider for review/approval because:  Drug not on the [facility] refill protocol     Joshua Holden RN, BSN, PHN          
This writer attempted to contact Patient on 04/01/20      Reason for call Left voicemail message to schedule a Telephone visit and left message.      If patient calls back:   Schedule Telephone Visit appointment within 2 weeks with PCP, postponing message.        Sadaf Trejo MA    
This writer attempted to contact pt on 04/06/20      Reason for call schedule telephone visit and left message.      If patient calls back:   Schedule Telephone Visit appointment within 1 week with primary care, document that pt called and close encounter         Ellen Bowman MA    
5